# Patient Record
Sex: FEMALE | Race: BLACK OR AFRICAN AMERICAN | NOT HISPANIC OR LATINO | URBAN - METROPOLITAN AREA
[De-identification: names, ages, dates, MRNs, and addresses within clinical notes are randomized per-mention and may not be internally consistent; named-entity substitution may affect disease eponyms.]

---

## 2018-03-01 ENCOUNTER — EMERGENCY (EMERGENCY)
Facility: HOSPITAL | Age: 78
LOS: 1 days | Discharge: HOME | End: 2018-03-01
Attending: EMERGENCY MEDICINE | Admitting: EMERGENCY MEDICINE
Payer: MEDICARE

## 2018-03-01 VITALS
TEMPERATURE: 98 F | RESPIRATION RATE: 28 BRPM | DIASTOLIC BLOOD PRESSURE: 62 MMHG | HEART RATE: 86 BPM | SYSTOLIC BLOOD PRESSURE: 127 MMHG | OXYGEN SATURATION: 100 %

## 2018-03-01 VITALS — SYSTOLIC BLOOD PRESSURE: 149 MMHG | HEART RATE: 74 BPM | DIASTOLIC BLOOD PRESSURE: 68 MMHG | OXYGEN SATURATION: 100 %

## 2018-03-01 DIAGNOSIS — Z95.1 PRESENCE OF AORTOCORONARY BYPASS GRAFT: Chronic | ICD-10-CM

## 2018-03-01 LAB
BASOPHILS # BLD AUTO: 0.02 K/UL — SIGNIFICANT CHANGE UP (ref 0–0.2)
BASOPHILS NFR BLD AUTO: 0.3 % — SIGNIFICANT CHANGE UP (ref 0–2)
BUN SERPL-MCNC: 26 MG/DL — HIGH (ref 7–23)
CALCIUM SERPL-MCNC: 10.1 MG/DL — SIGNIFICANT CHANGE UP (ref 8.4–10.5)
CHLORIDE SERPL-SCNC: 102 MMOL/L — SIGNIFICANT CHANGE UP (ref 98–107)
CO2 SERPL-SCNC: 24 MMOL/L — SIGNIFICANT CHANGE UP (ref 22–31)
CREAT SERPL-MCNC: 1.58 MG/DL — HIGH (ref 0.5–1.3)
EOSINOPHIL # BLD AUTO: 0.03 K/UL — SIGNIFICANT CHANGE UP (ref 0–0.5)
EOSINOPHIL NFR BLD AUTO: 0.4 % — SIGNIFICANT CHANGE UP (ref 0–6)
GLUCOSE SERPL-MCNC: 48 MG/DL — LOW (ref 70–99)
HCT VFR BLD CALC: 41.5 % — SIGNIFICANT CHANGE UP (ref 34.5–45)
HGB BLD-MCNC: 12.7 G/DL — SIGNIFICANT CHANGE UP (ref 11.5–15.5)
IMM GRANULOCYTES # BLD AUTO: 0.03 # — SIGNIFICANT CHANGE UP
IMM GRANULOCYTES NFR BLD AUTO: 0.4 % — SIGNIFICANT CHANGE UP (ref 0–1.5)
LYMPHOCYTES # BLD AUTO: 2.4 K/UL — SIGNIFICANT CHANGE UP (ref 1–3.3)
LYMPHOCYTES # BLD AUTO: 31.8 % — SIGNIFICANT CHANGE UP (ref 13–44)
MCHC RBC-ENTMCNC: 24.6 PG — LOW (ref 27–34)
MCHC RBC-ENTMCNC: 30.6 % — LOW (ref 32–36)
MCV RBC AUTO: 80.3 FL — SIGNIFICANT CHANGE UP (ref 80–100)
MONOCYTES # BLD AUTO: 0.75 K/UL — SIGNIFICANT CHANGE UP (ref 0–0.9)
MONOCYTES NFR BLD AUTO: 9.9 % — SIGNIFICANT CHANGE UP (ref 2–14)
NEUTROPHILS # BLD AUTO: 4.32 K/UL — SIGNIFICANT CHANGE UP (ref 1.8–7.4)
NEUTROPHILS NFR BLD AUTO: 57.2 % — SIGNIFICANT CHANGE UP (ref 43–77)
NRBC # FLD: 0 — SIGNIFICANT CHANGE UP
PLATELET # BLD AUTO: 296 K/UL — SIGNIFICANT CHANGE UP (ref 150–400)
PMV BLD: 10.6 FL — SIGNIFICANT CHANGE UP (ref 7–13)
POTASSIUM SERPL-MCNC: 4.9 MMOL/L — SIGNIFICANT CHANGE UP (ref 3.5–5.3)
POTASSIUM SERPL-SCNC: 4.9 MMOL/L — SIGNIFICANT CHANGE UP (ref 3.5–5.3)
RBC # BLD: 5.17 M/UL — SIGNIFICANT CHANGE UP (ref 3.8–5.2)
RBC # FLD: 15.9 % — HIGH (ref 10.3–14.5)
SODIUM SERPL-SCNC: 143 MMOL/L — SIGNIFICANT CHANGE UP (ref 135–145)
WBC # BLD: 7.55 K/UL — SIGNIFICANT CHANGE UP (ref 3.8–10.5)
WBC # FLD AUTO: 7.55 K/UL — SIGNIFICANT CHANGE UP (ref 3.8–10.5)

## 2018-03-01 PROCEDURE — 99283 EMERGENCY DEPT VISIT LOW MDM: CPT | Mod: GC

## 2018-03-01 RX ORDER — SODIUM CHLORIDE 9 MG/ML
2000 INJECTION INTRAMUSCULAR; INTRAVENOUS; SUBCUTANEOUS ONCE
Qty: 0 | Refills: 0 | Status: COMPLETED | OUTPATIENT
Start: 2018-03-01 | End: 2018-03-01

## 2018-03-01 RX ORDER — ACETAMINOPHEN 500 MG
650 TABLET ORAL EVERY 6 HOURS
Qty: 0 | Refills: 0 | Status: DISCONTINUED | OUTPATIENT
Start: 2018-03-01 | End: 2018-03-05

## 2018-03-01 RX ORDER — TRAMADOL HYDROCHLORIDE 50 MG/1
1 TABLET ORAL
Qty: 12 | Refills: 0 | OUTPATIENT
Start: 2018-03-01 | End: 2018-03-03

## 2018-03-01 RX ADMIN — Medication 650 MILLIGRAM(S): at 13:51

## 2018-03-01 RX ADMIN — Medication 650 MILLIGRAM(S): at 14:21

## 2018-03-01 RX ADMIN — SODIUM CHLORIDE 2000 MILLILITER(S): 9 INJECTION INTRAMUSCULAR; INTRAVENOUS; SUBCUTANEOUS at 13:51

## 2018-03-01 NOTE — ED ADULT NURSE NOTE - CHIEF COMPLAINT QUOTE
was seen an d DC from [facility] for same C/O on Monday. pt reports chronic atraumatic right leg pain with pain shooting down L leg. walks with cane,

## 2018-03-01 NOTE — PROVIDER CONTACT NOTE (OTHER) - ASSESSMENT
As asked by patient, I arranged [employer] taxi. Pt will pay $30 cash.
samira. Pt will travel back home by taxi arranged by .

## 2018-03-01 NOTE — ED PROVIDER NOTE - CARE PLAN
Principal Discharge DX:	Diabetic polyneuropathy associated with type 1 diabetes mellitus  Secondary Diagnosis:	Type 1 diabetes mellitus with nephropathy

## 2018-03-01 NOTE — ED PROVIDER NOTE - MEDICAL DECISION MAKING DETAILS
77 year old female >3 month atraumatic LEFT leg pain that radiates from lower back. Start fluids to bring sugar down, pain control 77 year old female >3 month atraumatic LEFT leg pain that radiates from lower back. Start fluids to bring sugar down, sugar monitoring, pain control 77 year old female >3 month atraumatic LEFT leg pain that radiates from lower back. Start fluids to bring sugar down, sugar monitoring, pain control  Samantha: rt leg pain for months to years, worsening, DM not well controled. Muscle cramps. No trauma. moves leg well. Has pulses. tx dm and pain 77 year old female >3 month atraumatic LEFT leg pain that radiates from lower back. Start fluids to bring sugar down, sugar monitoring, pain control, d/c home.   Samantha: rt leg pain for months to years, worsening, DM not well controled. Muscle cramps. No trauma. moves leg well. Has pulses. tx dm and pain

## 2018-03-01 NOTE — PROVIDER CONTACT NOTE (OTHER) - BACKGROUND
needs help with cleaning,  medications. Pt cooks, has [employer] ride and uses a cane for ambulation. Need for Medic Alert discussed, verbalized understanding. Information provided for private

## 2018-03-01 NOTE — ED PROVIDER NOTE - ATTENDING CONTRIBUTION TO CARE
I performed a history and physical exam of the patient and discussed their management with the resident and /or advanced care provider. I reviewed the resident and /or ACP's note and agree with the documented findings and plan of care. My medical decison making and observations are found above.  Abd soft. rt leg with from. sensation in le decrease lower

## 2018-03-01 NOTE — PROVIDER CONTACT NOTE (OTHER) - SITUATION
Met with pt at bedside for discharge planing. Pt reports living alone, having more and more difficulties with house chores. Pt has 1199 and Medicare. No skills need identified. Pt states that, she

## 2018-03-01 NOTE — ED PROVIDER NOTE - OBJECTIVE STATEMENT
Pt is a 77 year old female w/ RIGHT leg numbness for over 3 months. Pt was recently seen at [facility] for the same c/c where she was given fluids and discharged. Pt states she was not given any medication or follow-up. Pt states that the pain and numbness has been persistent and has not changed for past 3 months. Pt denies any new change in activity. Pt denies any alleviating factors.

## 2018-03-01 NOTE — ED ADULT TRIAGE NOTE - CHIEF COMPLAINT QUOTE
was seen an d DC from [facility] for same C/O on Monday. pt reports chronic atraumatic right leg pain with pain shooting down L leg. walks with cane,

## 2018-04-13 ENCOUNTER — INPATIENT (INPATIENT)
Facility: HOSPITAL | Age: 78
LOS: 2 days | Discharge: HOME | DRG: 300 | End: 2018-04-16
Attending: INTERNAL MEDICINE | Admitting: INTERNAL MEDICINE
Payer: COMMERCIAL

## 2018-04-13 VITALS
TEMPERATURE: 98 F | HEART RATE: 82 BPM | DIASTOLIC BLOOD PRESSURE: 83 MMHG | SYSTOLIC BLOOD PRESSURE: 136 MMHG | WEIGHT: 210.1 LBS | RESPIRATION RATE: 16 BRPM | OXYGEN SATURATION: 98 %

## 2018-04-13 DIAGNOSIS — E11.9 TYPE 2 DIABETES MELLITUS WITHOUT COMPLICATIONS: ICD-10-CM

## 2018-04-13 DIAGNOSIS — Z95.5 PRESENCE OF CORONARY ANGIOPLASTY IMPLANT AND GRAFT: Chronic | ICD-10-CM

## 2018-04-13 DIAGNOSIS — I25.10 ATHEROSCLEROTIC HEART DISEASE OF NATIVE CORONARY ARTERY WITHOUT ANGINA PECTORIS: ICD-10-CM

## 2018-04-13 DIAGNOSIS — E11.52 TYPE 2 DIABETES MELLITUS WITH DIABETIC PERIPHERAL ANGIOPATHY WITH GANGRENE: ICD-10-CM

## 2018-04-13 DIAGNOSIS — Z98.890 OTHER SPECIFIED POSTPROCEDURAL STATES: Chronic | ICD-10-CM

## 2018-04-13 DIAGNOSIS — L03.119 CELLULITIS OF UNSPECIFIED PART OF LIMB: ICD-10-CM

## 2018-04-13 DIAGNOSIS — I10 ESSENTIAL (PRIMARY) HYPERTENSION: ICD-10-CM

## 2018-04-13 DIAGNOSIS — Z29.9 ENCOUNTER FOR PROPHYLACTIC MEASURES, UNSPECIFIED: ICD-10-CM

## 2018-04-13 DIAGNOSIS — Z95.1 PRESENCE OF AORTOCORONARY BYPASS GRAFT: Chronic | ICD-10-CM

## 2018-04-13 LAB
ACETONE SERPL-MCNC: NEGATIVE — SIGNIFICANT CHANGE UP
ALBUMIN SERPL ELPH-MCNC: 2.8 G/DL — LOW (ref 3.5–5)
ALP SERPL-CCNC: 91 U/L — SIGNIFICANT CHANGE UP (ref 40–120)
ALT FLD-CCNC: 41 U/L DA — SIGNIFICANT CHANGE UP (ref 10–60)
ANION GAP SERPL CALC-SCNC: 6 MMOL/L — SIGNIFICANT CHANGE UP (ref 5–17)
AST SERPL-CCNC: 38 U/L — SIGNIFICANT CHANGE UP (ref 10–40)
BASE EXCESS BLDV CALC-SCNC: -2.3 MMOL/L — LOW (ref -2–2)
BASOPHILS # BLD AUTO: 0.1 K/UL — SIGNIFICANT CHANGE UP (ref 0–0.2)
BASOPHILS NFR BLD AUTO: 1.4 % — SIGNIFICANT CHANGE UP (ref 0–2)
BILIRUB SERPL-MCNC: 0.4 MG/DL — SIGNIFICANT CHANGE UP (ref 0.2–1.2)
BLOOD GAS COMMENTS, VENOUS: SIGNIFICANT CHANGE UP
BUN SERPL-MCNC: 28 MG/DL — HIGH (ref 7–18)
CALCIUM SERPL-MCNC: 8.9 MG/DL — SIGNIFICANT CHANGE UP (ref 8.4–10.5)
CHLORIDE SERPL-SCNC: 107 MMOL/L — SIGNIFICANT CHANGE UP (ref 96–108)
CO2 SERPL-SCNC: 25 MMOL/L — SIGNIFICANT CHANGE UP (ref 22–31)
CREAT SERPL-MCNC: 1.27 MG/DL — SIGNIFICANT CHANGE UP (ref 0.5–1.3)
EOSINOPHIL # BLD AUTO: 0.2 K/UL — SIGNIFICANT CHANGE UP (ref 0–0.5)
EOSINOPHIL NFR BLD AUTO: 3.8 % — SIGNIFICANT CHANGE UP (ref 0–6)
GLUCOSE SERPL-MCNC: 88 MG/DL — SIGNIFICANT CHANGE UP (ref 70–99)
HCO3 BLDV-SCNC: 22 MMOL/L — SIGNIFICANT CHANGE UP (ref 21–29)
HCT VFR BLD CALC: 34.1 % — LOW (ref 34.5–45)
HGB BLD-MCNC: 10.4 G/DL — LOW (ref 11.5–15.5)
HOROWITZ INDEX BLDV+IHG-RTO: 21 — SIGNIFICANT CHANGE UP
LACTATE SERPL-SCNC: 0.7 MMOL/L — SIGNIFICANT CHANGE UP (ref 0.7–2)
LYMPHOCYTES # BLD AUTO: 2 K/UL — SIGNIFICANT CHANGE UP (ref 1–3.3)
LYMPHOCYTES # BLD AUTO: 34.1 % — SIGNIFICANT CHANGE UP (ref 13–44)
MCHC RBC-ENTMCNC: 24.7 PG — LOW (ref 27–34)
MCHC RBC-ENTMCNC: 30.4 GM/DL — LOW (ref 32–36)
MCV RBC AUTO: 81.1 FL — SIGNIFICANT CHANGE UP (ref 80–100)
MONOCYTES # BLD AUTO: 0.4 K/UL — SIGNIFICANT CHANGE UP (ref 0–0.9)
MONOCYTES NFR BLD AUTO: 6.7 % — SIGNIFICANT CHANGE UP (ref 2–14)
NEUTROPHILS # BLD AUTO: 3.3 K/UL — SIGNIFICANT CHANGE UP (ref 1.8–7.4)
NEUTROPHILS NFR BLD AUTO: 54.1 % — SIGNIFICANT CHANGE UP (ref 43–77)
PCO2 BLDV: 40 MMHG — SIGNIFICANT CHANGE UP (ref 35–50)
PH BLDV: 7.37 — SIGNIFICANT CHANGE UP (ref 7.35–7.45)
PLATELET # BLD AUTO: 251 K/UL — SIGNIFICANT CHANGE UP (ref 150–400)
PO2 BLDV: 46 MMHG — HIGH (ref 25–45)
POTASSIUM SERPL-MCNC: 4.8 MMOL/L — SIGNIFICANT CHANGE UP (ref 3.5–5.3)
POTASSIUM SERPL-SCNC: 4.8 MMOL/L — SIGNIFICANT CHANGE UP (ref 3.5–5.3)
PROT SERPL-MCNC: 7.5 G/DL — SIGNIFICANT CHANGE UP (ref 6–8.3)
RBC # BLD: 4.2 M/UL — SIGNIFICANT CHANGE UP (ref 3.8–5.2)
RBC # FLD: 15.5 % — HIGH (ref 10.3–14.5)
SAO2 % BLDV: 77 % — SIGNIFICANT CHANGE UP (ref 67–88)
SODIUM SERPL-SCNC: 138 MMOL/L — SIGNIFICANT CHANGE UP (ref 135–145)
WBC # BLD: 6 K/UL — SIGNIFICANT CHANGE UP (ref 3.8–10.5)
WBC # FLD AUTO: 6 K/UL — SIGNIFICANT CHANGE UP (ref 3.8–10.5)

## 2018-04-13 PROCEDURE — 73630 X-RAY EXAM OF FOOT: CPT | Mod: 26,RT

## 2018-04-13 PROCEDURE — 93971 EXTREMITY STUDY: CPT | Mod: 26,RT

## 2018-04-13 PROCEDURE — 93925 LOWER EXTREMITY STUDY: CPT | Mod: 26

## 2018-04-13 PROCEDURE — 99285 EMERGENCY DEPT VISIT HI MDM: CPT

## 2018-04-13 RX ORDER — ENOXAPARIN SODIUM 100 MG/ML
40 INJECTION SUBCUTANEOUS DAILY
Qty: 0 | Refills: 0 | Status: DISCONTINUED | OUTPATIENT
Start: 2018-04-13 | End: 2018-04-16

## 2018-04-13 RX ORDER — VANCOMYCIN HCL 1 G
1000 VIAL (EA) INTRAVENOUS EVERY 12 HOURS
Qty: 0 | Refills: 0 | Status: DISCONTINUED | OUTPATIENT
Start: 2018-04-13 | End: 2018-04-13

## 2018-04-13 RX ORDER — PIPERACILLIN AND TAZOBACTAM 4; .5 G/20ML; G/20ML
3.38 INJECTION, POWDER, LYOPHILIZED, FOR SOLUTION INTRAVENOUS EVERY 8 HOURS
Qty: 0 | Refills: 0 | Status: DISCONTINUED | OUTPATIENT
Start: 2018-04-13 | End: 2018-04-13

## 2018-04-13 RX ORDER — INSULIN GLARGINE 100 [IU]/ML
70 INJECTION, SOLUTION SUBCUTANEOUS EVERY MORNING
Qty: 0 | Refills: 0 | Status: DISCONTINUED | OUTPATIENT
Start: 2018-04-13 | End: 2018-04-16

## 2018-04-13 RX ORDER — INSULIN LISPRO 100/ML
VIAL (ML) SUBCUTANEOUS
Qty: 0 | Refills: 0 | Status: DISCONTINUED | OUTPATIENT
Start: 2018-04-13 | End: 2018-04-13

## 2018-04-13 RX ORDER — MORPHINE SULFATE 50 MG/1
4 CAPSULE, EXTENDED RELEASE ORAL ONCE
Qty: 0 | Refills: 0 | Status: DISCONTINUED | OUTPATIENT
Start: 2018-04-13 | End: 2018-04-13

## 2018-04-13 RX ORDER — ASPIRIN/CALCIUM CARB/MAGNESIUM 324 MG
81 TABLET ORAL DAILY
Qty: 0 | Refills: 0 | Status: DISCONTINUED | OUTPATIENT
Start: 2018-04-13 | End: 2018-04-16

## 2018-04-13 RX ORDER — PIPERACILLIN AND TAZOBACTAM 4; .5 G/20ML; G/20ML
3.38 INJECTION, POWDER, LYOPHILIZED, FOR SOLUTION INTRAVENOUS ONCE
Qty: 0 | Refills: 0 | Status: COMPLETED | OUTPATIENT
Start: 2018-04-13 | End: 2018-04-13

## 2018-04-13 RX ORDER — ATORVASTATIN CALCIUM 80 MG/1
40 TABLET, FILM COATED ORAL AT BEDTIME
Qty: 0 | Refills: 0 | Status: DISCONTINUED | OUTPATIENT
Start: 2018-04-13 | End: 2018-04-16

## 2018-04-13 RX ORDER — SODIUM CHLORIDE 9 MG/ML
1000 INJECTION INTRAMUSCULAR; INTRAVENOUS; SUBCUTANEOUS ONCE
Qty: 0 | Refills: 0 | Status: COMPLETED | OUTPATIENT
Start: 2018-04-13 | End: 2018-04-13

## 2018-04-13 RX ORDER — SODIUM CHLORIDE 9 MG/ML
3 INJECTION INTRAMUSCULAR; INTRAVENOUS; SUBCUTANEOUS ONCE
Qty: 0 | Refills: 0 | Status: COMPLETED | OUTPATIENT
Start: 2018-04-13 | End: 2018-04-13

## 2018-04-13 RX ORDER — INSULIN LISPRO 100/ML
VIAL (ML) SUBCUTANEOUS
Qty: 0 | Refills: 0 | Status: DISCONTINUED | OUTPATIENT
Start: 2018-04-13 | End: 2018-04-16

## 2018-04-13 RX ORDER — VANCOMYCIN HCL 1 G
1000 VIAL (EA) INTRAVENOUS ONCE
Qty: 0 | Refills: 0 | Status: COMPLETED | OUTPATIENT
Start: 2018-04-13 | End: 2018-04-13

## 2018-04-13 RX ORDER — AMPICILLIN SODIUM AND SULBACTAM SODIUM 250; 125 MG/ML; MG/ML
3 INJECTION, POWDER, FOR SUSPENSION INTRAMUSCULAR; INTRAVENOUS EVERY 6 HOURS
Qty: 0 | Refills: 0 | Status: DISCONTINUED | OUTPATIENT
Start: 2018-04-13 | End: 2018-04-16

## 2018-04-13 RX ADMIN — SODIUM CHLORIDE 3 MILLILITER(S): 9 INJECTION INTRAMUSCULAR; INTRAVENOUS; SUBCUTANEOUS at 17:53

## 2018-04-13 RX ADMIN — SODIUM CHLORIDE 1000 MILLILITER(S): 9 INJECTION INTRAMUSCULAR; INTRAVENOUS; SUBCUTANEOUS at 18:39

## 2018-04-13 RX ADMIN — PIPERACILLIN AND TAZOBACTAM 200 GRAM(S): 4; .5 INJECTION, POWDER, LYOPHILIZED, FOR SOLUTION INTRAVENOUS at 20:03

## 2018-04-13 RX ADMIN — Medication 250 MILLIGRAM(S): at 20:04

## 2018-04-13 RX ADMIN — MORPHINE SULFATE 4 MILLIGRAM(S): 50 CAPSULE, EXTENDED RELEASE ORAL at 19:04

## 2018-04-13 RX ADMIN — MORPHINE SULFATE 4 MILLIGRAM(S): 50 CAPSULE, EXTENDED RELEASE ORAL at 18:41

## 2018-04-13 NOTE — ED PROVIDER NOTE - OBJECTIVE STATEMENT
77 y/o F pt with PMHx of DM, HTN, MI, and CAD (s/p stents) and PSHx of Coronary Artery Stent Placement and Hysterectomy referred by PMD to ED for admission for cellulitis and vascular complication of the R foot today. Pt reports experiencing progressively worsening R foot pain x4 weeks. Pt additionally reports subjective fevers, as well as sensation of "hardness" at the bottom of the R foot. Pt states she was seen at [facility] for current sx's twice, with no tests being performed at the time. Pt denies CP, abdominal pain, nausea, vomiting, diarrhea, or any other complaints. Pt also denies recent trauma (pt's R foot is atraumatic). Pt notes FHx of DM. NKDA.

## 2018-04-13 NOTE — H&P ADULT - PROBLEM SELECTOR PLAN 3
-Pt uses Levemir 70U in AM and moderate sliding scale with humalog at home  -Will continue home med, primary team please call pharmacy to complete med rec  -Check Hba1c and monitor accucheck  -Diabetic diet

## 2018-04-13 NOTE — H&P ADULT - PROBLEM SELECTOR PLAN 5
-Pt unsure what medications she takes at home, starting aspirin 81mg and lipitor 40mg.  -Primary team to complete med rec

## 2018-04-13 NOTE — ED PROVIDER NOTE - MEDICAL DECISION MAKING DETAILS
79 yo f referred from her PMD for infection and vascular compromise of right foot and exam concerning for cellulitis versus osteomyelitis versus gangrene. Vascular surgery consulted and pt treated w/ vancomycin and zosyn and admitted to medicine

## 2018-04-13 NOTE — H&P ADULT - PROBLEM SELECTOR PLAN 6
-IMPROVE VTE Individual Risk Assessment          RISK                                                          Points  [  ] Previous VTE                                                3  [  ] Thrombophilia                                             2  [  ] Lower limb paralysis                                   2        (unable to hold up >15 seconds)    [  ] Current Cancer                                            2         (within 6 months)  [ x ] Immobilization > 24 hrs                             1  [  ] ICU/CCU stay > 24 hours                           1  [ x ] Age > 60                                                       1  IMPROVE VTE Score _____2____  -Starting Lovenox subcu daily for DVT PPx

## 2018-04-13 NOTE — H&P ADULT - PROBLEM SELECTOR PLAN 2
-Pt does not have severe pain, no leukocytosis, no fever. Local swelling around escharous area and other toes were noted on physical exam  -Starting unasyn, will need to consult ID for need for antibiotics.   -f/u blood culture -Pt does not have severe pain, no leukocytosis, no fever. Local swelling around escharous area and other toes were noted on physical exam  -Starting unasyn, ID Dr. Quinteros was consulted.  -f/u blood culture

## 2018-04-13 NOTE — H&P ADULT - PROBLEM SELECTOR PLAN 1
-Likely from diminished distal flow, consulted vascular on call; house officer Dr. Gold/attending Dr. Sahu  -Podiatry on call resident was also called  -Starting Unasyn  -f/u blood culture result -Likely from diminished distal flow, consulted vascular on call; house officer Dr. Gold/attending Dr. Sahu  -Podiatry on call resident was also called  -Check ESR, CRP. May need MRI of the foot for further eval.  -Starting Unasyn  -f/u blood culture result

## 2018-04-13 NOTE — ED PROVIDER NOTE - PHYSICAL EXAMINATION
General: pt lying in stretcher, appears stated age and is not in distress  HEENT: AT/NC, pink conjunctiva, anicteric sclerae, EOMI, PERRLA, TMs smooth, grey, intact, with normal landmarks, nasal mucosa pink, no discharge, turbinates not enlarged; moist mucus membranes, tongue well-papillated, good dentition; posterior pharynx shows no erythema or exudates;   Neck: supple, full ROM, trachea midline, no JVD, no cervical LAD, no midline ttp or stepoffs  Lungs: symmetric excursion, b/l clear vesicular breath sounds with no wheezes, crackles, or rhonchi  Heart: rrr, S1, S2 normal; no S3 or S4; no murmurs or rubs  Abd: normal bowel sounds; soft, nontender; negative McBurney's point tenderness, negative Acosta's sign, no rebound, no guarding, spleen non-palpable; no hepatomegaly, no masses  Back: no midline spinal tenderness or stepoffs, no costovertebral angle tenderness  Extremities: no clubbing, cyanosis, or edema; no palpable deformities or fractures  Skin: good turgor; no rashes, petechiae, ecchymoses, or jaundice  Pulses: radial, posterior tibialis (PT), dorsalis pedis (DP) all 2+ & symmetric  Neuro: awake, alert, responsive; oriented to person, place and time; cranial nerves intact, EOMI, intact jaw movement, intact facial sensation, no facial asymmetry, hearing intact; no nystagmus, tongue midline; Motor: Normal tone in upper and lower extremities bilaterally strength 5/5; Sensory: intact to pinprick and light touch; Cerebellar: finger-to-nose intact; normal steady gait; negative Romberg’s sign; DTR: biceps, triceps, patellar, 2+, no pronator drift General: pt lying in stretcher, appears stated age and is not in distress  HEENT: AT/NC, pink conjunctiva, anicteric sclerae, EOMI, PERRLA, nasal mucosa pink, no discharge, turbinates not enlarged; moist mucus membranes, tongue well-papillated, good dentition; posterior pharynx shows no erythema or exudates;   Neck: supple, full ROM, trachea midline, no JVD, no cervical LAD, no midline ttp or stepoffs  Lungs: symmetric excursion, b/l clear vesicular breath sounds with no wheezes, crackles, or rhonchi  Heart: rrr, S1, S2 normal; no S3 or S4; no murmurs or rubs  Abd: normal bowel sounds; soft, nontender; negative McBurney's point tenderness, negative Acosta's sign, no rebound, no guarding, spleen non-palpable; no hepatomegaly, no masses  Back: no midline spinal tenderness or stepoffs, no costovertebral angle tenderness  Extremities: RLE - right foot w/ discolored, black 1st and 2nd digit, w/ exquisite tenderness to the digits and plantar surface of the foot  Pulses: diminished on right foot, 2+ on left DP  Neuro: awake, alert, responsive; oriented to person, place and time; cranial nerves intact, EOMI, intact jaw movement, intact facial sensation, no facial asymmetry, hearing intact; no nystagmus, tongue midline; Motor: Normal tone in upper and lower extremities bilaterally strength 5/5; Sensory: intact

## 2018-04-13 NOTE — H&P ADULT - NSHPLABSRESULTS_GEN_ALL_CORE
LABS:                        10.4   6.0   )-----------( 251      ( 13 Apr 2018 17:49 )             34.1     04-13    138  |  107  |  28<H>  ----------------------------<  88  4.8   |  25  |  1.27    Ca    8.9      13 Apr 2018 17:49    TPro  7.5  /  Alb  2.8<L>  /  TBili  0.4  /  DBili  x   /  AST  38  /  ALT  41  /  AlkPhos  91  04-13        CAPILLARY BLOOD GLUCOSE        LIVER FUNCTIONS - ( 13 Apr 2018 17:49 )  Alb: 2.8 g/dL / Pro: 7.5 g/dL / ALK PHOS: 91 U/L / ALT: 41 U/L DA / AST: 38 U/L / GGT: x           < from: Xray Foot AP + Lateral + Oblique, Right (04.13.18 @ 17:10) >    FINDINGS: There is no evidence for acute fracture, dislocation, joint   space narrowing or retained radiodense foreign body. Soft tissue swelling   of the distal right lower leg and right foot noted. Vascular   calcifications identified. Note is made of a plantar calcaneal spur. No   distinct regions of osteolysis or osteosclerosis identified.    IMPRESSION: No evidence for acute fracture or dislocation. Soft tissue   swelling. No distinct regions of osteolysis or osteosclerosis identified.   If osteomyelitis remains of clinical concern MRI evaluation can be   performed.    < end of copied text >    < from: US Duplex Arterial Lower Ext Compl, Bilateral (04.13.18 @ 17:04) >    Right lower extremity: The ankle brachial index is 1.51. The pulse   waveforms are moderately diminished at the right digits.    Left lower extremity: The ankle brachial index is 1.5. The pulse   waveforms are normal throughout the extremity.    Impression: Normal bilateral RAMIRO. Moderately diminished pulse waveforms   at the level of the right digits.    < end of copied text >

## 2018-04-13 NOTE — H&P ADULT - HISTORY OF PRESENT ILLNESS
78 years old female lives alone at home, walks independently, with PMH of IDDM, HTN, MI, and CAD (s/p stents) and PSH of Coronary Artery Stent Placement and Hysterectomy was referred by podiatrist/ PMD cellulitis and suspected vascular complication of the right foot today. Patient is a poor historian so history was also obtained from ED provider. Pt reports having foot issue for many years, has been regularly followed local podiatrist. However since last month she has had numbness/decreased sensation of the right foot along with mild foot pain, and it has been worsening over last week. She says she also feels intermittent chills, but denies fever, SOB, chest pain, abdominal pain, weakness, N/V, or any other symptoms. Pt he was also seen at [facility] and [facility] multiple times.    In ED, pt was hemodynamically stable, with grossly normal labs except H/H of 10.4/34.1. X-ray of the Rt foot: No evidence for acute fracture or dislocation. Soft tissue swelling. No distinct regions of osteolysis or osteosclerosis identified. If osteomyelitis remains of clinical concern MRI evaluation can be performed.  Arterial and venous doppler were also performed; Normal bilateral RAMIRO. Moderately diminished pulse waveforms at the level of the right digits.    Pt is admitted to the medicine floor for right great toe dry gangrene, concern for cellulitis.    ** Primary team please call [facility] pharmacy and complete med rec. Pt does not remember the names of medications.

## 2018-04-13 NOTE — H&P ADULT - ASSESSMENT
Patient is a 78y old  Female who presents with a chief complaint of right toe gangrene from unknown onset. Pt is admitted to the medicine floor for right great toe dry gangrene, concern for cellulitis.

## 2018-04-13 NOTE — H&P ADULT - NSHPPHYSICALEXAM_GEN_ALL_CORE
PHYSICAL EXAM:  GENERAL: NAD, well-groomed, well-developed  HEAD:  Atraumatic, Normocephalic  EYES: EOMI, PERRLA, conjunctiva and sclera clear  ENMT: No tonsillar erythema, exudates, or enlargement; Moist mucous membranes.  NECK: Supple, No JVD, Normal thyroid  NERVOUS SYSTEM:  Alert & Oriented X3, poor concentration; Motor Strength 5/5 B/L upper and lower extremities; DTRs 2+ intact and symmetric. Decreased sense on the right lower digits  CHEST/LUNG: Clear to percussion bilaterally; No rales, rhonchi, wheezing, or rubs  HEART: Regular rate and rhythm; No murmurs, rubs, or gallops  ABDOMEN: Soft, Nontender, Nondistended; Bowel sounds present  EXTREMITIES:  2+ Peripheral Pulses bilaterally, No clubbing, cyanosis, or edema. Right great toe dry escharous lesion, no ulcer or discharge. Dorsalis pedis palpable.  LYMPH: No lymphadenopathy noted  SKIN: No rashes or lesions    T(C): 36.8 (04-13-18 @ 22:08), Max: 37.1 (04-13-18 @ 16:10)  HR: 75 (04-13-18 @ 22:08) (71 - 82)  BP: 148/67 (04-13-18 @ 22:08) (93/55 - 148/67)  RR: 18 (04-13-18 @ 22:08) (16 - 18)  SpO2: 97% (04-13-18 @ 22:08) (97% - 100%)  Wt(kg): --  I&O's Summary

## 2018-04-14 DIAGNOSIS — I77.1 STRICTURE OF ARTERY: ICD-10-CM

## 2018-04-14 DIAGNOSIS — I73.9 PERIPHERAL VASCULAR DISEASE, UNSPECIFIED: ICD-10-CM

## 2018-04-14 LAB
ANION GAP SERPL CALC-SCNC: 6 MMOL/L — SIGNIFICANT CHANGE UP (ref 5–17)
APTT BLD: 42.7 SEC — HIGH (ref 27.5–37.4)
BASOPHILS # BLD AUTO: 0 K/UL — SIGNIFICANT CHANGE UP (ref 0–0.2)
BASOPHILS NFR BLD AUTO: 0.9 % — SIGNIFICANT CHANGE UP (ref 0–2)
BUN SERPL-MCNC: 21 MG/DL — HIGH (ref 7–18)
CALCIUM SERPL-MCNC: 9.4 MG/DL — SIGNIFICANT CHANGE UP (ref 8.4–10.5)
CHLORIDE SERPL-SCNC: 106 MMOL/L — SIGNIFICANT CHANGE UP (ref 96–108)
CHOLEST SERPL-MCNC: 176 MG/DL — SIGNIFICANT CHANGE UP (ref 10–199)
CO2 SERPL-SCNC: 28 MMOL/L — SIGNIFICANT CHANGE UP (ref 22–31)
CREAT SERPL-MCNC: 0.94 MG/DL — SIGNIFICANT CHANGE UP (ref 0.5–1.3)
CRP SERPL-MCNC: 1 MG/DL — HIGH (ref 0–0.4)
EOSINOPHIL # BLD AUTO: 0.2 K/UL — SIGNIFICANT CHANGE UP (ref 0–0.5)
EOSINOPHIL NFR BLD AUTO: 4.2 % — SIGNIFICANT CHANGE UP (ref 0–6)
ERYTHROCYTE [SEDIMENTATION RATE] IN BLOOD: 92 MM/HR — HIGH (ref 0–20)
FOLATE SERPL-MCNC: 13.8 NG/ML — SIGNIFICANT CHANGE UP (ref 4.8–24.2)
GLUCOSE BLDC GLUCOMTR-MCNC: 171 MG/DL — HIGH (ref 70–99)
GLUCOSE BLDC GLUCOMTR-MCNC: 262 MG/DL — HIGH (ref 70–99)
GLUCOSE BLDC GLUCOMTR-MCNC: 91 MG/DL — SIGNIFICANT CHANGE UP (ref 70–99)
GLUCOSE BLDC GLUCOMTR-MCNC: 94 MG/DL — SIGNIFICANT CHANGE UP (ref 70–99)
GLUCOSE SERPL-MCNC: 91 MG/DL — SIGNIFICANT CHANGE UP (ref 70–99)
HBA1C BLD-MCNC: 7.6 % — HIGH (ref 4–5.6)
HCT VFR BLD CALC: 34.2 % — LOW (ref 34.5–45)
HDLC SERPL-MCNC: 45 MG/DL — SIGNIFICANT CHANGE UP (ref 40–125)
HGB BLD-MCNC: 10.7 G/DL — LOW (ref 11.5–15.5)
INR BLD: 1.21 RATIO — HIGH (ref 0.88–1.16)
LIPID PNL WITH DIRECT LDL SERPL: 111 MG/DL — SIGNIFICANT CHANGE UP
LYMPHOCYTES # BLD AUTO: 1.6 K/UL — SIGNIFICANT CHANGE UP (ref 1–3.3)
LYMPHOCYTES # BLD AUTO: 32.6 % — SIGNIFICANT CHANGE UP (ref 13–44)
MAGNESIUM SERPL-MCNC: 2.1 MG/DL — SIGNIFICANT CHANGE UP (ref 1.6–2.6)
MCHC RBC-ENTMCNC: 25.5 PG — LOW (ref 27–34)
MCHC RBC-ENTMCNC: 31.4 GM/DL — LOW (ref 32–36)
MCV RBC AUTO: 81.2 FL — SIGNIFICANT CHANGE UP (ref 80–100)
MONOCYTES # BLD AUTO: 0.4 K/UL — SIGNIFICANT CHANGE UP (ref 0–0.9)
MONOCYTES NFR BLD AUTO: 8.9 % — SIGNIFICANT CHANGE UP (ref 2–14)
NEUTROPHILS # BLD AUTO: 2.6 K/UL — SIGNIFICANT CHANGE UP (ref 1.8–7.4)
NEUTROPHILS NFR BLD AUTO: 53.4 % — SIGNIFICANT CHANGE UP (ref 43–77)
PHOSPHATE SERPL-MCNC: 3.1 MG/DL — SIGNIFICANT CHANGE UP (ref 2.5–4.5)
PLATELET # BLD AUTO: 236 K/UL — SIGNIFICANT CHANGE UP (ref 150–400)
POTASSIUM SERPL-MCNC: 4.9 MMOL/L — SIGNIFICANT CHANGE UP (ref 3.5–5.3)
POTASSIUM SERPL-SCNC: 4.9 MMOL/L — SIGNIFICANT CHANGE UP (ref 3.5–5.3)
PROTHROM AB SERPL-ACNC: 13.2 SEC — HIGH (ref 9.8–12.7)
RBC # BLD: 4.21 M/UL — SIGNIFICANT CHANGE UP (ref 3.8–5.2)
RBC # FLD: 15.3 % — HIGH (ref 10.3–14.5)
SODIUM SERPL-SCNC: 140 MMOL/L — SIGNIFICANT CHANGE UP (ref 135–145)
TOTAL CHOLESTEROL/HDL RATIO MEASUREMENT: 3.9 RATIO — SIGNIFICANT CHANGE UP (ref 3.3–7.1)
TRIGL SERPL-MCNC: 101 MG/DL — SIGNIFICANT CHANGE UP (ref 10–149)
TSH SERPL-MCNC: 1.22 UU/ML — SIGNIFICANT CHANGE UP (ref 0.34–4.82)
VIT B12 SERPL-MCNC: 780 PG/ML — SIGNIFICANT CHANGE UP (ref 232–1245)
WBC # BLD: 4.8 K/UL — SIGNIFICANT CHANGE UP (ref 3.8–10.5)
WBC # FLD AUTO: 4.8 K/UL — SIGNIFICANT CHANGE UP (ref 3.8–10.5)

## 2018-04-14 PROCEDURE — 99223 1ST HOSP IP/OBS HIGH 75: CPT | Mod: GC

## 2018-04-14 RX ADMIN — ENOXAPARIN SODIUM 40 MILLIGRAM(S): 100 INJECTION SUBCUTANEOUS at 11:41

## 2018-04-14 RX ADMIN — ATORVASTATIN CALCIUM 40 MILLIGRAM(S): 80 TABLET, FILM COATED ORAL at 21:33

## 2018-04-14 RX ADMIN — Medication 6: at 11:40

## 2018-04-14 RX ADMIN — AMPICILLIN SODIUM AND SULBACTAM SODIUM 200 GRAM(S): 250; 125 INJECTION, POWDER, FOR SUSPENSION INTRAMUSCULAR; INTRAVENOUS at 17:04

## 2018-04-14 RX ADMIN — Medication 2: at 21:50

## 2018-04-14 RX ADMIN — AMPICILLIN SODIUM AND SULBACTAM SODIUM 200 GRAM(S): 250; 125 INJECTION, POWDER, FOR SUSPENSION INTRAMUSCULAR; INTRAVENOUS at 01:10

## 2018-04-14 RX ADMIN — INSULIN GLARGINE 70 UNIT(S): 100 INJECTION, SOLUTION SUBCUTANEOUS at 08:27

## 2018-04-14 RX ADMIN — Medication 81 MILLIGRAM(S): at 11:41

## 2018-04-14 RX ADMIN — AMPICILLIN SODIUM AND SULBACTAM SODIUM 200 GRAM(S): 250; 125 INJECTION, POWDER, FOR SUSPENSION INTRAMUSCULAR; INTRAVENOUS at 06:49

## 2018-04-14 RX ADMIN — AMPICILLIN SODIUM AND SULBACTAM SODIUM 200 GRAM(S): 250; 125 INJECTION, POWDER, FOR SUSPENSION INTRAMUSCULAR; INTRAVENOUS at 11:41

## 2018-04-14 NOTE — CONSULT NOTE ADULT - SUBJECTIVE AND OBJECTIVE BOX
HPI:  78 years old female lives alone at home, walks independently, with PMH of IDDM, HTN, MI, and CAD (s/p stents) and PSH of Coronary Artery Stent Placement and Hysterectomy was referred by podiatrist/ PMD cellulitis and suspected vascular complication of the right foot today. Patient is a poor historian so history was also obtained from ED provider. Pt reports having foot issue for many years, has been regularly followed local podiatrist. However since last month she has had numbness/decreased sensation of the right foot along with mild foot pain, and it has been worsening over last week. She says she also feels intermittent chills, but denies fever, SOB, chest pain, abdominal pain, weakness, N/V, or any other symptoms. Pt he was also seen at [facility] and [facility] multiple times.Pt is admitted to the medicine floor for right great toe dry gangrene, concern for cellulitis.      PAST MEDICAL & SURGICAL HISTORY:  MI (myocardial infarction)  CAD (coronary artery disease)  Hypertension  Diabetes  Hyperlipemia  Diabetes type 2, uncontrolled  CAD (coronary artery disease)  H/O: hysterectomy  S/P coronary artery stent placement  S/P CABG x 3      No Known Allergies      Meds:  ampicillin/sulbactam  IVPB 3 Gram(s) IV Intermittent every 6 hours  aspirin  chewable 81 milliGRAM(s) Oral daily  atorvastatin 40 milliGRAM(s) Oral at bedtime  enoxaparin Injectable 40 milliGRAM(s) SubCutaneous daily  insulin glargine Injectable (LANTUS) 70 Unit(s) SubCutaneous every morning  insulin lispro (HumaLOG) corrective regimen sliding scale   SubCutaneous Before meals and at bedtime      SOCIAL HISTORY:  Smoker:   ETOH use:      FAMILY HISTORY:  No pertinent family history in first degree relatives  No pertinent family history in first degree relatives      VITALS:  Vital Signs Last 24 Hrs  T(C): 36.3 (14 Apr 2018 04:56), Max: 37.1 (13 Apr 2018 16:10)  T(F): 97.4 (14 Apr 2018 04:56), Max: 98.8 (13 Apr 2018 16:10)  HR: 89 (14 Apr 2018 04:56) (71 - 89)  BP: 147/63 (14 Apr 2018 04:56) (93/55 - 148/67)  BP(mean): --  RR: 16 (14 Apr 2018 04:56) (16 - 18)  SpO2: 100% (14 Apr 2018 04:56) (97% - 100%)    LABS/DIAGNOSTIC TESTS:                          10.7   4.8   )-----------( 236      ( 14 Apr 2018 07:54 )             34.2     WBC Count: 4.8 K/uL (04-14 @ 07:54)  WBC Count: 6.0 K/uL (04-13 @ 17:49)      04-14    140  |  106  |  21<H>  ----------------------------<  91  4.9   |  28  |  0.94    Ca    9.4      14 Apr 2018 07:54  Phos  3.1     04-14  Mg     2.1     04-14    TPro  7.5  /  Alb  2.8<L>  /  TBili  0.4  /  DBili  x   /  AST  38  /  ALT  41  /  AlkPhos  91  04-13          LIVER FUNCTIONS - ( 13 Apr 2018 17:49 )  Alb: 2.8 g/dL / Pro: 7.5 g/dL / ALK PHOS: 91 U/L / ALT: 41 U/L DA / AST: 38 U/L / GGT: x             PT/INR - ( 14 Apr 2018 07:54 )   PT: 13.2 sec;   INR: 1.21 ratio         PTT - ( 14 Apr 2018 07:54 )  PTT:42.7 sec    LACTATE:Lactate, Blood: 0.7 mmol/L (04-13 @ 17:49)      ABG -     CULTURES:       RADIOLOGY:< from: Xray Foot AP + Lateral + Oblique, Right (04.13.18 @ 17:10) >  EXAM:  FOOT RIGHT (MINIMUM 3 VIEWS)                            PROCEDURE DATE:  04/13/2018          INTERPRETATION:  AP, lateral, and oblique views of the right foot    CLINICAL INDICATION: Assess for osteomyelitis.    COMPARISON: None     FINDINGS: There is no evidence for acute fracture, dislocation, joint   space narrowing or retained radiodense foreign body. Soft tissue swelling   of the distal right lower leg and right foot noted. Vascular   calcifications identified. Note is made of a plantar calcaneal spur. No   distinct regions of osteolysis or osteosclerosis identified.    IMPRESSION: No evidence for acute fracture or dislocation. Soft tissue   swelling. No distinct regions of osteolysis or osteosclerosis identified.   If osteomyelitis remains of clinical concern MRI evaluation can be   performed.      < end of copied text >        ROS  [  ] UNABLE TO ELICIT HPI:  78 years old female lives alone at home, walks independently, with PMH of IDDM, HTN, MI, and CAD (s/p stents) and PSH of Coronary Artery Stent Placement and Hysterectomy was referred by podiatrist/ PMD cellulitis and suspected vascular complication of the right foot today.  Pt reports having foot issue for many years, has been regularly followed local podiatrist. However since last month she has had numbness/decreased sensation of the right foot along with mild foot pain, and it has been worsening over last week. She says she also feels intermittent chills, but denies fever, SOB, chest pain, abdominal pain, weakness, N/V, or any other symptoms. Pt he was also seen at [facility] and [facility] multiple times.Pt is admitted to the medicine floor for right great toe dry gangrene, concern for cellulitis. she has no pain in foot , her right foot swelling is much better today as is the redness of her foot compared to yesterday as per pt.      PAST MEDICAL & SURGICAL HISTORY:  MI (myocardial infarction)  CAD (coronary artery disease)  Hypertension  Diabetes  Hyperlipemia  Diabetes type 2, uncontrolled  CAD (coronary artery disease)  H/O: hysterectomy  S/P coronary artery stent placement  S/P CABG x 3      No Known Allergies      Meds:  ampicillin/sulbactam  IVPB 3 Gram(s) IV Intermittent every 6 hours  aspirin  chewable 81 milliGRAM(s) Oral daily  atorvastatin 40 milliGRAM(s) Oral at bedtime  enoxaparin Injectable 40 milliGRAM(s) SubCutaneous daily  insulin glargine Injectable (LANTUS) 70 Unit(s) SubCutaneous every morning  insulin lispro (HumaLOG) corrective regimen sliding scale   SubCutaneous Before meals and at bedtime      SOCIAL HISTORY:  Smoker: never   ETOH use: no     FAMILY HISTORY:  No pertinent family history in first degree relatives  No pertinent family history in first degree relatives      VITALS:  Vital Signs Last 24 Hrs  T(C): 36.3 (14 Apr 2018 04:56), Max: 37.1 (13 Apr 2018 16:10)  T(F): 97.4 (14 Apr 2018 04:56), Max: 98.8 (13 Apr 2018 16:10)  HR: 89 (14 Apr 2018 04:56) (71 - 89)  BP: 147/63 (14 Apr 2018 04:56) (93/55 - 148/67)  BP(mean): --  RR: 16 (14 Apr 2018 04:56) (16 - 18)  SpO2: 100% (14 Apr 2018 04:56) (97% - 100%)    LABS/DIAGNOSTIC TESTS:                          10.7   4.8   )-----------( 236      ( 14 Apr 2018 07:54 )             34.2     WBC Count: 4.8 K/uL (04-14 @ 07:54)  WBC Count: 6.0 K/uL (04-13 @ 17:49)      04-14    140  |  106  |  21<H>  ----------------------------<  91  4.9   |  28  |  0.94    Ca    9.4      14 Apr 2018 07:54  Phos  3.1     04-14  Mg     2.1     04-14    TPro  7.5  /  Alb  2.8<L>  /  TBili  0.4  /  DBili  x   /  AST  38  /  ALT  41  /  AlkPhos  91  04-13          LIVER FUNCTIONS - ( 13 Apr 2018 17:49 )  Alb: 2.8 g/dL / Pro: 7.5 g/dL / ALK PHOS: 91 U/L / ALT: 41 U/L DA / AST: 38 U/L / GGT: x             PT/INR - ( 14 Apr 2018 07:54 )   PT: 13.2 sec;   INR: 1.21 ratio         PTT - ( 14 Apr 2018 07:54 )  PTT:42.7 sec    LACTATE:Lactate, Blood: 0.7 mmol/L (04-13 @ 17:49)      ABG -     CULTURES:       RADIOLOGY:< from: Xray Foot AP + Lateral + Oblique, Right (04.13.18 @ 17:10) >  EXAM:  FOOT RIGHT (MINIMUM 3 VIEWS)                            PROCEDURE DATE:  04/13/2018          INTERPRETATION:  AP, lateral, and oblique views of the right foot    CLINICAL INDICATION: Assess for osteomyelitis.    COMPARISON: None     FINDINGS: There is no evidence for acute fracture, dislocation, joint   space narrowing or retained radiodense foreign body. Soft tissue swelling   of the distal right lower leg and right foot noted. Vascular   calcifications identified. Note is made of a plantar calcaneal spur. No   distinct regions of osteolysis or osteosclerosis identified.    IMPRESSION: No evidence for acute fracture or dislocation. Soft tissue   swelling. No distinct regions of osteolysis or osteosclerosis identified.   If osteomyelitis remains of clinical concern MRI evaluation can be   performed.      < end of copied text >        ROS  [  ] UNABLE TO ELICIT

## 2018-04-14 NOTE — CONSULT NOTE ADULT - SUBJECTIVE AND OBJECTIVE BOX
HPI: 78 years old female lives alone at home, walks independently, with PMH of IDDM, HTN, MI, and CAD (s/p stents) and PSH of Coronary Artery Stent Placement and Hysterectomy was referred by podiatrist/ PMD cellulitis and suspected vascular complication of the right foot today. Patient is a poor historian so history was also obtained from ED provider. Pt reports having foot issue for many years, has been regularly followed local podiatrist. However since last month she has had numbness/decreased sensation of the right foot along with mild foot pain, and it has been worsening over last week. She says she also feels intermittent chills, but denies fever, SOB, chest pain, abdominal pain, weakness, N/V, or any other symptoms. Pt he was also seen at [facility] and [facility] multiple times.      Patient admits to  (-) Fevers, (-) Chills, (-) Nausea, (-) Vomiting, (-) Shortness of Breath      PMH: MI (myocardial infarction)  CAD (coronary artery disease)  Hypertension  Diabetes  Hyperlipemia  Diabetes type 2, uncontrolled  CAD (coronary artery disease)    PSH:H/O: hysterectomy  S/P coronary artery stent placement  S/P CABG x 3    Allergies:No Known Allergies      Labs:                          10.7   4.8   )-----------( 236      ( 14 Apr 2018 07:54 )             34.2     WBC Trend  4.8 Date (04-14 @ 07:54)  6.0 Date (04-13 @ 17:49)      Chem  04-14    140  |  106  |  21<H>  ----------------------------<  91  4.9   |  28  |  0.94    Ca    9.4      14 Apr 2018 07:54  Phos  3.1     04-14  Mg     2.1     04-14    TPro  7.5  /  Alb  2.8<L>  /  TBili  0.4  /  DBili  x   /  AST  38  /  ALT  41  /  AlkPhos  91  04-13      T(F): 97.9 (04-14-18 @ 13:34), Max: 98.2 (04-13-18 @ 22:08)  HR: 72 (04-14-18 @ 13:34) (72 - 89)  BP: 122/65 (04-14-18 @ 13:34) (122/65 - 148/67)  RR: 15 (04-14-18 @ 13:34) (15 - 18)  SpO2: 100% (04-14-18 @ 13:34) (97% - 100%)    < from: US Duplex Arterial Lower Ext Compl, Bilateral (04.13.18 @ 17:04) >    EXAM:  US DPLX LWR EXT ARTS COMPL BI                            PROCEDURE DATE:  04/13/2018          INTERPRETATION:  Clinical Information: Right toe swelling.    Technique: Bilateral lower extremity ABIs/PVR    Comparison: None.    Findings:  Right lower extremity: The ankle brachial index is 1.51. The pulse   waveforms are moderately diminished at the right digits.    Left lower extremity: The ankle brachial index is 1.5. The pulse   waveforms are normal throughout the extremity.    Impression: Normal bilateral RAMIRO. Moderately diminished pulse waveforms   at the level of the right digits.    < end of copied text >    < from: Xray Foot AP + Lateral + Oblique, Right (04.13.18 @ 17:10) >  PROCEDURE DATE:  04/13/2018          INTERPRETATION:  AP, lateral, and oblique views of the right foot    CLINICAL INDICATION: Assess for osteomyelitis.    COMPARISON: None     FINDINGS: There is no evidence for acute fracture, dislocation, joint   space narrowing or retained radiodense foreign body. Soft tissue swelling   of the distal right lower leg and right foot noted. Vascular   calcifications identified. Note is made of a plantar calcaneal spur. No   distinct regions of osteolysis or osteosclerosis identified.    IMPRESSION: No evidence for acute fracture or dislocation. Soft tissue   swelling. No distinct regions of osteolysis or osteosclerosis identified.   If osteomyelitis remains of clinical concern MRI evaluation can be   performed.    < end of copied text >        O:   General: Pleasant  female NAD & AOX3.    Integument:  Skin warm, dry and supple bilateral.    Right foot digits 1 and 2 with dry gangrene present distally. Does not appears clinically infected. No malodor.   Vascular: Dorsalis Pedis and Posterior Tibial pulses nonpalpable right foot. Left foot palpable DP and PT pulses.  Capillary re-fill time less then 3 seconds digits 1-5 bilateral.    Neuro: Protective sensation diminished to the level of the digits bilateral.  MSK: Muscle strength 5/5 all major muscle groups bilateral.  Deformity:  A: Right foot dry gangrene       P:   Chart reviewed and Patient evaluated  Discussed diagnosis and treatment with patient  X-rays reviewed : Shows no OM.   Continue with IV antibiotics  RAMIRO reviewed. Awaiting vascular recommendations if patient is a candidate for revasc.  Weight bearing as tolerated.  Offloading to bilateral Heels in bed.   Discussed with Attending Dr. Holcomb

## 2018-04-14 NOTE — CONSULT NOTE ADULT - ASSESSMENT
right foot cellulitis - improving  right 1st and 2nd toe dry gangrene - no ulcers on feet    plan - cont unasyn 3 gms iv q6 hrs  vascular attending has to see pt
78 y old female w art insuff and dry gang of rt toe 1    d/w pt to proceed w rle angio  indications risks and benefits d/w pt pt wants to d/w her prim care doc this  will follow

## 2018-04-14 NOTE — CONSULT NOTE ADULT - EXTREMITIES COMMENTS
mild to mod arterial insuff w mod trophic skin changes  rt toe 1 tip medial aspect  w 2mm diam dry gangrene

## 2018-04-14 NOTE — CONSULT NOTE ADULT - SKIN COMMENTS
erythema, warmth and swelling of right foot almost to ankle which now shows wrinkling of skin, she has some dry gangrene of right 1st toe around nailbed which looks superficial and on the plantar aspect of right 2nd toe also looks superficial

## 2018-04-15 LAB
ANION GAP SERPL CALC-SCNC: 5 MMOL/L — SIGNIFICANT CHANGE UP (ref 5–17)
BASOPHILS # BLD AUTO: 0 K/UL — SIGNIFICANT CHANGE UP (ref 0–0.2)
BASOPHILS NFR BLD AUTO: 0.8 % — SIGNIFICANT CHANGE UP (ref 0–2)
BUN SERPL-MCNC: 20 MG/DL — HIGH (ref 7–18)
CALCIUM SERPL-MCNC: 9.4 MG/DL — SIGNIFICANT CHANGE UP (ref 8.4–10.5)
CHLORIDE SERPL-SCNC: 105 MMOL/L — SIGNIFICANT CHANGE UP (ref 96–108)
CO2 SERPL-SCNC: 28 MMOL/L — SIGNIFICANT CHANGE UP (ref 22–31)
CREAT SERPL-MCNC: 0.98 MG/DL — SIGNIFICANT CHANGE UP (ref 0.5–1.3)
EOSINOPHIL # BLD AUTO: 0.2 K/UL — SIGNIFICANT CHANGE UP (ref 0–0.5)
EOSINOPHIL NFR BLD AUTO: 3.2 % — SIGNIFICANT CHANGE UP (ref 0–6)
GLUCOSE BLDC GLUCOMTR-MCNC: 122 MG/DL — HIGH (ref 70–99)
GLUCOSE BLDC GLUCOMTR-MCNC: 123 MG/DL — HIGH (ref 70–99)
GLUCOSE BLDC GLUCOMTR-MCNC: 220 MG/DL — HIGH (ref 70–99)
GLUCOSE BLDC GLUCOMTR-MCNC: 75 MG/DL — SIGNIFICANT CHANGE UP (ref 70–99)
GLUCOSE BLDC GLUCOMTR-MCNC: 94 MG/DL — SIGNIFICANT CHANGE UP (ref 70–99)
GLUCOSE SERPL-MCNC: 131 MG/DL — HIGH (ref 70–99)
HCT VFR BLD CALC: 39.5 % — SIGNIFICANT CHANGE UP (ref 34.5–45)
HGB BLD-MCNC: 11.9 G/DL — SIGNIFICANT CHANGE UP (ref 11.5–15.5)
LYMPHOCYTES # BLD AUTO: 1.7 K/UL — SIGNIFICANT CHANGE UP (ref 1–3.3)
LYMPHOCYTES # BLD AUTO: 34.5 % — SIGNIFICANT CHANGE UP (ref 13–44)
MAGNESIUM SERPL-MCNC: 2 MG/DL — SIGNIFICANT CHANGE UP (ref 1.6–2.6)
MCHC RBC-ENTMCNC: 24.2 PG — LOW (ref 27–34)
MCHC RBC-ENTMCNC: 30.2 GM/DL — LOW (ref 32–36)
MCV RBC AUTO: 80.1 FL — SIGNIFICANT CHANGE UP (ref 80–100)
MONOCYTES # BLD AUTO: 0.5 K/UL — SIGNIFICANT CHANGE UP (ref 0–0.9)
MONOCYTES NFR BLD AUTO: 9.5 % — SIGNIFICANT CHANGE UP (ref 2–14)
NEUTROPHILS # BLD AUTO: 2.5 K/UL — SIGNIFICANT CHANGE UP (ref 1.8–7.4)
NEUTROPHILS NFR BLD AUTO: 52.1 % — SIGNIFICANT CHANGE UP (ref 43–77)
PHOSPHATE SERPL-MCNC: 2.8 MG/DL — SIGNIFICANT CHANGE UP (ref 2.5–4.5)
PLATELET # BLD AUTO: 258 K/UL — SIGNIFICANT CHANGE UP (ref 150–400)
POTASSIUM SERPL-MCNC: 4.7 MMOL/L — SIGNIFICANT CHANGE UP (ref 3.5–5.3)
POTASSIUM SERPL-SCNC: 4.7 MMOL/L — SIGNIFICANT CHANGE UP (ref 3.5–5.3)
RBC # BLD: 4.94 M/UL — SIGNIFICANT CHANGE UP (ref 3.8–5.2)
RBC # FLD: 15.2 % — HIGH (ref 10.3–14.5)
SODIUM SERPL-SCNC: 138 MMOL/L — SIGNIFICANT CHANGE UP (ref 135–145)
WBC # BLD: 4.9 K/UL — SIGNIFICANT CHANGE UP (ref 3.8–10.5)
WBC # FLD AUTO: 4.9 K/UL — SIGNIFICANT CHANGE UP (ref 3.8–10.5)

## 2018-04-15 PROCEDURE — 99232 SBSQ HOSP IP/OBS MODERATE 35: CPT

## 2018-04-15 RX ADMIN — ENOXAPARIN SODIUM 40 MILLIGRAM(S): 100 INJECTION SUBCUTANEOUS at 12:04

## 2018-04-15 RX ADMIN — Medication 81 MILLIGRAM(S): at 12:05

## 2018-04-15 RX ADMIN — ATORVASTATIN CALCIUM 40 MILLIGRAM(S): 80 TABLET, FILM COATED ORAL at 22:38

## 2018-04-15 RX ADMIN — INSULIN GLARGINE 70 UNIT(S): 100 INJECTION, SOLUTION SUBCUTANEOUS at 08:19

## 2018-04-15 RX ADMIN — AMPICILLIN SODIUM AND SULBACTAM SODIUM 200 GRAM(S): 250; 125 INJECTION, POWDER, FOR SUSPENSION INTRAMUSCULAR; INTRAVENOUS at 17:49

## 2018-04-15 RX ADMIN — AMPICILLIN SODIUM AND SULBACTAM SODIUM 200 GRAM(S): 250; 125 INJECTION, POWDER, FOR SUSPENSION INTRAMUSCULAR; INTRAVENOUS at 06:15

## 2018-04-15 RX ADMIN — Medication 4: at 12:05

## 2018-04-15 RX ADMIN — AMPICILLIN SODIUM AND SULBACTAM SODIUM 200 GRAM(S): 250; 125 INJECTION, POWDER, FOR SUSPENSION INTRAMUSCULAR; INTRAVENOUS at 12:09

## 2018-04-15 RX ADMIN — AMPICILLIN SODIUM AND SULBACTAM SODIUM 200 GRAM(S): 250; 125 INJECTION, POWDER, FOR SUSPENSION INTRAMUSCULAR; INTRAVENOUS at 00:10

## 2018-04-15 NOTE — PROGRESS NOTE ADULT - SUBJECTIVE AND OBJECTIVE BOX
Patient is a 78y old  Female who presents with a chief complaint of Right toe gangrene from unknown onset. (13 Apr 2018 22:05)      Vascular Surgery Attending Progress Note    Interval HPI: pt w/o c/o Wants to be d/c home so she can d/w her podiatrist the vascular findings     Medications:  ampicillin/sulbactam  IVPB 3 Gram(s) IV Intermittent every 6 hours  aspirin  chewable 81 milliGRAM(s) Oral daily  atorvastatin 40 milliGRAM(s) Oral at bedtime  enoxaparin Injectable 40 milliGRAM(s) SubCutaneous daily  insulin glargine Injectable (LANTUS) 70 Unit(s) SubCutaneous every morning  insulin lispro (HumaLOG) corrective regimen sliding scale   SubCutaneous Before meals and at bedtime      Vital Signs Last 24 Hrs  T(C): 36.4 (15 Apr 2018 20:45), Max: 37.1 (15 Apr 2018 05:46)  T(F): 97.5 (15 Apr 2018 20:45), Max: 98.7 (15 Apr 2018 05:46)  HR: 74 (15 Apr 2018 20:45) (74 - 100)  BP: 152/67 (15 Apr 2018 20:45) (138/72 - 157/80)  BP(mean): --  RR: 16 (15 Apr 2018 20:45) (14 - 16)  SpO2: 100% (15 Apr 2018 20:45) (100% - 100%)  I&O's Summary      Physical Exam:  Neuro  A&Ox3 VSS  Vascular:   rt toe 1 tip dry gangrene stable   Musculoskeletal: wnl    LABS:                        11.9   4.9   )-----------( 258      ( 15 Apr 2018 08:07 )             39.5     04-15    138  |  105  |  20<H>  ----------------------------<  131<H>  4.7   |  28  |  0.98    Ca    9.4      15 Apr 2018 08:07  Phos  2.8     04-15  Mg     2.0     04-15      PT/INR - ( 14 Apr 2018 07:54 )   PT: 13.2 sec;   INR: 1.21 ratio         PTT - ( 14 Apr 2018 07:54 )  PTT:42.7 sec    ELIDA MILNER MD  [phone]

## 2018-04-15 NOTE — PROGRESS NOTE ADULT - PROBLEM SELECTOR PLAN 1
appreciate iD / VASCULAR input   cont present abx   pt want to be dced , want to discuss with PMD about angio of lower ext

## 2018-04-15 NOTE — PROGRESS NOTE ADULT - ASSESSMENT
78 y old female w art insuff and dry gang of rt toe 1    woundcare (betadine) daily, protective measures  recommend pletal 50 bid  f/u as outpt if pt decides to proceed w rle angio  reconsult prn

## 2018-04-15 NOTE — PROGRESS NOTE ADULT - SUBJECTIVE AND OBJECTIVE BOX
78y Female    Meds:  ampicillin/sulbactam  IVPB 3 Gram(s) IV Intermittent every 6 hours    Allergies    No Known Allergies    Intolerances        VITALS:  Vital Signs Last 24 Hrs  T(C): 37.1 (15 Apr 2018 05:46), Max: 37.1 (15 Apr 2018 05:46)  T(F): 98.7 (15 Apr 2018 05:46), Max: 98.7 (15 Apr 2018 05:46)  HR: 79 (15 Apr 2018 05:46) (72 - 128)  BP: 157/80 (15 Apr 2018 05:46) (122/65 - 157/80)  BP(mean): 77 (14 Apr 2018 13:34) (77 - 77)  RR: 14 (15 Apr 2018 05:46) (14 - 16)  SpO2: 100% (15 Apr 2018 05:46) (100% - 100%)    LABS/DIAGNOSTIC TESTS:                          11.9   4.9   )-----------( 258      ( 15 Apr 2018 08:07 )             39.5         04-15    138  |  105  |  20<H>  ----------------------------<  131<H>  4.7   |  28  |  0.98    Ca    9.4      15 Apr 2018 08:07  Phos  2.8     04-15  Mg     2.0     04-15    TPro  7.5  /  Alb  2.8<L>  /  TBili  0.4  /  DBili  x   /  AST  38  /  ALT  41  /  AlkPhos  91  04-13      LIVER FUNCTIONS - ( 13 Apr 2018 17:49 )  Alb: 2.8 g/dL / Pro: 7.5 g/dL / ALK PHOS: 91 U/L / ALT: 41 U/L DA / AST: 38 U/L / GGT: x             CULTURES: .Blood Blood-Peripheral  04-14 @ 00:57   No growth to date.  --  --            RADIOLOGY:      ROS:  [  ] UNABLE TO ELICIT 78y Female who is doing a little better, she has less right foot swelling, she was seen by the vascular attending and advised to get a right leg angiogram but is refusing until she physically sees her PMD and he says it is ok and then will go back to see vascular surgeon , she is aware of the risks of this getting worse and that it could mean limb loss if she does not do this ASAP.    Meds:  ampicillin/sulbactam  IVPB 3 Gram(s) IV Intermittent every 6 hours    Allergies    No Known Allergies    Intolerances        VITALS:  Vital Signs Last 24 Hrs  T(C): 37.1 (15 Apr 2018 05:46), Max: 37.1 (15 Apr 2018 05:46)  T(F): 98.7 (15 Apr 2018 05:46), Max: 98.7 (15 Apr 2018 05:46)  HR: 79 (15 Apr 2018 05:46) (72 - 128)  BP: 157/80 (15 Apr 2018 05:46) (122/65 - 157/80)  BP(mean): 77 (14 Apr 2018 13:34) (77 - 77)  RR: 14 (15 Apr 2018 05:46) (14 - 16)  SpO2: 100% (15 Apr 2018 05:46) (100% - 100%)    LABS/DIAGNOSTIC TESTS:                          11.9   4.9   )-----------( 258      ( 15 Apr 2018 08:07 )             39.5         04-15    138  |  105  |  20<H>  ----------------------------<  131<H>  4.7   |  28  |  0.98    Ca    9.4      15 Apr 2018 08:07  Phos  2.8     04-15  Mg     2.0     04-15    TPro  7.5  /  Alb  2.8<L>  /  TBili  0.4  /  DBili  x   /  AST  38  /  ALT  41  /  AlkPhos  91  04-13      LIVER FUNCTIONS - ( 13 Apr 2018 17:49 )  Alb: 2.8 g/dL / Pro: 7.5 g/dL / ALK PHOS: 91 U/L / ALT: 41 U/L DA / AST: 38 U/L / GGT: x             CULTURES: .Blood Blood-Peripheral  04-14 @ 00:57   No growth to date.  --  --            RADIOLOGY:      ROS:  [  ] UNABLE TO ELICIT

## 2018-04-15 NOTE — PROGRESS NOTE ADULT - ASSESSMENT
cellulitis of right foot  dry gangrene of right 1st and 2nd toes     plan - cont unasyn 3gms iv q6 hrs  will plan to dc home tomorrow on augmentin 875mgs po bid x 7 days  she states she will take a cab from here to her PMD office and discuss about angiogram before making a decision.

## 2018-04-15 NOTE — PROGRESS NOTE ADULT - SKIN COMMENTS
erythema and warmth of right foot is dramatically better, she has dry gangrene of tip of right 1st toe and 2nd toe

## 2018-04-15 NOTE — PROGRESS NOTE ADULT - SUBJECTIVE AND OBJECTIVE BOX
chief complaint : rt foot swelling         SUBJECTIVE / OVERNIGHT EVENTS: pt says her rt foot feels much better       MEDICATIONS  (STANDING):  ampicillin/sulbactam  IVPB 3 Gram(s) IV Intermittent every 6 hours  aspirin  chewable 81 milliGRAM(s) Oral daily  atorvastatin 40 milliGRAM(s) Oral at bedtime  enoxaparin Injectable 40 milliGRAM(s) SubCutaneous daily  insulin glargine Injectable (LANTUS) 70 Unit(s) SubCutaneous every morning  insulin lispro (HumaLOG) corrective regimen sliding scale   SubCutaneous Before meals and at bedtime    MEDICATIONS  (PRN):        CAPILLARY BLOOD GLUCOSE      POCT Blood Glucose.: 94 mg/dL (15 Apr 2018 16:45)  POCT Blood Glucose.: 220 mg/dL (15 Apr 2018 12:04)  POCT Blood Glucose.: 123 mg/dL (15 Apr 2018 07:32)  POCT Blood Glucose.: 171 mg/dL (14 Apr 2018 21:22)    I&O's Summary    Vital Signs Last 24 Hrs  T(C): 36.4 (15 Apr 2018 20:45), Max: 37.1 (15 Apr 2018 05:46)  T(F): 97.5 (15 Apr 2018 20:45), Max: 98.7 (15 Apr 2018 05:46)  HR: 74 (15 Apr 2018 20:45) (74 - 100)  BP: 152/67 (15 Apr 2018 20:45) (138/72 - 157/80)  BP(mean): --  RR: 16 (15 Apr 2018 20:45) (14 - 16)  SpO2: 100% (15 Apr 2018 20:45) (100% - 100%)    Constitutional: No fever, fatigue  Skin: No rash.  Eyes: No recent vision problems or eye pain.  ENT: No congestion, ear pain, or sore throat.  Cardiovascular: No chest pain or palpation.  Respiratory: No cough, shortness of breath, congestion, or wheezing.  Gastrointestinal: No abdominal pain, nausea, vomiting, or diarrhea.  Genitourinary: No dysuria.  Musculoskeletal:  rt foot swelling +  Neurologic: No headache.    PHYSICAL EXAM:  GENERAL: NAD  EYES: EOMI, PERRLA  NECK: Supple, No JVD  CHEST/LUNG: cta lisa   HEART:  S1 , S2 +  ABDOMEN: soft , bs+   EXTREMITIES:  dec erythema of rt foot , dry gangrene tip of rt 1 and  2 nd toes  	        LABS:                        11.9   4.9   )-----------( 258      ( 15 Apr 2018 08:07 )             39.5     04-15    138  |  105  |  20<H>  ----------------------------<  131<H>  4.7   |  28  |  0.98    Ca    9.4      15 Apr 2018 08:07  Phos  2.8     04-15  Mg     2.0     04-15      PT/INR - ( 14 Apr 2018 07:54 )   PT: 13.2 sec;   INR: 1.21 ratio         PTT - ( 14 Apr 2018 07:54 )  PTT:42.7 sec          RADIOLOGY & ADDITIONAL TESTS:    Imaging Personally Reviewed:    Consultant(s) Notes Reviewed:      Care Discussed with Consultants/Other Providers:

## 2018-04-16 ENCOUNTER — TRANSCRIPTION ENCOUNTER (OUTPATIENT)
Age: 78
End: 2018-04-16

## 2018-04-16 VITALS
HEART RATE: 77 BPM | RESPIRATION RATE: 16 BRPM | TEMPERATURE: 98 F | DIASTOLIC BLOOD PRESSURE: 86 MMHG | OXYGEN SATURATION: 100 % | SYSTOLIC BLOOD PRESSURE: 131 MMHG

## 2018-04-16 LAB
ANION GAP SERPL CALC-SCNC: 6 MMOL/L — SIGNIFICANT CHANGE UP (ref 5–17)
BASOPHILS # BLD AUTO: 0.1 K/UL — SIGNIFICANT CHANGE UP (ref 0–0.2)
BASOPHILS NFR BLD AUTO: 1.6 % — SIGNIFICANT CHANGE UP (ref 0–2)
BUN SERPL-MCNC: 23 MG/DL — HIGH (ref 7–18)
CALCIUM SERPL-MCNC: 9.1 MG/DL — SIGNIFICANT CHANGE UP (ref 8.4–10.5)
CHLORIDE SERPL-SCNC: 108 MMOL/L — SIGNIFICANT CHANGE UP (ref 96–108)
CO2 SERPL-SCNC: 25 MMOL/L — SIGNIFICANT CHANGE UP (ref 22–31)
CREAT SERPL-MCNC: 1.13 MG/DL — SIGNIFICANT CHANGE UP (ref 0.5–1.3)
EOSINOPHIL # BLD AUTO: 0.1 K/UL — SIGNIFICANT CHANGE UP (ref 0–0.5)
EOSINOPHIL NFR BLD AUTO: 3.1 % — SIGNIFICANT CHANGE UP (ref 0–6)
GLUCOSE BLDC GLUCOMTR-MCNC: 178 MG/DL — HIGH (ref 70–99)
GLUCOSE BLDC GLUCOMTR-MCNC: 183 MG/DL — HIGH (ref 70–99)
GLUCOSE BLDC GLUCOMTR-MCNC: 61 MG/DL — LOW (ref 70–99)
GLUCOSE SERPL-MCNC: 63 MG/DL — LOW (ref 70–99)
HCT VFR BLD CALC: 34.3 % — LOW (ref 34.5–45)
HGB BLD-MCNC: 11 G/DL — LOW (ref 11.5–15.5)
LYMPHOCYTES # BLD AUTO: 1.6 K/UL — SIGNIFICANT CHANGE UP (ref 1–3.3)
LYMPHOCYTES # BLD AUTO: 33.3 % — SIGNIFICANT CHANGE UP (ref 13–44)
MAGNESIUM SERPL-MCNC: 2 MG/DL — SIGNIFICANT CHANGE UP (ref 1.6–2.6)
MCHC RBC-ENTMCNC: 26 PG — LOW (ref 27–34)
MCHC RBC-ENTMCNC: 32.2 GM/DL — SIGNIFICANT CHANGE UP (ref 32–36)
MCV RBC AUTO: 80.6 FL — SIGNIFICANT CHANGE UP (ref 80–100)
MONOCYTES # BLD AUTO: 0.5 K/UL — SIGNIFICANT CHANGE UP (ref 0–0.9)
MONOCYTES NFR BLD AUTO: 11.1 % — SIGNIFICANT CHANGE UP (ref 2–14)
NEUTROPHILS # BLD AUTO: 2.4 K/UL — SIGNIFICANT CHANGE UP (ref 1.8–7.4)
NEUTROPHILS NFR BLD AUTO: 50.9 % — SIGNIFICANT CHANGE UP (ref 43–77)
PHOSPHATE SERPL-MCNC: 3 MG/DL — SIGNIFICANT CHANGE UP (ref 2.5–4.5)
PLATELET # BLD AUTO: 228 K/UL — SIGNIFICANT CHANGE UP (ref 150–400)
POTASSIUM SERPL-MCNC: 4.4 MMOL/L — SIGNIFICANT CHANGE UP (ref 3.5–5.3)
POTASSIUM SERPL-SCNC: 4.4 MMOL/L — SIGNIFICANT CHANGE UP (ref 3.5–5.3)
RBC # BLD: 4.25 M/UL — SIGNIFICANT CHANGE UP (ref 3.8–5.2)
RBC # FLD: 15.1 % — HIGH (ref 10.3–14.5)
SODIUM SERPL-SCNC: 139 MMOL/L — SIGNIFICANT CHANGE UP (ref 135–145)
WBC # BLD: 4.8 K/UL — SIGNIFICANT CHANGE UP (ref 3.8–10.5)
WBC # FLD AUTO: 4.8 K/UL — SIGNIFICANT CHANGE UP (ref 3.8–10.5)

## 2018-04-16 PROCEDURE — 80053 COMPREHEN METABOLIC PANEL: CPT

## 2018-04-16 PROCEDURE — 84100 ASSAY OF PHOSPHORUS: CPT

## 2018-04-16 PROCEDURE — 85730 THROMBOPLASTIN TIME PARTIAL: CPT

## 2018-04-16 PROCEDURE — 82962 GLUCOSE BLOOD TEST: CPT

## 2018-04-16 PROCEDURE — 86140 C-REACTIVE PROTEIN: CPT

## 2018-04-16 PROCEDURE — 83036 HEMOGLOBIN GLYCOSYLATED A1C: CPT

## 2018-04-16 PROCEDURE — 82803 BLOOD GASES ANY COMBINATION: CPT

## 2018-04-16 PROCEDURE — 83605 ASSAY OF LACTIC ACID: CPT

## 2018-04-16 PROCEDURE — 84443 ASSAY THYROID STIM HORMONE: CPT

## 2018-04-16 PROCEDURE — 73630 X-RAY EXAM OF FOOT: CPT

## 2018-04-16 PROCEDURE — 80061 LIPID PANEL: CPT

## 2018-04-16 PROCEDURE — 93971 EXTREMITY STUDY: CPT

## 2018-04-16 PROCEDURE — 82009 KETONE BODYS QUAL: CPT

## 2018-04-16 PROCEDURE — 82746 ASSAY OF FOLIC ACID SERUM: CPT

## 2018-04-16 PROCEDURE — 85610 PROTHROMBIN TIME: CPT

## 2018-04-16 PROCEDURE — 80048 BASIC METABOLIC PNL TOTAL CA: CPT

## 2018-04-16 PROCEDURE — 82607 VITAMIN B-12: CPT

## 2018-04-16 PROCEDURE — 85027 COMPLETE CBC AUTOMATED: CPT

## 2018-04-16 PROCEDURE — 99285 EMERGENCY DEPT VISIT HI MDM: CPT | Mod: 25

## 2018-04-16 PROCEDURE — 87040 BLOOD CULTURE FOR BACTERIA: CPT

## 2018-04-16 PROCEDURE — 85652 RBC SED RATE AUTOMATED: CPT

## 2018-04-16 PROCEDURE — 83735 ASSAY OF MAGNESIUM: CPT

## 2018-04-16 PROCEDURE — 93925 LOWER EXTREMITY STUDY: CPT

## 2018-04-16 RX ORDER — ATORVASTATIN CALCIUM 80 MG/1
1 TABLET, FILM COATED ORAL
Qty: 0 | Refills: 0 | DISCHARGE
Start: 2018-04-16

## 2018-04-16 RX ORDER — CILOSTAZOL 100 MG/1
1 TABLET ORAL
Qty: 60 | Refills: 0
Start: 2018-04-16 | End: 2018-05-15

## 2018-04-16 RX ADMIN — ENOXAPARIN SODIUM 40 MILLIGRAM(S): 100 INJECTION SUBCUTANEOUS at 12:01

## 2018-04-16 RX ADMIN — AMPICILLIN SODIUM AND SULBACTAM SODIUM 200 GRAM(S): 250; 125 INJECTION, POWDER, FOR SUSPENSION INTRAMUSCULAR; INTRAVENOUS at 00:30

## 2018-04-16 RX ADMIN — AMPICILLIN SODIUM AND SULBACTAM SODIUM 200 GRAM(S): 250; 125 INJECTION, POWDER, FOR SUSPENSION INTRAMUSCULAR; INTRAVENOUS at 06:32

## 2018-04-16 RX ADMIN — Medication 81 MILLIGRAM(S): at 12:01

## 2018-04-16 RX ADMIN — Medication 2: at 12:00

## 2018-04-16 NOTE — PROGRESS NOTE ADULT - SUBJECTIVE AND OBJECTIVE BOX
78y Female seen bedside today and states that she is feeling much better today, and states that she will be going back to her outside doctor to discuss possible vascular surgery in the future. Patient states that she is aware of the risks of not getting the surgery at this time.    Meds:  ampicillin/sulbactam  IVPB 3 Gram(s) IV Intermittent every 6 hours    Allergies: No Known Allergies    VITALS:  Vital Signs Last 24 Hrs  T(C): 36.8 (16 Apr 2018 05:34), Max: 37 (15 Apr 2018 14:13)  T(F): 98.3 (16 Apr 2018 05:34), Max: 98.6 (15 Apr 2018 14:13)  HR: 60 (16 Apr 2018 05:34) (60 - 100)  BP: 146/88 (16 Apr 2018 05:34) (138/72 - 152/67)  RR: 16 (16 Apr 2018 05:34) (16 - 16)  SpO2: 100% (16 Apr 2018 05:34) (100% - 100%)    LABS/DIAGNOSTIC TESTS:                          11.0   4.8   )-----------( 228      ( 16 Apr 2018 07:51 )             34.3     04-16    139  |  108  |  23<H>  ----------------------------<  63<L>  4.4   |  25  |  1.13    Ca    9.1      16 Apr 2018 07:51  Phos  3.0     04-16  Mg     2.0     04-16    CULTURES: .Blood Blood-Peripheral  04-14 @ 17:03   No growth to date.    Blood Blood-Peripheral  04-14 @ 00:57   No growth to date.    RADIOLOGY:  < from: US Duplex Arterial Lower Ext Compl, Bilateral (04.13.18 @ 17:04) >  EXAM:  US DPLX LWR EXT ARTS COMPL BI                            PROCEDURE DATE:  04/13/2018          INTERPRETATION:  Clinical Information: Right toe swelling.    Technique: Bilateral lower extremity ABIs/PVR    Comparison: None.    Findings:  Right lower extremity: The ankle brachial index is 1.51. The pulse   waveforms are moderately diminished at the right digits.    Left lower extremity: The ankle brachial index is 1.5. The pulse   waveforms are normal throughout the extremity.    Impression: Normal bilateral RAMIRO. Moderately diminished pulse waveforms   at the level of the right digits.    < end of copied text >

## 2018-04-16 NOTE — PROGRESS NOTE ADULT - ASSESSMENT
A: Right foot cellulitis, resolving    P: d/w Dr. Quinteros  patient to be discharged today on Augmentin 875 mg BID for 1 week  patient to follow up with Vascular as out patient A: Right foot cellulitis, resolving  and gangrene (dry) of right 1st and 2nd toes    P: d/w Dr. Quinteros  patient to be discharged today on Augmentin 875 mg BID for 1 week  patient to follow up with Vascular as out patient

## 2018-04-16 NOTE — PROGRESS NOTE ADULT - GASTROINTESTINAL DETAILS
soft/normal/nontender/no rigidity/no organomegaly
no rigidity/no guarding/nontender/no distention/bowel sounds normal/soft

## 2018-04-16 NOTE — PROGRESS NOTE ADULT - RS GEN PE MLT RESP DETAILS PC
normal/no wheezes/good air movement/no rhonchi/no rales/clear to auscultation bilaterally
no rhonchi/no wheezes/good air movement/no rales

## 2018-04-16 NOTE — DISCHARGE NOTE ADULT - CARE PROVIDER_API CALL
Pool Holland), Vascular Surgery  [address] [facility]  [address]  [place] [address]  Phone: [phone]  Fax: [phone]    Rupinder Lunsford), Sleep Medicine  [address] [place] [address]  Phone: [phone]  Fax: [phone]

## 2018-04-16 NOTE — DISCHARGE NOTE ADULT - HOSPITAL COURSE
78 years old female lives alone at home, walks independently, with PMH of IDDM, HTN, MI, and CAD (s/p stents) and PSH of Coronary Artery Stent Placement and Hysterectomy was referred by podiatrist/ PMD for cellulitis and suspected vascular complication of the right foot today.  Pt reported having foot issue for many years, has been regularly followed local podiatrist. However since last month she has had numbness/decreased sensation of the right foot along with mild foot pain, and it has been worsening over last week. Pt he was also seen at [facility] and [facility] multiple times.    In ED, pt was hemodynamically stable, with grossly normal labs except H/H of 10.4/34.1. X-ray of the Rt foot showed no evidence for acute fracture or dislocation. Soft tissue swelling. No distinct regions of osteolysis or osteosclerosis identified.   Arterial and venous doppler were also performed; Normal bilateral RAMIRO. Moderately diminished pulse waveforms at the level of the right digits.    Pt is admitted to the medicine floor for right great toe dry gangrene, concern for cellulitis.    Gangrene associated with diabetes mellitus, ,ikely from diminished distal flow, consulted Dr. Gold, vascular surgeon consulted and recommended a right lower extremity angio.  Dr. Harley, podiatry, consulted  Dr. Holland, vascular surgeon consulted and diagnosed patient with arterial insufficiency of the lower extremity.  He recommended daily wound care with betadine and pletal 50 mg. bid.  Pt is to followup as outpatient to proceed with right lower extremity angio.    .   Cellulitis and abscess of foot.   Dr. Quinteros, ID, consulted and started patient on  Unasyn IV.  BC X 2 negative to date.    Pt with no leukocytosis and afebrile.  Dr. Quinteros noted that the cellulitis was resolving and  discharged patient on Augmentin X 7 days, with followup  with Dr. Holland, vascular surgeon.      Diabetes.    Home meds were held, and  patient was placed on a HSS along with Lantus.  A1c was 7.6  Accuchecks were monitored  Pt maintained on ADA/DASH  Problem/Plan - 4:    Hypertension  BP was monitored/stable  Pt continued with home antihypertensive medications   DASH/ADA diet    CAD (coronary artery disease).   Lipid profile was performed and  pt started on   aspirin and lipitor      Need for prophylactic measure.   Pt was given Lovenox, subq for DVT ppx.      Attending cleared patient for discharge home with   Pletal 50 mg., and bid, Augmentin 875 mg bid X 7 days with followup with Dr. Holland, vascular surgeon, in two to three days, as well as her PCP in two to three days.

## 2018-04-16 NOTE — DISCHARGE NOTE ADULT - CARE PLAN
Principal Discharge DX:	Arterial insufficiency of lower extremity  Goal:	resolution  Assessment and plan of treatment:	Continue with Pletal and followup with Dr. Holland, vascular surgeon, in two to three days.  Continue to apply Betadine, daily, to right toe.  Secondary Diagnosis:	Cellulitis and abscess of foot  Assessment and plan of treatment:	Complete seven day course of Augmentin, as prescribed.  Followup with your PCP in two to three days.  Secondary Diagnosis:	CAD (coronary artery disease)  Assessment and plan of treatment:	Continue with statin.  Followup with PCP in two to three days.  Secondary Diagnosis:	Diabetes  Assessment and plan of treatment:	Continue with home meds.  Followup with PCP in two to three days.  Secondary Diagnosis:	Hypertension  Assessment and plan of treatment:	Continue with your home antihypertensive meds and followup with PCP in two to three days.  Secondary Diagnosis:	Hyperlipemia  Assessment and plan of treatment:	Continue with statin and followup with PCP in two to three days.

## 2018-04-16 NOTE — DISCHARGE NOTE ADULT - PLAN OF CARE
resolution Continue with Pletal and followup with Dr. Holland, vascular surgeon, in two to three days.  Continue to apply Betadine, daily, to right toe. Complete seven day course of Augmentin, as prescribed.  Followup with your PCP in two to three days. Continue with statin.  Followup with PCP in two to three days. Continue with home meds.  Followup with PCP in two to three days. Continue with your home antihypertensive meds and followup with PCP in two to three days. Continue with statin and followup with PCP in two to three days.

## 2018-04-16 NOTE — DISCHARGE NOTE ADULT - MEDICATION SUMMARY - MEDICATIONS TO TAKE
I will START or STAY ON the medications listed below when I get home from the hospital:    losartan 25 mg oral tablet  -- 1 tab(s) by mouth once a day  -- Indication: For HTN    Levemir 100 units/mL subcutaneous solution  -- 70 unit(s) subcutaneous once a day  -- Indication: For DM    atorvastatin 40 mg oral tablet  -- 1 tab(s) by mouth once a day (at bedtime)  -- Indication: For HLD    Metoprolol Succinate ER 25 mg oral tablet, extended release  -- 1 tab(s) by mouth once a day  -- Indication: For HTN    amLODIPine 5 mg oral tablet  -- 1 tab(s) by mouth once a day  -- Indication: For HTN    cilostazol 50 mg oral tablet  -- 1 tab(s) by mouth 2 times a day   -- Medication should be taken with plenty of water.  Take medication on an empty stomach 1 hour before or 2 to 3 hours after a meal unless otherwise directed by your doctor.    -- Indication: For Arterial insufficiency of lower extremity    Augmentin 875 mg-125 mg oral tablet  -- 875 milligram(s) by mouth every 12 hours   -- Finish all this medication unless otherwise directed by prescriber.  Take with food or milk.    -- Indication: For Cellulitis and abscess of foot

## 2018-04-16 NOTE — DISCHARGE NOTE ADULT - MEDICATION SUMMARY - MEDICATIONS TO STOP TAKING
I will STOP taking the medications listed below when I get home from the hospital:    traMADol 50 mg oral tablet  -- 1 tab(s) by mouth every 6 hours, As Needed -for moderate pain MDD:4  -- Caution federal law prohibits the transfer of this drug to any person other  than the person for whom it was prescribed.  May cause drowsiness.  Alcohol may intensify this effect.  Use care when operating dangerous machinery.  Obtain medical advice before taking any non-prescription drugs as some may affect the action of this medication.

## 2018-04-16 NOTE — DISCHARGE NOTE ADULT - PATIENT PORTAL LINK FT
You can access the [facility] Patient Portal, offered by [facility], by registering with the following website: [email or web address]

## 2018-04-18 PROBLEM — Z00.00 ENCOUNTER FOR PREVENTIVE HEALTH EXAMINATION: Status: ACTIVE | Noted: 2018-04-18

## 2018-04-19 ENCOUNTER — APPOINTMENT (OUTPATIENT)
Dept: VASCULAR SURGERY | Facility: CLINIC | Age: 78
End: 2018-04-19
Payer: MEDICARE

## 2018-04-19 ENCOUNTER — INPATIENT (INPATIENT)
Facility: HOSPITAL | Age: 78
LOS: 5 days | Discharge: HOME | End: 2018-04-25
Attending: SURGERY | Admitting: SURGERY
Payer: MEDICARE

## 2018-04-19 VITALS
OXYGEN SATURATION: 100 % | SYSTOLIC BLOOD PRESSURE: 153 MMHG | RESPIRATION RATE: 18 BRPM | HEART RATE: 85 BPM | TEMPERATURE: 98 F | DIASTOLIC BLOOD PRESSURE: 90 MMHG

## 2018-04-19 VITALS
DIASTOLIC BLOOD PRESSURE: 63 MMHG | HEART RATE: 109 BPM | SYSTOLIC BLOOD PRESSURE: 94 MMHG | TEMPERATURE: 98.4 F | HEIGHT: 65 IN

## 2018-04-19 VITALS — DIASTOLIC BLOOD PRESSURE: 72 MMHG | HEART RATE: 125 BPM | SYSTOLIC BLOOD PRESSURE: 138 MMHG

## 2018-04-19 DIAGNOSIS — I96 GANGRENE, NOT ELSEWHERE CLASSIFIED: ICD-10-CM

## 2018-04-19 DIAGNOSIS — Z95.5 PRESENCE OF CORONARY ANGIOPLASTY IMPLANT AND GRAFT: Chronic | ICD-10-CM

## 2018-04-19 DIAGNOSIS — Z95.1 PRESENCE OF AORTOCORONARY BYPASS GRAFT: Chronic | ICD-10-CM

## 2018-04-19 DIAGNOSIS — Z98.890 OTHER SPECIFIED POSTPROCEDURAL STATES: Chronic | ICD-10-CM

## 2018-04-19 DIAGNOSIS — I73.9 PERIPHERAL VASCULAR DISEASE, UNSPECIFIED: ICD-10-CM

## 2018-04-19 PROBLEM — I10 ESSENTIAL (PRIMARY) HYPERTENSION: Chronic | Status: ACTIVE | Noted: 2018-04-13

## 2018-04-19 PROBLEM — I21.9 ACUTE MYOCARDIAL INFARCTION, UNSPECIFIED: Chronic | Status: ACTIVE | Noted: 2018-04-13

## 2018-04-19 LAB
ALBUMIN SERPL ELPH-MCNC: 4 G/DL — SIGNIFICANT CHANGE UP (ref 3.3–5)
ALP SERPL-CCNC: 101 U/L — SIGNIFICANT CHANGE UP (ref 40–120)
ALT FLD-CCNC: 29 U/L — SIGNIFICANT CHANGE UP (ref 4–33)
APTT BLD: 33.3 SEC — SIGNIFICANT CHANGE UP (ref 27.5–37.4)
AST SERPL-CCNC: 44 U/L — HIGH (ref 4–32)
BASOPHILS # BLD AUTO: 0.02 K/UL — SIGNIFICANT CHANGE UP (ref 0–0.2)
BASOPHILS NFR BLD AUTO: 0.3 % — SIGNIFICANT CHANGE UP (ref 0–2)
BILIRUB SERPL-MCNC: 0.7 MG/DL — SIGNIFICANT CHANGE UP (ref 0.2–1.2)
BUN SERPL-MCNC: 26 MG/DL — HIGH (ref 7–23)
BUN SERPL-MCNC: 33 MG/DL — HIGH (ref 7–23)
CALCIUM SERPL-MCNC: 9.3 MG/DL — SIGNIFICANT CHANGE UP (ref 8.4–10.5)
CALCIUM SERPL-MCNC: 9.6 MG/DL — SIGNIFICANT CHANGE UP (ref 8.4–10.5)
CHLORIDE SERPL-SCNC: 98 MMOL/L — SIGNIFICANT CHANGE UP (ref 98–107)
CHLORIDE SERPL-SCNC: 99 MMOL/L — SIGNIFICANT CHANGE UP (ref 98–107)
CO2 SERPL-SCNC: 23 MMOL/L — SIGNIFICANT CHANGE UP (ref 22–31)
CO2 SERPL-SCNC: 26 MMOL/L — SIGNIFICANT CHANGE UP (ref 22–31)
CREAT SERPL-MCNC: 1.18 MG/DL — SIGNIFICANT CHANGE UP (ref 0.5–1.3)
CREAT SERPL-MCNC: 1.27 MG/DL — SIGNIFICANT CHANGE UP (ref 0.5–1.3)
CRP SERPL-MCNC: 15.2 MG/L — HIGH
CULTURE RESULTS: SIGNIFICANT CHANGE UP
CULTURE RESULTS: SIGNIFICANT CHANGE UP
EOSINOPHIL # BLD AUTO: 0.12 K/UL — SIGNIFICANT CHANGE UP (ref 0–0.5)
EOSINOPHIL NFR BLD AUTO: 1.7 % — SIGNIFICANT CHANGE UP (ref 0–6)
ERYTHROCYTE [SEDIMENTATION RATE] IN BLOOD: 81 MM/HR — HIGH (ref 4–25)
GLUCOSE SERPL-MCNC: 184 MG/DL — HIGH (ref 70–99)
GLUCOSE SERPL-MCNC: 259 MG/DL — HIGH (ref 70–99)
HCT VFR BLD CALC: 38.2 % — SIGNIFICANT CHANGE UP (ref 34.5–45)
HGB BLD-MCNC: 11.6 G/DL — SIGNIFICANT CHANGE UP (ref 11.5–15.5)
IMM GRANULOCYTES # BLD AUTO: 0.01 # — SIGNIFICANT CHANGE UP
IMM GRANULOCYTES NFR BLD AUTO: 0.1 % — SIGNIFICANT CHANGE UP (ref 0–1.5)
INR BLD: 1.05 — SIGNIFICANT CHANGE UP (ref 0.88–1.17)
LYMPHOCYTES # BLD AUTO: 2.9 K/UL — SIGNIFICANT CHANGE UP (ref 1–3.3)
LYMPHOCYTES # BLD AUTO: 40.7 % — SIGNIFICANT CHANGE UP (ref 13–44)
MAGNESIUM SERPL-MCNC: 2.3 MG/DL — SIGNIFICANT CHANGE UP (ref 1.6–2.6)
MCHC RBC-ENTMCNC: 24.6 PG — LOW (ref 27–34)
MCHC RBC-ENTMCNC: 30.4 % — LOW (ref 32–36)
MCV RBC AUTO: 80.9 FL — SIGNIFICANT CHANGE UP (ref 80–100)
MONOCYTES # BLD AUTO: 0.68 K/UL — SIGNIFICANT CHANGE UP (ref 0–0.9)
MONOCYTES NFR BLD AUTO: 9.5 % — SIGNIFICANT CHANGE UP (ref 2–14)
NEUTROPHILS # BLD AUTO: 3.4 K/UL — SIGNIFICANT CHANGE UP (ref 1.8–7.4)
NEUTROPHILS NFR BLD AUTO: 47.7 % — SIGNIFICANT CHANGE UP (ref 43–77)
NRBC # FLD: 0 — SIGNIFICANT CHANGE UP
PHOSPHATE SERPL-MCNC: 2.6 MG/DL — SIGNIFICANT CHANGE UP (ref 2.5–4.5)
PLATELET # BLD AUTO: 270 K/UL — SIGNIFICANT CHANGE UP (ref 150–400)
PMV BLD: 10.8 FL — SIGNIFICANT CHANGE UP (ref 7–13)
POTASSIUM SERPL-MCNC: 4.6 MMOL/L — SIGNIFICANT CHANGE UP (ref 3.5–5.3)
POTASSIUM SERPL-MCNC: 6 MMOL/L — HIGH (ref 3.5–5.3)
POTASSIUM SERPL-MCNC: 6.6 MMOL/L — CRITICAL HIGH (ref 3.5–5.3)
POTASSIUM SERPL-SCNC: 4.6 MMOL/L — SIGNIFICANT CHANGE UP (ref 3.5–5.3)
POTASSIUM SERPL-SCNC: 6 MMOL/L — HIGH (ref 3.5–5.3)
POTASSIUM SERPL-SCNC: 6.6 MMOL/L — CRITICAL HIGH (ref 3.5–5.3)
PROT SERPL-MCNC: 8.7 G/DL — HIGH (ref 6–8.3)
PROTHROM AB SERPL-ACNC: 11.7 SEC — SIGNIFICANT CHANGE UP (ref 9.8–13.1)
RBC # BLD: 4.72 M/UL — SIGNIFICANT CHANGE UP (ref 3.8–5.2)
RBC # FLD: 16.2 % — HIGH (ref 10.3–14.5)
SODIUM SERPL-SCNC: 135 MMOL/L — SIGNIFICANT CHANGE UP (ref 135–145)
SODIUM SERPL-SCNC: 136 MMOL/L — SIGNIFICANT CHANGE UP (ref 135–145)
SPECIMEN SOURCE: SIGNIFICANT CHANGE UP
SPECIMEN SOURCE: SIGNIFICANT CHANGE UP
WBC # BLD: 7.13 K/UL — SIGNIFICANT CHANGE UP (ref 3.8–10.5)
WBC # FLD AUTO: 7.13 K/UL — SIGNIFICANT CHANGE UP (ref 3.8–10.5)

## 2018-04-19 PROCEDURE — ZZZZZ: CPT

## 2018-04-19 PROCEDURE — 99223 1ST HOSP IP/OBS HIGH 75: CPT

## 2018-04-19 PROCEDURE — 75635 CT ANGIO ABDOMINAL ARTERIES: CPT | Mod: 26

## 2018-04-19 PROCEDURE — 73630 X-RAY EXAM OF FOOT: CPT | Mod: 26,RT

## 2018-04-19 RX ORDER — PIPERACILLIN AND TAZOBACTAM 4; .5 G/20ML; G/20ML
3.38 INJECTION, POWDER, LYOPHILIZED, FOR SOLUTION INTRAVENOUS EVERY 8 HOURS
Qty: 0 | Refills: 0 | Status: DISCONTINUED | OUTPATIENT
Start: 2018-04-19 | End: 2018-04-24

## 2018-04-19 RX ORDER — DEXTROSE 50 % IN WATER 50 %
12.5 SYRINGE (ML) INTRAVENOUS ONCE
Qty: 0 | Refills: 0 | Status: DISCONTINUED | OUTPATIENT
Start: 2018-04-19 | End: 2018-04-25

## 2018-04-19 RX ORDER — ATORVASTATIN CALCIUM 80 MG/1
40 TABLET, FILM COATED ORAL AT BEDTIME
Qty: 0 | Refills: 0 | Status: DISCONTINUED | OUTPATIENT
Start: 2018-04-19 | End: 2018-04-25

## 2018-04-19 RX ORDER — HEPARIN SODIUM 5000 [USP'U]/ML
800 INJECTION INTRAVENOUS; SUBCUTANEOUS
Qty: 25000 | Refills: 0 | Status: DISCONTINUED | OUTPATIENT
Start: 2018-04-19 | End: 2018-04-20

## 2018-04-19 RX ORDER — DEXTROSE 50 % IN WATER 50 %
1 SYRINGE (ML) INTRAVENOUS ONCE
Qty: 0 | Refills: 0 | Status: DISCONTINUED | OUTPATIENT
Start: 2018-04-19 | End: 2018-04-25

## 2018-04-19 RX ORDER — SODIUM CHLORIDE 9 MG/ML
1000 INJECTION, SOLUTION INTRAVENOUS
Qty: 0 | Refills: 0 | Status: DISCONTINUED | OUTPATIENT
Start: 2018-04-19 | End: 2018-04-22

## 2018-04-19 RX ORDER — VANCOMYCIN HCL 1 G
1000 VIAL (EA) INTRAVENOUS EVERY 24 HOURS
Qty: 0 | Refills: 0 | Status: DISCONTINUED | OUTPATIENT
Start: 2018-04-19 | End: 2018-04-22

## 2018-04-19 RX ORDER — GLUCAGON INJECTION, SOLUTION 0.5 MG/.1ML
1 INJECTION, SOLUTION SUBCUTANEOUS ONCE
Qty: 0 | Refills: 0 | Status: DISCONTINUED | OUTPATIENT
Start: 2018-04-19 | End: 2018-04-25

## 2018-04-19 RX ORDER — HEPARIN SODIUM 5000 [USP'U]/ML
5000 INJECTION INTRAVENOUS; SUBCUTANEOUS EVERY 8 HOURS
Qty: 0 | Refills: 0 | Status: DISCONTINUED | OUTPATIENT
Start: 2018-04-19 | End: 2018-04-19

## 2018-04-19 RX ORDER — DEXTROSE 50 % IN WATER 50 %
25 SYRINGE (ML) INTRAVENOUS ONCE
Qty: 0 | Refills: 0 | Status: DISCONTINUED | OUTPATIENT
Start: 2018-04-19 | End: 2018-04-25

## 2018-04-19 RX ORDER — AMLODIPINE BESYLATE 2.5 MG/1
5 TABLET ORAL DAILY
Qty: 0 | Refills: 0 | Status: DISCONTINUED | OUTPATIENT
Start: 2018-04-19 | End: 2018-04-25

## 2018-04-19 RX ORDER — VANCOMYCIN HCL 1 G
1000 VIAL (EA) INTRAVENOUS ONCE
Qty: 0 | Refills: 0 | Status: DISCONTINUED | OUTPATIENT
Start: 2018-04-19 | End: 2018-04-19

## 2018-04-19 RX ORDER — RIVAROXABAN 15 MG-20MG
15 KIT ORAL EVERY 24 HOURS
Qty: 0 | Refills: 0 | Status: DISCONTINUED | OUTPATIENT
Start: 2018-04-19 | End: 2018-04-19

## 2018-04-19 RX ORDER — CILOSTAZOL 100 MG/1
50 TABLET ORAL
Qty: 0 | Refills: 0 | Status: DISCONTINUED | OUTPATIENT
Start: 2018-04-19 | End: 2018-04-21

## 2018-04-19 RX ORDER — PIPERACILLIN AND TAZOBACTAM 4; .5 G/20ML; G/20ML
3.38 INJECTION, POWDER, LYOPHILIZED, FOR SOLUTION INTRAVENOUS ONCE
Qty: 0 | Refills: 0 | Status: DISCONTINUED | OUTPATIENT
Start: 2018-04-19 | End: 2018-04-19

## 2018-04-19 RX ORDER — HEPARIN SODIUM 5000 [USP'U]/ML
80000 INJECTION INTRAVENOUS; SUBCUTANEOUS
Qty: 25000 | Refills: 0 | Status: DISCONTINUED | OUTPATIENT
Start: 2018-04-19 | End: 2018-04-19

## 2018-04-19 RX ORDER — DEXTROSE 50 % IN WATER 50 %
50 SYRINGE (ML) INTRAVENOUS ONCE
Qty: 0 | Refills: 0 | Status: COMPLETED | OUTPATIENT
Start: 2018-04-19 | End: 2018-04-19

## 2018-04-19 RX ORDER — LOSARTAN POTASSIUM 100 MG/1
25 TABLET, FILM COATED ORAL DAILY
Qty: 0 | Refills: 0 | Status: DISCONTINUED | OUTPATIENT
Start: 2018-04-19 | End: 2018-04-21

## 2018-04-19 RX ORDER — METOPROLOL TARTRATE 50 MG
25 TABLET ORAL EVERY 12 HOURS
Qty: 0 | Refills: 0 | Status: DISCONTINUED | OUTPATIENT
Start: 2018-04-19 | End: 2018-04-25

## 2018-04-19 RX ORDER — INSULIN HUMAN 100 [IU]/ML
10 INJECTION, SOLUTION SUBCUTANEOUS ONCE
Qty: 0 | Refills: 0 | Status: COMPLETED | OUTPATIENT
Start: 2018-04-19 | End: 2018-04-19

## 2018-04-19 RX ORDER — INSULIN LISPRO 100/ML
VIAL (ML) SUBCUTANEOUS
Qty: 0 | Refills: 0 | Status: DISCONTINUED | OUTPATIENT
Start: 2018-04-19 | End: 2018-04-22

## 2018-04-19 RX ORDER — INSULIN LISPRO 100/ML
VIAL (ML) SUBCUTANEOUS AT BEDTIME
Qty: 0 | Refills: 0 | Status: DISCONTINUED | OUTPATIENT
Start: 2018-04-19 | End: 2018-04-23

## 2018-04-19 RX ADMIN — ATORVASTATIN CALCIUM 40 MILLIGRAM(S): 80 TABLET, FILM COATED ORAL at 21:34

## 2018-04-19 RX ADMIN — Medication 50 MILLILITER(S): at 20:05

## 2018-04-19 RX ADMIN — HEPARIN SODIUM 5000 UNIT(S): 5000 INJECTION INTRAVENOUS; SUBCUTANEOUS at 21:34

## 2018-04-19 RX ADMIN — Medication 250 MILLIGRAM(S): at 23:32

## 2018-04-19 RX ADMIN — INSULIN HUMAN 10 UNIT(S): 100 INJECTION, SOLUTION SUBCUTANEOUS at 20:04

## 2018-04-19 NOTE — ED PROVIDER NOTE - MUSCULOSKELETAL, MLM
Right foot w/ weakly palpable DP pulse, warm w/ brisk cap refill. 1st and 2nd digitsd w/ areas of necrosis but no open wounds or discharge. No erythema, warmth, induration or tenderness

## 2018-04-19 NOTE — ED ADULT TRIAGE NOTE - CHIEF COMPLAINT QUOTE
Pt brought in by EMS from vascular surgery for right foot gangrene. Sent in for further evaluation for IV antibiotics and possible toe amputation.

## 2018-04-19 NOTE — CONSULT NOTE ADULT - SUBJECTIVE AND OBJECTIVE BOX
Patient is a 78y old  Female who presents with a chief complaint of     HPI: The patient is a 78y Female complaining of wound check.    POD surg consult: 78 year old female recently discharged on 4/14 from [place] for same c/c of RF great toe and 2nd digit discoloration. Pt was discharged from [place] on Augmentin for 10 days. No pod surg intervention at that point. Pt was being followed by [facility] surg as well, but pt refused in house angio and requested to be discharged to discuss with outpt vascular surgeon and possible angio. Pt denies any n/v/f/d/sob.       PAST MEDICAL & SURGICAL HISTORY:  MI (myocardial infarction)  CAD (coronary artery disease)  Hypertension  Diabetes  Hyperlipemia  Diabetes type 2, uncontrolled  CAD (coronary artery disease)  H/O: hysterectomy  S/P coronary artery stent placement  S/P CABG x 3    MEDICATIONS  (STANDING):    MEDICATIONS  (PRN):    Allergies    No Known Allergies    Intolerances    VITALS:    Vital Signs Last 24 Hrs  T(C): 36.9 (19 Apr 2018 15:10), Max: 36.9 (19 Apr 2018 15:10)  T(F): 98.5 (19 Apr 2018 15:10), Max: 98.5 (19 Apr 2018 15:10)  HR: 80 (19 Apr 2018 16:00) (80 - 85)  BP: 153/85 (19 Apr 2018 16:00) (153/85 - 153/90)  BP(mean): --  RR: 18 (19 Apr 2018 16:00) (18 - 18)  SpO2: 100% (19 Apr 2018 16:00) (100% - 100%)    LABS:                          11.6   7.13  )-----------( 270      ( 19 Apr 2018 17:09 )             38.2     CAPILLARY BLOOD GLUCOSE    LOWER EXTREMITY PHYSICAL EXAM:    Vasular: DP/PT 1/4, B/L, CFT <5 seconds B/L, Temperature gradient warm to warm, B/L.   Neuro: Epicritic sensation diminished to the level of midfoot, B/L.  Musculoskeletal/Ortho: No pain with manipulation of the 1st and 2nd digit. No pain with ROM at the 1st MPJ or 2nd MPJ.     Skin:  Wound #1: RIGHT foot Hallux   Eschar located at the distal end of the digit with associated subungal hemotoma. Eschar is well adhered with no bogginess, fluctuance, erythema, edema, drainage, or obvious necrosis of skin, soft tissue or bone. Distinct discoloration of the foot as compared to contralateral limb. no petechiae noted.     Wound #2: RIGHT foot 2nd digit  Eschar located at the distal end of the digit. Eschar is well adhered with no bogginess, fluctuance, erythema, edema, drainage, or obvious necrosis of skin, soft tissue or bone. Distinct discoloration of the foot as compared to contralateral limb. no petechiae noted.     RADIOLOGY & ADDITIONAL STUDIES:  (PENDING)

## 2018-04-19 NOTE — H&P ADULT - NSHPLABSRESULTS_GEN_ALL_CORE
LABS:                        11.6   7.13  )-----------( 270      ( 19 Apr 2018 17:09 )             38.2     19 Apr 2018 18:42    x      |  x      |  x      ----------------------------<  x      6.6     |  x      |  x        Ca    9.6        19 Apr 2018 17:09  Phos  2.6       19 Apr 2018 17:09  Mg     2.3       19 Apr 2018 17:09    TPro  8.7    /  Alb  4.0    /  TBili  0.7    /  DBili  x      /  AST  44     /  ALT  29     /  AlkPhos  101    19 Apr 2018 17:09    PT/INR - ( 19 Apr 2018 18:42 )   PT: 11.7 SEC;   INR: 1.05          PTT - ( 19 Apr 2018 18:42 )  PTT:33.3 SEC

## 2018-04-19 NOTE — ED PROVIDER NOTE - ATTENDING CONTRIBUTION TO CARE
I performed a face-to-face evaluation of the patient and performed a history and physical examination. I agree with the history and physical examination.    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 No F or discharge. R great to w/ subungual hematoma. Not red/hot/tender/pus/maceration. Is warm. Felt DP pulse. Will consult Vasc Surg. Labs.

## 2018-04-19 NOTE — H&P ADULT - NSHPPHYSICALEXAM_GEN_ALL_CORE
General: alert and oriented to person and place but meanders when asked direct quesitons  Resp: airway patent, respirations unlabored  CVS: regular rate and rhythm  Abdomen: soft, nontender, nondistended  Extremities: no edema  Skin: warm, dry, appropriate color, dry gangrene with betadine over right 1st and 2nd toes, AT and popliteal signals bilaterally, palpable femoral pulses bilaterally

## 2018-04-19 NOTE — ED PROVIDER NOTE - PMH
CAD (coronary artery disease)    CAD (coronary artery disease)    Diabetes    Diabetes type 2, uncontrolled    Hyperlipemia    Hypertension    MI (myocardial infarction)

## 2018-04-19 NOTE — H&P ADULT - HISTORY OF PRESENT ILLNESS
78 year old woman with PMH DM, HTN, MI, CAD s/p stent presents after being referred from her Vascular Surgeon for RLE 1st and 2nd toe gangrene. She complains of pain in her foot that has been ongoing for several years. She describes being seen by her podiatrist Dr. Ames for several years. She states she was recently admitted to [facility] following a fall. She does not report any further complaints regarding her foot. On further questioning, she perseverates on being discharged before she was ready from [facility].  She was recently admitted to [place] where she was seen by Dr. Holland and recommended for an angiogram. 78 year old woman with PMH DM, HTN, MI, CAD s/p stent presents after being referred from her Vascular Surgeon for RLE 1st and 2nd toe gangrene. She complains of pain in her foot that has been ongoing for several years. She started walking with a cane about one year ago. She describes being seen by her podiatrist Dr. Ames for several years. She states she was recently admitted to [facility] following a fall. She does not report any further complaints regarding her foot. On further questioning, she perseverates on being discharged before she was ready from [facility].  She was recently admitted to [place] where she was seen by Dr. Holland and recommended for an angiogram.

## 2018-04-19 NOTE — CONSULT NOTE ADULT - ASSESSMENT
78 year old female RIGHT foot hallux and 2nd digit discoloration, non-infected  - Pt was examined and evaluated w/ ED attending   - VSS.  No obvious necrosis of the digits. Foot warm, sensation intact, pt able to ROM.   - No emergent pod surg intervention at this time  - Wound dressed with betadine and LIGHT DSD  - Rec daily dressing changes with Betadine and DSD  - Rec proper follow-up with pod surg outpt  - Rec Vasc c/s while in ED for proper follow-up   - Rec pt to follow-up with Dr. Pedro in 2-4 days @ [phone]

## 2018-04-19 NOTE — ED PROVIDER NOTE - MEDICAL DECISION MAKING DETAILS
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 No F or discharge. R great to w/ subungual hematoma. Not red/hot/tender/pus/maceration. Is warm. Felt DP pulse. Will consult Vasc Surg. Labs.

## 2018-04-19 NOTE — ED PROVIDER NOTE - OBJECTIVE STATEMENT
78yof w/ HTN, HLD, DM2, CAD sent in for right toe gangrene. Was admitted to [facility] for the same last week, treated w/ IV abx and vascular surg was considering surgical intervention but pt wanted to discuss w/ her primary doctors. Now considering agreeing to intervention. Denies any pain to the foot but has minimal sensation at baseline. No fevers, chills, or other systemic symptoms.

## 2018-04-19 NOTE — H&P ADULT - ASSESSMENT
78 year old woman with R 1st and 2nd toe gangrene, likely months to years duration with PAD    Plan:  Admit to Surgery, Dr. Nation  treat hyperkalemia (although specimen hemolyzed)  repeat labs  vanc and zosyn for osteomyelitis  f/u podiatry  RAMIRO/PVRs    - d/w Dr. Nation    - KARI Pina, PGY2  # [phone]

## 2018-04-20 LAB
APTT BLD: 143 SEC — CRITICAL HIGH (ref 27.5–37.4)
APTT BLD: 169.4 SEC — CRITICAL HIGH (ref 27.5–37.4)
APTT BLD: 38.8 SEC — HIGH (ref 27.5–37.4)
BUN SERPL-MCNC: 23 MG/DL — SIGNIFICANT CHANGE UP (ref 7–23)
CALCIUM SERPL-MCNC: 9.1 MG/DL — SIGNIFICANT CHANGE UP (ref 8.4–10.5)
CHLORIDE SERPL-SCNC: 95 MMOL/L — LOW (ref 98–107)
CO2 SERPL-SCNC: 23 MMOL/L — SIGNIFICANT CHANGE UP (ref 22–31)
CREAT SERPL-MCNC: 1.19 MG/DL — SIGNIFICANT CHANGE UP (ref 0.5–1.3)
GLUCOSE SERPL-MCNC: 271 MG/DL — HIGH (ref 70–99)
HCT VFR BLD CALC: 32.9 % — LOW (ref 34.5–45)
HCT VFR BLD CALC: 35.5 % — SIGNIFICANT CHANGE UP (ref 34.5–45)
HGB BLD-MCNC: 10.3 G/DL — LOW (ref 11.5–15.5)
HGB BLD-MCNC: 11 G/DL — LOW (ref 11.5–15.5)
MAGNESIUM SERPL-MCNC: 2 MG/DL — SIGNIFICANT CHANGE UP (ref 1.6–2.6)
MCHC RBC-ENTMCNC: 24.6 PG — LOW (ref 27–34)
MCHC RBC-ENTMCNC: 24.7 PG — LOW (ref 27–34)
MCHC RBC-ENTMCNC: 31 % — LOW (ref 32–36)
MCHC RBC-ENTMCNC: 31.3 % — LOW (ref 32–36)
MCV RBC AUTO: 78.9 FL — LOW (ref 80–100)
MCV RBC AUTO: 79.2 FL — LOW (ref 80–100)
NRBC # FLD: 0 — SIGNIFICANT CHANGE UP
NRBC # FLD: 0 — SIGNIFICANT CHANGE UP
PHOSPHATE SERPL-MCNC: 2.6 MG/DL — SIGNIFICANT CHANGE UP (ref 2.5–4.5)
PLATELET # BLD AUTO: 250 K/UL — SIGNIFICANT CHANGE UP (ref 150–400)
PLATELET # BLD AUTO: 251 K/UL — SIGNIFICANT CHANGE UP (ref 150–400)
PMV BLD: 10.4 FL — SIGNIFICANT CHANGE UP (ref 7–13)
PMV BLD: 11.5 FL — SIGNIFICANT CHANGE UP (ref 7–13)
POTASSIUM SERPL-MCNC: 4.2 MMOL/L — SIGNIFICANT CHANGE UP (ref 3.5–5.3)
POTASSIUM SERPL-SCNC: 4.2 MMOL/L — SIGNIFICANT CHANGE UP (ref 3.5–5.3)
RBC # BLD: 4.17 M/UL — SIGNIFICANT CHANGE UP (ref 3.8–5.2)
RBC # BLD: 4.48 M/UL — SIGNIFICANT CHANGE UP (ref 3.8–5.2)
RBC # FLD: 16.2 % — HIGH (ref 10.3–14.5)
RBC # FLD: 16.5 % — HIGH (ref 10.3–14.5)
SODIUM SERPL-SCNC: 132 MMOL/L — LOW (ref 135–145)
WBC # BLD: 5.43 K/UL — SIGNIFICANT CHANGE UP (ref 3.8–10.5)
WBC # BLD: 6.12 K/UL — SIGNIFICANT CHANGE UP (ref 3.8–10.5)
WBC # FLD AUTO: 5.43 K/UL — SIGNIFICANT CHANGE UP (ref 3.8–10.5)
WBC # FLD AUTO: 6.12 K/UL — SIGNIFICANT CHANGE UP (ref 3.8–10.5)

## 2018-04-20 PROCEDURE — 93923 UPR/LXTR ART STDY 3+ LVLS: CPT | Mod: 26

## 2018-04-20 PROCEDURE — 99231 SBSQ HOSP IP/OBS SF/LOW 25: CPT

## 2018-04-20 PROCEDURE — 99223 1ST HOSP IP/OBS HIGH 75: CPT

## 2018-04-20 PROCEDURE — 93010 ELECTROCARDIOGRAM REPORT: CPT

## 2018-04-20 RX ORDER — HEPARIN SODIUM 5000 [USP'U]/ML
1200 INJECTION INTRAVENOUS; SUBCUTANEOUS
Qty: 25000 | Refills: 0 | Status: DISCONTINUED | OUTPATIENT
Start: 2018-04-20 | End: 2018-04-24

## 2018-04-20 RX ORDER — INSULIN GLARGINE 100 [IU]/ML
20 INJECTION, SOLUTION SUBCUTANEOUS AT BEDTIME
Qty: 0 | Refills: 0 | Status: DISCONTINUED | OUTPATIENT
Start: 2018-04-20 | End: 2018-04-21

## 2018-04-20 RX ORDER — ASPIRIN/CALCIUM CARB/MAGNESIUM 324 MG
81 TABLET ORAL DAILY
Qty: 0 | Refills: 0 | Status: DISCONTINUED | OUTPATIENT
Start: 2018-04-20 | End: 2018-04-25

## 2018-04-20 RX ADMIN — HEPARIN SODIUM 1200 UNIT(S)/HR: 5000 INJECTION INTRAVENOUS; SUBCUTANEOUS at 07:59

## 2018-04-20 RX ADMIN — Medication 2: at 22:17

## 2018-04-20 RX ADMIN — Medication 25 MILLIGRAM(S): at 05:25

## 2018-04-20 RX ADMIN — Medication 2: at 18:28

## 2018-04-20 RX ADMIN — CILOSTAZOL 50 MILLIGRAM(S): 100 TABLET ORAL at 17:10

## 2018-04-20 RX ADMIN — ATORVASTATIN CALCIUM 40 MILLIGRAM(S): 80 TABLET, FILM COATED ORAL at 22:17

## 2018-04-20 RX ADMIN — INSULIN GLARGINE 20 UNIT(S): 100 INJECTION, SOLUTION SUBCUTANEOUS at 22:17

## 2018-04-20 RX ADMIN — PIPERACILLIN AND TAZOBACTAM 25 GRAM(S): 4; .5 INJECTION, POWDER, LYOPHILIZED, FOR SOLUTION INTRAVENOUS at 17:10

## 2018-04-20 RX ADMIN — Medication 25 MILLIGRAM(S): at 17:19

## 2018-04-20 RX ADMIN — LOSARTAN POTASSIUM 25 MILLIGRAM(S): 100 TABLET, FILM COATED ORAL at 05:25

## 2018-04-20 RX ADMIN — Medication 2: at 09:20

## 2018-04-20 RX ADMIN — HEPARIN SODIUM 900 UNIT(S)/HR: 5000 INJECTION INTRAVENOUS; SUBCUTANEOUS at 16:17

## 2018-04-20 RX ADMIN — HEPARIN SODIUM 0 UNIT(S)/HR: 5000 INJECTION INTRAVENOUS; SUBCUTANEOUS at 15:12

## 2018-04-20 RX ADMIN — Medication 81 MILLIGRAM(S): at 13:12

## 2018-04-20 RX ADMIN — Medication 3: at 13:11

## 2018-04-20 RX ADMIN — AMLODIPINE BESYLATE 5 MILLIGRAM(S): 2.5 TABLET ORAL at 05:25

## 2018-04-20 RX ADMIN — Medication 250 MILLIGRAM(S): at 23:13

## 2018-04-20 RX ADMIN — PIPERACILLIN AND TAZOBACTAM 25 GRAM(S): 4; .5 INJECTION, POWDER, LYOPHILIZED, FOR SOLUTION INTRAVENOUS at 02:17

## 2018-04-20 RX ADMIN — CILOSTAZOL 50 MILLIGRAM(S): 100 TABLET ORAL at 05:26

## 2018-04-20 RX ADMIN — HEPARIN SODIUM 8 UNIT(S)/HR: 5000 INJECTION INTRAVENOUS; SUBCUTANEOUS at 00:55

## 2018-04-20 RX ADMIN — HEPARIN SODIUM 0 UNIT(S)/HR: 5000 INJECTION INTRAVENOUS; SUBCUTANEOUS at 23:20

## 2018-04-20 RX ADMIN — PIPERACILLIN AND TAZOBACTAM 25 GRAM(S): 4; .5 INJECTION, POWDER, LYOPHILIZED, FOR SOLUTION INTRAVENOUS at 09:20

## 2018-04-20 NOTE — PROVIDER CONTACT NOTE (CRITICAL VALUE NOTIFICATION) - ACTION/TREATMENT ORDERED:
follow as per heparin nomogram
MD Smith notiified, will follow nomogram instructions. WIll continue monitoring.

## 2018-04-20 NOTE — PROGRESS NOTE ADULT - ASSESSMENT
78 year old female RIGHT foot hallux and 2nd digit discoloration, non-infected  - Pt was examined and evaluated  - Continues to be stable at this time, vascular w/u in progress   - XR read as possible OM, does not correlate clinically & spoke with radiology has not changed significantly from prior XR image from 1 week ago  - Will follow up on [facility] recs  - Plan for local wound care only at this time, no podiatric intervention anticipated will order MRI to r/o OM due to inconclusive XR read  - d/w attending

## 2018-04-20 NOTE — CONSULT NOTE ADULT - SUBJECTIVE AND OBJECTIVE BOX
CHIEF COMPLAINT:Patient is a 78y old  Female who presents with a chief complaint of referred by her Vascular Surgeon for gangrene of right toes (19 Apr 2018 19:20)      HISTORY OF PRESENT ILLNESS:  This is a pleasant woman with history as below presented with   right foot ulcer potentially needs surgery  No chest pain, dyspnea, palpitation, or dizziness.   ET is less than 4 METs     PAST MEDICAL & SURGICAL HISTORY:  MI (myocardial infarction)  CAD (coronary artery disease)  Hypertension  Diabetes  Hyperlipemia  Diabetes type 2, uncontrolled  CAD (coronary artery disease)  H/O: hysterectomy  S/P coronary artery stent placement  S/P CABG x 3          MEDICATIONS:  amLODIPine   Tablet 5 milliGRAM(s) Oral daily  aspirin  chewable 81 milliGRAM(s) Oral daily  cilostazol 50 milliGRAM(s) Oral two times a day  heparin  Infusion. 1200 Unit(s)/Hr IV Continuous <Continuous>  losartan 25 milliGRAM(s) Oral daily  metoprolol tartrate 25 milliGRAM(s) Oral every 12 hours    piperacillin/tazobactam IVPB. 3.375 Gram(s) IV Intermittent every 8 hours  vancomycin  IVPB 1000 milliGRAM(s) IV Intermittent every 24 hours          atorvastatin 40 milliGRAM(s) Oral at bedtime  dextrose 50% Injectable 12.5 Gram(s) IV Push once  dextrose 50% Injectable 25 Gram(s) IV Push once  dextrose 50% Injectable 25 Gram(s) IV Push once  dextrose Gel 1 Dose(s) Oral once PRN  glucagon  Injectable 1 milliGRAM(s) IntraMuscular once PRN  insulin lispro (HumaLOG) corrective regimen sliding scale   SubCutaneous three times a day before meals  insulin lispro (HumaLOG) corrective regimen sliding scale   SubCutaneous at bedtime    dextrose 5%. 1000 milliLiter(s) IV Continuous <Continuous>      FAMILY HISTORY:  No pertinent family history in first degree relatives  No pertinent family history in first degree relatives      Non-contributory    SOCIAL HISTORY:    No tobacco, drugs or etoh    Allergies    No Known Allergies    Intolerances    	    REVIEW OF SYSTEMS:  as above  The rest of the 14 points ROS reviewed and except above they are unremarkable.        PHYSICAL EXAM:  T(C): 36.4 (04-20-18 @ 10:12), Max: 36.9 (04-19-18 @ 15:10)  HR: 88 (04-20-18 @ 10:12) (67 - 88)  BP: 121/62 (04-20-18 @ 10:12) (121/62 - 154/81)  RR: 18 (04-20-18 @ 10:12) (17 - 18)  SpO2: 99% (04-20-18 @ 10:12) (96% - 100%)  Wt(kg): --  I&O's Summary    19 Apr 2018 07:01  -  20 Apr 2018 07:00  --------------------------------------------------------  IN: 406 mL / OUT: 1350 mL / NET: -944 mL        JVP: Normal  Neck: supple  Lung: clear   CV: S1 S2 , Murmur:  Abd: soft  Ext: No edema  neuro: Awake / alert  Psych: flat affect  Skin: normal     LABS/DATA:    TELEMETRY: 	    ECG:  	   	  CARDIAC MARKERS:                                  10.3   6.12  )-----------( 250      ( 20 Apr 2018 13:57 )             32.9     04-20    132<L>  |  95<L>  |  23  ----------------------------<  271<H>  4.2   |  23  |  1.19    Ca    9.1      20 Apr 2018 05:30  Phos  2.6     04-20  Mg     2.0     04-20    TPro  8.7<H>  /  Alb  4.0  /  TBili  0.7  /  DBili  x   /  AST  44<H>  /  ALT  29  /  AlkPhos  101  04-19    proBNP:   Lipid Profile:   HgA1c:   TSH:       EKG sinus, non specific st wave abn

## 2018-04-20 NOTE — PROGRESS NOTE ADULT - ASSESSMENT
78 year old lady with R 1st and 2nd toe gangrene, likely 2/2 PAD:  - Tolerating diet  - Pain control  - F/u labs  - Cont IV abx  - F/u RAMIRO/PVR  - Pods reccs appreciated   - OOB

## 2018-04-20 NOTE — CONSULT NOTE ADULT - SUBJECTIVE AND OBJECTIVE BOX
CHIEF COMPLAINT: Patient is a 78y old  Female who presents with a chief complaint of referred by her Vascular Surgeon for gangrene of right toes (19 Apr 2018 19:20)      HPI:           Allergies    No Known Allergies      HOME MEDICATIONS: [x] Reviewed    MEDICATIONS  (STANDING):  amLODIPine   Tablet 5 milliGRAM(s) Oral daily  aspirin  chewable 81 milliGRAM(s) Oral daily  atorvastatin 40 milliGRAM(s) Oral at bedtime  cilostazol 50 milliGRAM(s) Oral two times a day  dextrose 5%. 1000 milliLiter(s) (50 mL/Hr) IV Continuous <Continuous>  dextrose 50% Injectable 12.5 Gram(s) IV Push once  dextrose 50% Injectable 25 Gram(s) IV Push once  dextrose 50% Injectable 25 Gram(s) IV Push once  heparin  Infusion. 1200 Unit(s)/Hr (12 mL/Hr) IV Continuous <Continuous>  insulin lispro (HumaLOG) corrective regimen sliding scale   SubCutaneous three times a day before meals  insulin lispro (HumaLOG) corrective regimen sliding scale   SubCutaneous at bedtime  losartan 25 milliGRAM(s) Oral daily  metoprolol tartrate 25 milliGRAM(s) Oral every 12 hours  piperacillin/tazobactam IVPB. 3.375 Gram(s) IV Intermittent every 8 hours  vancomycin  IVPB 1000 milliGRAM(s) IV Intermittent every 24 hours    MEDICATIONS  (PRN):  dextrose Gel 1 Dose(s) Oral once PRN Blood Glucose LESS THAN 70 milliGRAM(s)/deciliter  glucagon  Injectable 1 milliGRAM(s) IntraMuscular once PRN Glucose LESS THAN 70 milligrams/deciliter      PAST MEDICAL & SURGICAL HISTORY:  MI (myocardial infarction)  CAD (coronary artery disease)  Hypertension  Diabetes  Hyperlipemia  Diabetes type 2, uncontrolled  CAD (coronary artery disease)  H/O: hysterectomy  S/P coronary artery stent placement  S/P CABG x 3  [x ] Reviewed     SOCIAL HISTORY:  no smoking, alcohol, illicit drug use     FAMILY HISTORY:  No pertinent family history in first degree relatives  No pertinent family history in first degree relatives  [x] No pertinent family history in first degree relatives     REVIEW OF SYSTEMS:  [x] All other ROS negative  [  ] Unable to obtain due to poor mental status    Vital Signs Last 24 Hrs  T(C): 36.4 (20 Apr 2018 10:12), Max: 36.9 (19 Apr 2018 21:19)  T(F): 97.5 (20 Apr 2018 10:12), Max: 98.4 (19 Apr 2018 21:19)  HR: 88 (20 Apr 2018 10:12) (67 - 88)  BP: 121/62 (20 Apr 2018 10:12) (121/62 - 154/81)  BP(mean): --  RR: 18 (20 Apr 2018 10:12) (17 - 18)  SpO2: 99% (20 Apr 2018 10:12) (96% - 100%)    PHYSICAL EXAM:  GENERAL: NAD, well-groomed, well-developed  HEAD:  Atraumatic, Normocephalic  EYES: EOMI, PERRLA, conjunctiva and sclera clear  ENMT: Moist mucous membranes  NECK: Supple, No JVD  RESPIRATORY: Clear to auscultation bilaterally; No rales, rhonchi, wheezing, or rubs  CARDIOVASCULAR: irregular; No murmurs, rubs, or gallops  GASTROINTESTINAL: Soft, Nontender, Nondistended; Bowel sounds present  GENITOURINARY: Not examined  EXTREMITIES:  2+ Peripheral Pulses, No clubbing, cyanosis, or edema  NERVOUS SYSTEM:  Alert & Oriented X3; Moving all 4 extremities; No gross sensory deficits  HEME/LYMPH: No lymphadenopathy noted  SKIN: No rashes or lesions; Incisions C/D/I    LABS:                        10.3   6.12  )-----------( 250      ( 20 Apr 2018 13:57 )             32.9     Hemoglobin: 10.3 g/dL (04-20 @ 13:57)  Hemoglobin: 11.0 g/dL (04-20 @ 05:30)  Hemoglobin: 11.6 g/dL (04-19 @ 17:09)  Hemoglobin: 11.0 g/dL (04-16 @ 07:51)    04-20    132<L>  |  95<L>  |  23  ----------------------------<  271<H>  4.2   |  23  |  1.19    Ca    9.1      20 Apr 2018 05:30  Phos  2.6     04-20  Mg     2.0     04-20    TPro  8.7<H>  /  Alb  4.0  /  TBili  0.7  /  DBili  x   /  AST  44<H>  /  ALT  29  /  AlkPhos  101  04-19    PT/INR - ( 19 Apr 2018 18:42 )   PT: 11.7 SEC;   INR: 1.05          PTT - ( 20 Apr 2018 13:57 )  PTT:169.4 SEC    Microbiology     RADIOLOGY & ADDITIONAL STUDIES:    EKG:     none in chart, ordered     Imaging:     < from: CT Angio Abd Aorta w/run-off w/ IV Cont (04.19.18 @ 21:09) >    ******PRELIMINARY REPORT******    ******PRELIMINARY REPORT******            EXAM:  CT ANGIO ABD AOR W RUN(W)AW IC        PROCEDURE DATE:  Apr 19 2018     ******PRELIMINARY REPORT******    ******PRELIMINARY REPORT******            INTERPRETATION:  Three vessel runoff to the distal right lower extremity.    Patent left AT and peroneal arteries, with attenuated PT but distal   reconstitution of the plantar arteries.    Follow up official report.            ******PRELIMINARY REPORT******    ******PRELIMINARY REPORT******          AMY ALVAREZ M.D., RADIOLOGY RESIDENT    < end of copied text > CHIEF COMPLAINT: Patient is a 78y old  Female who presents with a chief complaint of referred by her Vascular Surgeon for gangrene of right toes (19 Apr 2018 19:20)      HPI:     77 yo F w/ IDDM, HTN, CAD s/p stents and ?CABG presents w/ R 1st and 2nd toe gangrene, surgical plan TBD.   Pt is a poor historian, and states that her cardiologist/PMD is Dr. Nolvia Lunsford.   She states that she is also taking Xarelto "for the heart." Unclear if she was ever told she has an irregular heart rate.   Pt denies any cp, palpitations, sob, dizziness.        Allergies    No Known Allergies      HOME MEDICATIONS: [x] Reviewed    MEDICATIONS  (STANDING):  amLODIPine   Tablet 5 milliGRAM(s) Oral daily  aspirin  chewable 81 milliGRAM(s) Oral daily  atorvastatin 40 milliGRAM(s) Oral at bedtime  cilostazol 50 milliGRAM(s) Oral two times a day  dextrose 5%. 1000 milliLiter(s) (50 mL/Hr) IV Continuous <Continuous>  dextrose 50% Injectable 12.5 Gram(s) IV Push once  dextrose 50% Injectable 25 Gram(s) IV Push once  dextrose 50% Injectable 25 Gram(s) IV Push once  heparin  Infusion. 1200 Unit(s)/Hr (12 mL/Hr) IV Continuous <Continuous>  insulin lispro (HumaLOG) corrective regimen sliding scale   SubCutaneous three times a day before meals  insulin lispro (HumaLOG) corrective regimen sliding scale   SubCutaneous at bedtime  losartan 25 milliGRAM(s) Oral daily  metoprolol tartrate 25 milliGRAM(s) Oral every 12 hours  piperacillin/tazobactam IVPB. 3.375 Gram(s) IV Intermittent every 8 hours  vancomycin  IVPB 1000 milliGRAM(s) IV Intermittent every 24 hours    MEDICATIONS  (PRN):  dextrose Gel 1 Dose(s) Oral once PRN Blood Glucose LESS THAN 70 milliGRAM(s)/deciliter  glucagon  Injectable 1 milliGRAM(s) IntraMuscular once PRN Glucose LESS THAN 70 milligrams/deciliter      PAST MEDICAL & SURGICAL HISTORY:  MI (myocardial infarction)  CAD (coronary artery disease)  Hypertension  Diabetes  Hyperlipemia  Diabetes type 2, uncontrolled  CAD (coronary artery disease)  H/O: hysterectomy  S/P coronary artery stent placement  S/P CABG x 3  [x ] Reviewed     SOCIAL HISTORY:  no smoking, alcohol, illicit drug use     FAMILY HISTORY:  No pertinent family history in first degree relatives  No pertinent family history in first degree relatives  [x] No pertinent family history in first degree relatives     REVIEW OF SYSTEMS:  [x] All other ROS negative  [  ] Unable to obtain due to poor mental status    Vital Signs Last 24 Hrs  T(C): 36.4 (20 Apr 2018 10:12), Max: 36.9 (19 Apr 2018 21:19)  T(F): 97.5 (20 Apr 2018 10:12), Max: 98.4 (19 Apr 2018 21:19)  HR: 88 (20 Apr 2018 10:12) (67 - 88)  BP: 121/62 (20 Apr 2018 10:12) (121/62 - 154/81)  BP(mean): --  RR: 18 (20 Apr 2018 10:12) (17 - 18)  SpO2: 99% (20 Apr 2018 10:12) (96% - 100%)    PHYSICAL EXAM:  GENERAL: NAD, well-groomed, well-developed  HEAD:  Atraumatic, Normocephalic  EYES: EOMI, PERRLA, conjunctiva and sclera clear  ENMT: Moist mucous membranes  NECK: Supple, No JVD  RESPIRATORY: Clear to auscultation bilaterally; No rales, rhonchi, wheezing, or rubs  CARDIOVASCULAR: irregular; No murmurs, rubs, or gallops  GASTROINTESTINAL: Soft, Nontender, Nondistended; Bowel sounds present  GENITOURINARY: Not examined  EXTREMITIES:  2+ Peripheral Pulses, No clubbing, cyanosis, or edema  NERVOUS SYSTEM:  Alert & Oriented X3; Moving all 4 extremities; No gross sensory deficits  HEME/LYMPH: No lymphadenopathy noted  SKIN: No rashes or lesions; Incisions C/D/I    LABS:                        10.3   6.12  )-----------( 250      ( 20 Apr 2018 13:57 )             32.9     Hemoglobin: 10.3 g/dL (04-20 @ 13:57)  Hemoglobin: 11.0 g/dL (04-20 @ 05:30)  Hemoglobin: 11.6 g/dL (04-19 @ 17:09)  Hemoglobin: 11.0 g/dL (04-16 @ 07:51)    04-20    132<L>  |  95<L>  |  23  ----------------------------<  271<H>  4.2   |  23  |  1.19    Ca    9.1      20 Apr 2018 05:30  Phos  2.6     04-20  Mg     2.0     04-20    TPro  8.7<H>  /  Alb  4.0  /  TBili  0.7  /  DBili  x   /  AST  44<H>  /  ALT  29  /  AlkPhos  101  04-19    PT/INR - ( 19 Apr 2018 18:42 )   PT: 11.7 SEC;   INR: 1.05          PTT - ( 20 Apr 2018 13:57 )  PTT:169.4 SEC    Microbiology     RADIOLOGY & ADDITIONAL STUDIES:    EKG:     none in chart, ordered     Imaging:     < from: CT Angio Abd Aorta w/run-off w/ IV Cont (04.19.18 @ 21:09) >    ******PRELIMINARY REPORT******    ******PRELIMINARY REPORT******            EXAM:  CT ANGIO ABD AOR W RUN(W)AW IC        PROCEDURE DATE:  Apr 19 2018     ******PRELIMINARY REPORT******    ******PRELIMINARY REPORT******            INTERPRETATION:  Three vessel runoff to the distal right lower extremity.    Patent left AT and peroneal arteries, with attenuated PT but distal   reconstitution of the plantar arteries.    Follow up official report.            ******PRELIMINARY REPORT******    ******PRELIMINARY REPORT******          AMY ALVAREZ M.D., RADIOLOGY RESIDENT    < end of copied text >

## 2018-04-20 NOTE — CONSULT NOTE ADULT - ASSESSMENT
Cad history of cabg and stent  cont asa  cont statin  will get stress test    HTN  stable   cont current meds    DM  Monitor finger stick. Insulin coverage. Diabetic education and Diabetic diet. Consider nutrition consultation.     Preop  Based on current ACC/AHA guidelines, patient history and physical exam, the patient is considered to have high risk  may proceed for podiatry surgery under local  will get echo and stress test for possible higher risk surgery

## 2018-04-20 NOTE — CONSULT NOTE ADULT - ASSESSMENT
79 yo F w/ IDDM, HTN, CAD s/p stents and CABG presents w/ R 1st and 2nd toe gangrene    #R 1st &2nd toe gangrene   - f/u CTA official read, RAMIRO/PVRs, MRI foot, surgical plan TBD  - on ASA/Pletal  - on heparin gttt    #CAD s/p stents/CABG   - Cardiology following, 2D echo and stress test ordered  - c/w ASA, statin    #HTN   - c/w norvasc, losartan, metoprolol  - bp controlled    #IDDM  - pt reports she takes Levemir 70 u Qam and 56 u qPM at home   - FS in 200's on just ISS currently  - will start low dose Lantus QHS and titrate up accordingly  - check Hga1c    #DVT ppx  - already on systemic a/c w/ hep gtt 79 yo F w/ IDDM, HTN, CAD s/p stents and CABG presents w/ R 1st and 2nd toe gangrene    #R 1st &2nd toe gangrene   - f/u CTA official read, RAMIRO/PVRs, MRI foot, surgical plan TBD  - on ASA/Pletal  - on heparin gttt    #CAD s/p stents/CABG   - Cardiology following, 2D echo and stress test ordered  - c/w ASA, statin    #HTN   - c/w norvasc, losartan, metoprolol  - bp controlled    #IDDM  - pt reports she takes Levemir 70 u Qam and 56 u qPM at home   - FS in 200's on just ISS currently  - will start low dose Lantus QHS and titrate up accordingly  - Hga1c 7.6 earlier this month, well controlled     #DVT ppx  - already on systemic a/c w/ hep gtt

## 2018-04-21 LAB
APTT BLD: 65 SEC — HIGH (ref 27.5–37.4)
APTT BLD: 69 SEC — HIGH (ref 27.5–37.4)
BUN SERPL-MCNC: 30 MG/DL — HIGH (ref 7–23)
CALCIUM SERPL-MCNC: 8.6 MG/DL — SIGNIFICANT CHANGE UP (ref 8.4–10.5)
CHLORIDE SERPL-SCNC: 98 MMOL/L — SIGNIFICANT CHANGE UP (ref 98–107)
CO2 SERPL-SCNC: 23 MMOL/L — SIGNIFICANT CHANGE UP (ref 22–31)
CREAT SERPL-MCNC: 1.83 MG/DL — HIGH (ref 0.5–1.3)
GLUCOSE SERPL-MCNC: 234 MG/DL — HIGH (ref 70–99)
HCT VFR BLD CALC: 31.7 % — LOW (ref 34.5–45)
HGB BLD-MCNC: 10 G/DL — LOW (ref 11.5–15.5)
MAGNESIUM SERPL-MCNC: 2.1 MG/DL — SIGNIFICANT CHANGE UP (ref 1.6–2.6)
MCHC RBC-ENTMCNC: 24.7 PG — LOW (ref 27–34)
MCHC RBC-ENTMCNC: 31.5 % — LOW (ref 32–36)
MCV RBC AUTO: 78.3 FL — LOW (ref 80–100)
NRBC # FLD: 0 — SIGNIFICANT CHANGE UP
PHOSPHATE SERPL-MCNC: 2.9 MG/DL — SIGNIFICANT CHANGE UP (ref 2.5–4.5)
PLATELET # BLD AUTO: 243 K/UL — SIGNIFICANT CHANGE UP (ref 150–400)
PMV BLD: 10.7 FL — SIGNIFICANT CHANGE UP (ref 7–13)
POTASSIUM SERPL-MCNC: 4.3 MMOL/L — SIGNIFICANT CHANGE UP (ref 3.5–5.3)
POTASSIUM SERPL-SCNC: 4.3 MMOL/L — SIGNIFICANT CHANGE UP (ref 3.5–5.3)
RBC # BLD: 4.05 M/UL — SIGNIFICANT CHANGE UP (ref 3.8–5.2)
RBC # FLD: 16.1 % — HIGH (ref 10.3–14.5)
SODIUM SERPL-SCNC: 134 MMOL/L — LOW (ref 135–145)
VANCOMYCIN TROUGH SERPL-MCNC: 9.7 UG/ML — LOW (ref 10–20)
WBC # BLD: 5.01 K/UL — SIGNIFICANT CHANGE UP (ref 3.8–10.5)
WBC # FLD AUTO: 5.01 K/UL — SIGNIFICANT CHANGE UP (ref 3.8–10.5)

## 2018-04-21 PROCEDURE — 99233 SBSQ HOSP IP/OBS HIGH 50: CPT

## 2018-04-21 RX ORDER — INSULIN GLARGINE 100 [IU]/ML
30 INJECTION, SOLUTION SUBCUTANEOUS AT BEDTIME
Qty: 0 | Refills: 0 | Status: DISCONTINUED | OUTPATIENT
Start: 2018-04-21 | End: 2018-04-22

## 2018-04-21 RX ORDER — SODIUM CHLORIDE 9 MG/ML
1000 INJECTION INTRAMUSCULAR; INTRAVENOUS; SUBCUTANEOUS
Qty: 0 | Refills: 0 | Status: DISCONTINUED | OUTPATIENT
Start: 2018-04-21 | End: 2018-04-24

## 2018-04-21 RX ADMIN — Medication 2: at 22:10

## 2018-04-21 RX ADMIN — ATORVASTATIN CALCIUM 40 MILLIGRAM(S): 80 TABLET, FILM COATED ORAL at 22:10

## 2018-04-21 RX ADMIN — PIPERACILLIN AND TAZOBACTAM 25 GRAM(S): 4; .5 INJECTION, POWDER, LYOPHILIZED, FOR SOLUTION INTRAVENOUS at 17:55

## 2018-04-21 RX ADMIN — PIPERACILLIN AND TAZOBACTAM 25 GRAM(S): 4; .5 INJECTION, POWDER, LYOPHILIZED, FOR SOLUTION INTRAVENOUS at 00:25

## 2018-04-21 RX ADMIN — PIPERACILLIN AND TAZOBACTAM 25 GRAM(S): 4; .5 INJECTION, POWDER, LYOPHILIZED, FOR SOLUTION INTRAVENOUS at 09:30

## 2018-04-21 RX ADMIN — LOSARTAN POTASSIUM 25 MILLIGRAM(S): 100 TABLET, FILM COATED ORAL at 05:20

## 2018-04-21 RX ADMIN — INSULIN GLARGINE 30 UNIT(S): 100 INJECTION, SOLUTION SUBCUTANEOUS at 22:10

## 2018-04-21 RX ADMIN — Medication 3: at 18:48

## 2018-04-21 RX ADMIN — SODIUM CHLORIDE 75 MILLILITER(S): 9 INJECTION INTRAMUSCULAR; INTRAVENOUS; SUBCUTANEOUS at 22:10

## 2018-04-21 RX ADMIN — HEPARIN SODIUM 600 UNIT(S)/HR: 5000 INJECTION INTRAVENOUS; SUBCUTANEOUS at 00:23

## 2018-04-21 RX ADMIN — Medication 81 MILLIGRAM(S): at 11:59

## 2018-04-21 RX ADMIN — AMLODIPINE BESYLATE 5 MILLIGRAM(S): 2.5 TABLET ORAL at 05:20

## 2018-04-21 RX ADMIN — HEPARIN SODIUM 600 UNIT(S)/HR: 5000 INJECTION INTRAVENOUS; SUBCUTANEOUS at 06:44

## 2018-04-21 RX ADMIN — HEPARIN SODIUM 600 UNIT(S)/HR: 5000 INJECTION INTRAVENOUS; SUBCUTANEOUS at 13:23

## 2018-04-21 RX ADMIN — Medication 3: at 13:23

## 2018-04-21 RX ADMIN — Medication 2: at 09:23

## 2018-04-21 RX ADMIN — Medication 25 MILLIGRAM(S): at 17:55

## 2018-04-21 RX ADMIN — SODIUM CHLORIDE 75 MILLILITER(S): 9 INJECTION INTRAMUSCULAR; INTRAVENOUS; SUBCUTANEOUS at 14:31

## 2018-04-21 RX ADMIN — CILOSTAZOL 50 MILLIGRAM(S): 100 TABLET ORAL at 05:20

## 2018-04-21 NOTE — PROGRESS NOTE ADULT - ASSESSMENT
78 year old lady with R 1st and 2nd toe gangrene, likely 2/2 PAD:  - Tolerating diet  - Pain control  - RAMIRO/PVR results noted; flows OK w/ no need for vasc intervention at this time  - Cards reccs appreciated; HIGH risk for Pod sx kush, require ECHO/Stress test   - F/u MRI to eval for OM  - Pods reccs appreciated  - Cont IV abx  - F/u labs  - Cont hep gtt, pt with hx of A Fib (Takes Eliquis at home)

## 2018-04-21 NOTE — PROGRESS NOTE ADULT - ASSESSMENT
77 yo F w/ IDDM, HTN, CAD s/p stents and CABG presents w/ R 1st and 2nd toe gangrene    #R 1st &2nd toe gangrene   - on ASA/Pletal  - on heparin gtt  - on Vanc/Zosyn  - per Vascular, no need for vasc intervention, may need podiatric surgery     #NUBIA  -prerenal picture, pt received CTA   -will start IVF @ 75 cc/hr  -avoid nephrotoxins    #CAD s/p stents/CABG   - Cardiology following, 2D echo and stress test ordered  - c/w ASA, statin    #HTN   - c/w norvasc, losartan, metoprolol  - bp controlled    #IDDM  - pt reports she takes Levemir 70 u Qam and 56 u qPM at home   - Hga1c 7.6 earlier this month, well controlled   - FS elevated here on lowered regimen, will uptitrate regimen     #DVT ppx  - already on systemic a/c w/ hep gtt 77 yo F w/ IDDM, HTN, CAD s/p stents and CABG presents w/ R 1st and 2nd toe gangrene    #R 1st &2nd toe gangrene   - on ASA/Pletal  - on heparin gtt  - on Vanc/Zosyn  - per Vascular, no need for vasc intervention, may need podiatric surgery     #NUBIA  -prerenal picture, pt received CTA   -will start IVF @ 75 cc/hr  -avoid nephrotoxins    #CAD s/p stents/CABG   - Cardiology following, 2D echo and stress test ordered  - c/w ASA, statin    #HTN   - c/w norvasc, metoprolol  - hold losartan in setting of NUBIA     #IDDM  - pt reports she takes Levemir 70 u Qam and 56 u qPM at home   - Hga1c 7.6 earlier this month, well controlled   - FS elevated here on lowered regimen, will uptitrate regimen     #DVT ppx  - already on systemic a/c w/ hep gtt

## 2018-04-21 NOTE — PROGRESS NOTE ADULT - ASSESSMENT
78 year old female RIGHT foot hallux and 2nd digit discoloration, non-infected  - Pt was examined and evaluated  - XR read as possible OM, does not correlate clinically & spoke with radiology has not changed significantly from prior XR image from 1 week ago  - no vascular plan for intervention  - Plan for local wound care only at this time  - awaiting MRI to r/o OM due to inconclusive XR read  - seen w/ attending

## 2018-04-22 LAB
APTT BLD: 59.5 SEC — HIGH (ref 27.5–37.4)
BUN SERPL-MCNC: 26 MG/DL — HIGH (ref 7–23)
CALCIUM SERPL-MCNC: 8.7 MG/DL — SIGNIFICANT CHANGE UP (ref 8.4–10.5)
CHLORIDE SERPL-SCNC: 99 MMOL/L — SIGNIFICANT CHANGE UP (ref 98–107)
CO2 SERPL-SCNC: 23 MMOL/L — SIGNIFICANT CHANGE UP (ref 22–31)
CREAT SERPL-MCNC: 1.48 MG/DL — HIGH (ref 0.5–1.3)
GLUCOSE SERPL-MCNC: 253 MG/DL — HIGH (ref 70–99)
HCT VFR BLD CALC: 32.1 % — LOW (ref 34.5–45)
HGB BLD-MCNC: 10.1 G/DL — LOW (ref 11.5–15.5)
MCHC RBC-ENTMCNC: 24.9 PG — LOW (ref 27–34)
MCHC RBC-ENTMCNC: 31.5 % — LOW (ref 32–36)
MCV RBC AUTO: 79.3 FL — LOW (ref 80–100)
NRBC # FLD: 0 — SIGNIFICANT CHANGE UP
PLATELET # BLD AUTO: 234 K/UL — SIGNIFICANT CHANGE UP (ref 150–400)
PMV BLD: 10.3 FL — SIGNIFICANT CHANGE UP (ref 7–13)
POTASSIUM SERPL-MCNC: 4.5 MMOL/L — SIGNIFICANT CHANGE UP (ref 3.5–5.3)
POTASSIUM SERPL-SCNC: 4.5 MMOL/L — SIGNIFICANT CHANGE UP (ref 3.5–5.3)
RBC # BLD: 4.05 M/UL — SIGNIFICANT CHANGE UP (ref 3.8–5.2)
RBC # FLD: 15.9 % — HIGH (ref 10.3–14.5)
SODIUM SERPL-SCNC: 134 MMOL/L — LOW (ref 135–145)
WBC # BLD: 4.61 K/UL — SIGNIFICANT CHANGE UP (ref 3.8–10.5)
WBC # FLD AUTO: 4.61 K/UL — SIGNIFICANT CHANGE UP (ref 3.8–10.5)

## 2018-04-22 PROCEDURE — 93306 TTE W/DOPPLER COMPLETE: CPT | Mod: 26

## 2018-04-22 PROCEDURE — 99233 SBSQ HOSP IP/OBS HIGH 50: CPT

## 2018-04-22 PROCEDURE — 93016 CV STRESS TEST SUPVJ ONLY: CPT | Mod: GC

## 2018-04-22 PROCEDURE — 73720 MRI LWR EXTREMITY W/O&W/DYE: CPT | Mod: 26,RT

## 2018-04-22 PROCEDURE — 78452 HT MUSCLE IMAGE SPECT MULT: CPT | Mod: 26

## 2018-04-22 PROCEDURE — 93018 CV STRESS TEST I&R ONLY: CPT | Mod: GC

## 2018-04-22 RX ORDER — SODIUM CHLORIDE 9 MG/ML
1 INJECTION INTRAMUSCULAR; INTRAVENOUS; SUBCUTANEOUS ONCE
Qty: 0 | Refills: 0 | Status: COMPLETED | OUTPATIENT
Start: 2018-04-22 | End: 2018-04-22

## 2018-04-22 RX ORDER — OXYCODONE HYDROCHLORIDE 5 MG/1
5 TABLET ORAL EVERY 4 HOURS
Qty: 0 | Refills: 0 | Status: DISCONTINUED | OUTPATIENT
Start: 2018-04-22 | End: 2018-04-25

## 2018-04-22 RX ORDER — VANCOMYCIN HCL 1 G
1250 VIAL (EA) INTRAVENOUS EVERY 24 HOURS
Qty: 0 | Refills: 0 | Status: DISCONTINUED | OUTPATIENT
Start: 2018-04-22 | End: 2018-04-24

## 2018-04-22 RX ORDER — INSULIN GLARGINE 100 [IU]/ML
35 INJECTION, SOLUTION SUBCUTANEOUS AT BEDTIME
Qty: 0 | Refills: 0 | Status: DISCONTINUED | OUTPATIENT
Start: 2018-04-22 | End: 2018-04-25

## 2018-04-22 RX ORDER — INSULIN LISPRO 100/ML
7 VIAL (ML) SUBCUTANEOUS
Qty: 0 | Refills: 0 | Status: DISCONTINUED | OUTPATIENT
Start: 2018-04-22 | End: 2018-04-25

## 2018-04-22 RX ORDER — INSULIN LISPRO 100/ML
VIAL (ML) SUBCUTANEOUS
Qty: 0 | Refills: 0 | Status: DISCONTINUED | OUTPATIENT
Start: 2018-04-22 | End: 2018-04-23

## 2018-04-22 RX ORDER — OXYCODONE HYDROCHLORIDE 5 MG/1
10 TABLET ORAL EVERY 4 HOURS
Qty: 0 | Refills: 0 | Status: DISCONTINUED | OUTPATIENT
Start: 2018-04-22 | End: 2018-04-25

## 2018-04-22 RX ADMIN — Medication 25 MILLIGRAM(S): at 18:11

## 2018-04-22 RX ADMIN — Medication 2: at 13:51

## 2018-04-22 RX ADMIN — INSULIN GLARGINE 35 UNIT(S): 100 INJECTION, SOLUTION SUBCUTANEOUS at 22:53

## 2018-04-22 RX ADMIN — AMLODIPINE BESYLATE 5 MILLIGRAM(S): 2.5 TABLET ORAL at 05:29

## 2018-04-22 RX ADMIN — Medication 250 MILLIGRAM(S): at 00:06

## 2018-04-22 RX ADMIN — Medication 6: at 11:24

## 2018-04-22 RX ADMIN — PIPERACILLIN AND TAZOBACTAM 25 GRAM(S): 4; .5 INJECTION, POWDER, LYOPHILIZED, FOR SOLUTION INTRAVENOUS at 18:21

## 2018-04-22 RX ADMIN — SODIUM CHLORIDE 75 MILLILITER(S): 9 INJECTION INTRAMUSCULAR; INTRAVENOUS; SUBCUTANEOUS at 22:53

## 2018-04-22 RX ADMIN — SODIUM CHLORIDE 1 GRAM(S): 9 INJECTION INTRAMUSCULAR; INTRAVENOUS; SUBCUTANEOUS at 11:16

## 2018-04-22 RX ADMIN — Medication 7 UNIT(S): at 18:11

## 2018-04-22 RX ADMIN — ATORVASTATIN CALCIUM 40 MILLIGRAM(S): 80 TABLET, FILM COATED ORAL at 22:51

## 2018-04-22 RX ADMIN — HEPARIN SODIUM 600 UNIT(S)/HR: 5000 INJECTION INTRAVENOUS; SUBCUTANEOUS at 07:45

## 2018-04-22 RX ADMIN — PIPERACILLIN AND TAZOBACTAM 25 GRAM(S): 4; .5 INJECTION, POWDER, LYOPHILIZED, FOR SOLUTION INTRAVENOUS at 11:16

## 2018-04-22 RX ADMIN — Medication 81 MILLIGRAM(S): at 13:02

## 2018-04-22 RX ADMIN — OXYCODONE HYDROCHLORIDE 5 MILLIGRAM(S): 5 TABLET ORAL at 19:15

## 2018-04-22 RX ADMIN — SODIUM CHLORIDE 75 MILLILITER(S): 9 INJECTION INTRAMUSCULAR; INTRAVENOUS; SUBCUTANEOUS at 23:31

## 2018-04-22 RX ADMIN — PIPERACILLIN AND TAZOBACTAM 25 GRAM(S): 4; .5 INJECTION, POWDER, LYOPHILIZED, FOR SOLUTION INTRAVENOUS at 01:08

## 2018-04-22 RX ADMIN — Medication 166.67 MILLIGRAM(S): at 23:31

## 2018-04-22 RX ADMIN — Medication 25 MILLIGRAM(S): at 05:29

## 2018-04-22 RX ADMIN — OXYCODONE HYDROCHLORIDE 5 MILLIGRAM(S): 5 TABLET ORAL at 18:21

## 2018-04-22 NOTE — PROGRESS NOTE ADULT - ASSESSMENT
Cad history of cabg and stent  cont asa  cont statin  awaiting stress test    HTN  stable   cont current meds    DM  Monitor finger stick. Insulin coverage. Diabetic education and Diabetic diet. Consider nutrition consultation.     Preop  Based on current ACC/AHA guidelines, patient history and physical exam, the patient is considered to have high risk  may proceed for podiatry surgery under local  will get echo and stress test for possible higher risk surgery

## 2018-04-22 NOTE — PROGRESS NOTE ADULT - ASSESSMENT
79 yo F w/ IDDM, HTN, CAD s/p stents and CABG presents w/ R 1st and 2nd toe gangrene    #R 1st &2nd toe gangrene   - on ASA/Pletal  - on heparin gtt  - on Vanc/Zosyn  - per Vascular, no need for vasc intervention, podiatric intervention TBD   - awaiting MRI foot     #NUBIA  -prerenal picture, pt received CTA   -will start IVF @ 75 cc/hr  -avoid nephrotoxins  -improving, c/w fluids for now     #CAD s/p stents/CABG   - Cardiology following, 2D echo and stress test ordered  - c/w ASA, statin    #HTN   - c/w norvasc, metoprolol  - hold losartan in setting of NUBIA     #IDDM  - pt reports she takes Levemir 70 u Qam and 56 u qPM at home   - Hga1c 7.6 earlier this month, well controlled   - FS elevated here on lowered regimen, will uptitrate regimen     #DVT ppx  - already on systemic a/c w/ hep gtt

## 2018-04-22 NOTE — PROGRESS NOTE ADULT - ASSESSMENT
78 year old lady with R 1st and 2nd toe gangrene, likely 2/2 PAD: Awaiting stress test, MRI.  - Tolerating diet  - Pain control  - RAMIRO/PVR results noted; flows OK w/ no need for vasc intervention at this time  - Cards reccs appreciated; HIGH risk for Pod sx kush, require ECHO/Stress test  - f/u stress test   - F/u MRI to eval for OM  - Pods reccs appreciated  - Cont IV abx  - F/u labs  - Cont hep gtt, pt with hx of A Fib (Takes Eliquis at home)

## 2018-04-23 LAB
APTT BLD: 61 SEC — HIGH (ref 27.5–37.4)
BUN SERPL-MCNC: 23 MG/DL — SIGNIFICANT CHANGE UP (ref 7–23)
CALCIUM SERPL-MCNC: 8.8 MG/DL — SIGNIFICANT CHANGE UP (ref 8.4–10.5)
CHLORIDE SERPL-SCNC: 103 MMOL/L — SIGNIFICANT CHANGE UP (ref 98–107)
CO2 SERPL-SCNC: 22 MMOL/L — SIGNIFICANT CHANGE UP (ref 22–31)
CREAT SERPL-MCNC: 1.38 MG/DL — HIGH (ref 0.5–1.3)
GLUCOSE SERPL-MCNC: 216 MG/DL — HIGH (ref 70–99)
HCT VFR BLD CALC: 33 % — LOW (ref 34.5–45)
HGB BLD-MCNC: 10.2 G/DL — LOW (ref 11.5–15.5)
MAGNESIUM SERPL-MCNC: 1.8 MG/DL — SIGNIFICANT CHANGE UP (ref 1.6–2.6)
MCHC RBC-ENTMCNC: 24.9 PG — LOW (ref 27–34)
MCHC RBC-ENTMCNC: 30.9 % — LOW (ref 32–36)
MCV RBC AUTO: 80.5 FL — SIGNIFICANT CHANGE UP (ref 80–100)
NRBC # FLD: 0 — SIGNIFICANT CHANGE UP
PHOSPHATE SERPL-MCNC: 3.4 MG/DL — SIGNIFICANT CHANGE UP (ref 2.5–4.5)
PLATELET # BLD AUTO: 218 K/UL — SIGNIFICANT CHANGE UP (ref 150–400)
PMV BLD: 10.8 FL — SIGNIFICANT CHANGE UP (ref 7–13)
POTASSIUM SERPL-MCNC: 4.5 MMOL/L — SIGNIFICANT CHANGE UP (ref 3.5–5.3)
POTASSIUM SERPL-SCNC: 4.5 MMOL/L — SIGNIFICANT CHANGE UP (ref 3.5–5.3)
RBC # BLD: 4.1 M/UL — SIGNIFICANT CHANGE UP (ref 3.8–5.2)
RBC # FLD: 16.4 % — HIGH (ref 10.3–14.5)
SODIUM SERPL-SCNC: 136 MMOL/L — SIGNIFICANT CHANGE UP (ref 135–145)
WBC # BLD: 4.64 K/UL — SIGNIFICANT CHANGE UP (ref 3.8–10.5)
WBC # FLD AUTO: 4.64 K/UL — SIGNIFICANT CHANGE UP (ref 3.8–10.5)

## 2018-04-23 PROCEDURE — 99223 1ST HOSP IP/OBS HIGH 75: CPT | Mod: GC

## 2018-04-23 PROCEDURE — 99231 SBSQ HOSP IP/OBS SF/LOW 25: CPT

## 2018-04-23 PROCEDURE — 99233 SBSQ HOSP IP/OBS HIGH 50: CPT

## 2018-04-23 RX ORDER — MAGNESIUM SULFATE 500 MG/ML
2 VIAL (ML) INJECTION ONCE
Qty: 0 | Refills: 0 | Status: COMPLETED | OUTPATIENT
Start: 2018-04-23 | End: 2018-04-23

## 2018-04-23 RX ORDER — INSULIN LISPRO 100/ML
VIAL (ML) SUBCUTANEOUS EVERY 6 HOURS
Qty: 0 | Refills: 0 | Status: DISCONTINUED | OUTPATIENT
Start: 2018-04-23 | End: 2018-04-24

## 2018-04-23 RX ADMIN — PIPERACILLIN AND TAZOBACTAM 25 GRAM(S): 4; .5 INJECTION, POWDER, LYOPHILIZED, FOR SOLUTION INTRAVENOUS at 02:05

## 2018-04-23 RX ADMIN — Medication 7 UNIT(S): at 09:21

## 2018-04-23 RX ADMIN — ATORVASTATIN CALCIUM 40 MILLIGRAM(S): 80 TABLET, FILM COATED ORAL at 21:20

## 2018-04-23 RX ADMIN — INSULIN GLARGINE 35 UNIT(S): 100 INJECTION, SOLUTION SUBCUTANEOUS at 22:53

## 2018-04-23 RX ADMIN — HEPARIN SODIUM 600 UNIT(S)/HR: 5000 INJECTION INTRAVENOUS; SUBCUTANEOUS at 07:26

## 2018-04-23 RX ADMIN — Medication 25 MILLIGRAM(S): at 06:25

## 2018-04-23 RX ADMIN — Medication 166.67 MILLIGRAM(S): at 22:52

## 2018-04-23 RX ADMIN — PIPERACILLIN AND TAZOBACTAM 25 GRAM(S): 4; .5 INJECTION, POWDER, LYOPHILIZED, FOR SOLUTION INTRAVENOUS at 10:52

## 2018-04-23 RX ADMIN — Medication 81 MILLIGRAM(S): at 13:15

## 2018-04-23 RX ADMIN — SODIUM CHLORIDE 75 MILLILITER(S): 9 INJECTION INTRAMUSCULAR; INTRAVENOUS; SUBCUTANEOUS at 18:29

## 2018-04-23 RX ADMIN — Medication 50 GRAM(S): at 09:16

## 2018-04-23 RX ADMIN — Medication 7 UNIT(S): at 13:52

## 2018-04-23 RX ADMIN — Medication 2: at 09:22

## 2018-04-23 RX ADMIN — PIPERACILLIN AND TAZOBACTAM 25 GRAM(S): 4; .5 INJECTION, POWDER, LYOPHILIZED, FOR SOLUTION INTRAVENOUS at 18:31

## 2018-04-23 RX ADMIN — Medication 7 UNIT(S): at 18:30

## 2018-04-23 RX ADMIN — AMLODIPINE BESYLATE 5 MILLIGRAM(S): 2.5 TABLET ORAL at 06:25

## 2018-04-23 NOTE — CONSULT NOTE ADULT - SUBJECTIVE AND OBJECTIVE BOX
Infectious Diseases Consult note  Patient is a 78y old  Female who presents with a chief complaint of referred by her Vascular Surgeon for gangrene of right toes (2018 19:20)    KUMAR NAGEL  MRN-[id number]  HPI:  78 female with DM, CAD s/p CABG, stent and MI, PAD, HTN, HLD on anticoagulation sent here by her Vascular Surgeon for RLE 1st and 2nd toe gangrene. She complains of pain in her right foot for several years. She was recently admitted to [facility] following a fall. She was recently admitted to [facility] where she was recommended for an angiogram. No sick contacts. No recent travel. Admitted on 18 for Right 1st &2nd toe gangrene and osteomyelitis and started on vancomycin and zosyn. as per vascular notes: RAMIRO/PVR results OK w/ no need for vasc intervention at this time. as per podiatry notes: - MRI showing OM of distal hallux and poss OM distal 2nd toe- would elect to attempt long term abx prior to sx intervention. COurse complicated by NUBIA which is improving from Cr 1.83 to 1.38.     REVIEW OF SYSTEMS  All as below unless noted otherwise  General:  Denies fever and chills. 	  Ophthalmologic: Denies any visual complaints. 	  ENMT: No throat pain.  Respiratory: No cough, sputum. No shortness of breath.   Cardiovascular: No chest pain.   Gastrointestinal: No nausea or vomiting. No abdominal pain or diarrhea.   Genitourinary: No burning urine, no frequency. No flank pain  Musculoskeletal: No joint swelling or pain.   Neurological: No confusion. 	  Skin: No rash    PAST MEDICAL & SURGICAL HISTORY:  MI (myocardial infarction)  CAD (coronary artery disease)  Hypertension  Diabetes  Hyperlipemia  Diabetes type 2, uncontrolled  CAD (coronary artery disease)  H/O: hysterectomy  S/P coronary artery stent placement  S/P CABG x 3      FAMILY HISTORY:  No pertinent family history in first degree relatives  No pertinent family history in first degree relatives      SOCIAL HISTORY:    non smoker    no alcohol  no illicit drugs    ANTIMICROBIALS:    piperacillin/tazobactam IVPB. 3.375 every 8 hours  vancomycin  IVPB 1250 every 24 hours    OTHER MEDS:    amLODIPine   Tablet 5 milliGRAM(s) Oral daily  aspirin  chewable 81 milliGRAM(s) Oral daily  atorvastatin 40 milliGRAM(s) Oral at bedtime  glucagon  Injectable 1 milliGRAM(s) IntraMuscular once PRN  heparin  Infusion. 1200 Unit(s)/Hr IV Continuous <Continuous>  insulin glargine Injectable (LANTUS) 35 Unit(s) SubCutaneous at bedtime  insulin lispro (HumaLOG) corrective regimen sliding scale   SubCutaneous three times a day before meals  insulin lispro (HumaLOG) corrective regimen sliding scale   SubCutaneous at bedtime  insulin lispro Injectable (HumaLOG) 7 Unit(s) SubCutaneous three times a day before meals  metoprolol tartrate 25 milliGRAM(s) Oral every 12 hours  oxyCODONE    IR 5 milliGRAM(s) Oral every 4 hours PRN  oxyCODONE    IR 10 milliGRAM(s) Oral every 4 hours PRN  sodium chloride 0.9%. 1000 milliLiter(s) IV Continuous <Continuous>    Allergies  No Known Allergies    Vital Signs Last 24 Hrs  T(C): 36.7 (2018 09:33), Max: 36.7 (2018 09:33)  T(F): 98.1 (2018 09:33), Max: 98.1 (2018 09:33)  HR: 70 (2018 09:33) (70 - 78)  BP: 130/58 (2018 09:33) (116/67 - 157/54)  RR: 18 (2018 09:33) (17 - 19)  SpO2: 100% (2018 09:33) (100% - 100%)  POCT Blood Glucose.: 177 mg/dL (2018 08:52)    Daily Weight in k.7 (2018 01:38)    PHYSICAL EXAM:  patient in no acute respiratory distress.  Constitutional: Comfortable. Awake and alert  Eyes: PERRL EOMI. No discharge.  ENMT: No sinus tenderness.  No pharyngeal erythema.   Neck: Supple. No thyromegaly   Respiratory:  air entry bilaterally, no wheezes, rhonchi, or crackles  Cardiovascular:S1 S2 wnl, No murmurs  Gastrointestinal: Soft, no tenderness , bowel sounds present,   Genitourinary: No suprapubic tenderness. no CVA tenderness   Musculoskeletal: No joint swelling. No edema.  Vascular: peripheral pulses felt  Neurological: No grossly focal deficits  Skin: No rash   Lymph Nodes: No palpable Lymphadenopathy   Psychiatric: Affect normal  Lines:                         10.2   4.64  )-----------( 218      ( 2018 06:15 )             33.0     WBC Count: 4.64 ( @ 06:15)  WBC Count: 4.61 ( @ 05:16)  WBC Count: 5.01 ( @ 05:30)  WBC Count: 6.12 ( @ 13:57)  WBC Count: 5.43 ( @ 05:30)  WBC Count: 7.13 ( @ 17:09)        136  |  103  |  23  ----------------------------<  216<H>  4.5   |  22  |  1.38<H>    Ca    8.8      2018 06:15  Phos  3.4       Mg     1.8           PTT - ( 2018 06:15 )  PTT:61.0 SEC    Lactate, Blood: 0.7 mmol/L (18 @ 17:49)              MICROBIOLOGY:    Culture - Blood in AM (18 @ 17:03)    Specimen Source: .Blood Blood-Peripheral    Culture Results:   No growth at 5 days.    Culture - Blood (18 @ 00:57)    Specimen Source: .Blood Blood-Peripheral    Culture Results:   No growth at 5 days.          v  RADIOLOGY:    < from: MR Foot w/wo IV Cont, Right (18 @ 22:34) >  There is diffuse bone marrow edema and enhancement throughout the distal   phalanx of the great toe with subtle patchy low T1 signal abnormality and   postcontrast enhancement. This appears compatible with osteomyelitis.   There is mild marrow edema and enhancement in the distal aspect of the   distal phalanx of the second toe without definitive T1 signal   abnormality. This may reflect early osteomyelitis. There is prominent   soft tissue swelling and enhancement within the soft tissues surrounding   both the first and second toes.    There is no osteomyelitis noted elsewhere.    There is diffuse mild dorsal subcutaneous soft tissue edema with minimal   enhancement consistent with cellulitis.    There is no soft tissue abscess identified.    Mild nonspecific intramuscular edema is seen within the intrinsic muscles   of the midfoot and forefoot.    IMPRESSION:Osteomyelitis of the distal phalanx of the great toe.   Suspected early osteomyelitis within the distal phalanx of the second toe.  < from: CT Angio Abd Aorta w/run-off w/ IV Cont (18 @ 21:09) >    ABDOMEN AND PELVIS ARTERIAL SYSTEM:     ABDOMINAL AORTA: Widely patent.  CELIAC ARTERY: Widely patent. A replaced right hepatic artery arising   from the left gastric artery.  SUPERIOR MESENTERIC ARTERY (SMA): Widely patent.  INFERIOR MESENTERIC ARTERY: Patent.  RIGHT RENAL ARTERY: Mild calcifications of the origin but otherwise   patent.  LEFT RENAL ARTERY: Widely patent.    RIGHT LOWER EXTREMITY:     COMMON ILIAC ARTERY: Mild atherosclerotic disease. Patent.  EXTERNAL ILIAC ARTERY: Mild atherosclerotic disease. Patent.  INTERNAL ILIAC ARTERY: At least moderate atherosclerotic disease. Patent.  COMMON FEMORAL ARTERY: Widely patent.   FEMORAL ARTERY: Mild multilevel calcifications but otherwise patent.   PROFUNDA FEMORAL ARTERY: Patent.  POPLITEAL ARTERY: Widely patent.  ANTERIOR TIBIAL ARTERY: Widely patent.  TIBIOPERONEAL TRUNK: Widely patent.  POSTERIOR TIBIAL ARTERY: Mild to moderate atherosclerotic disease   diffusely and is not well evaluated.  PERONEAL ARTERY: Mild atherosclerotic disease but patent.    LEFT LOWER EXTREMITY:    COMMON ILIAC ARTERY: Widely patent.  EXTERNAL ILIAC ARTERY: Widely patent.  INTERNAL ILIAC ARTERY: Scattered moderate atherosclerotic disease. Patent.  COMMON FEMORAL ARTERY: Widely patent.  FEMORAL ARTERY: Mild to moderate atherosclerotic disease diffusely but   otherwise patent.  PROFUNDA FEMORAL ARTERY: Widely patent.  POPLITEAL ARTERY: Widely patent.  ANTERIOR TIBIAL ARTERY: Widely patent.  TIBIOPERONEAL TRUNK: Widely patent.  POSTERIOR TIBIAL ARTERY: Mild to moderate atherosclerotic disease   diffusely and is not well evaluated.  PERONEAL ARTERY: Widely patent.    ADDITIONAL FINDINGS: Very mild groundglass opacities in bilateral lower   lobes. There is a 4.6 cm left renal cyst. Subcentimeter right renal   hypodensities are too small characterize. Status post hysterectomy. No   adnexal mass.    IMPRESSION: Multilevel atherosclerotic disease diffusely involving the   posterior tibial arteries bilaterally,which are not well evaluated and   may be occluded. There is distal reconstitution on the left at the level   of the plantar arch. Otherwise two-vessel runoff in bilateral lower   extremities.    < from: Xray Foot AP + Lateral + Oblique, Right (18 @ 17:18) >  There is a possible region of focal lucency with cortical erosion at the   lateral aspect of the tuft of the right first distal phalanx, suggestive   of osteomyelitis.    The visualized joint spaces are unremarkable.        < from: Transthoracic Echocardiogram (18 @ 08:27) >  1. Moderate segmental left ventricular systolic  dysfunction. The inferior and inferolateral walls are  severely hypokinetic.  2. Normal right ventricular size and function.    < from: VA Duplex Physiol Ext Low 3+ Level, BI. (0420.18 @ 11:00) >  1.  Right RAMIRO is normal (1.02).  Right TBI is moderately  decreased (0.50).  The right toe pressure is 62 mmHg.  Waveform analysis suggests the presence of small vessel  arterial disease in the right foot.  2. Left RAMIRO is normal (1.03).  Left TBI is moderately  decreased (0.48). The left toe pressure is 59 mmHg.  Although the TBI is decreased, waveforms in the left toes  appear normal. Infectious Diseases Consult note  Patient is a 78y old  Female who presents with a chief complaint of referred by her Vascular Surgeon for gangrene of right toes (2018 19:20)    KUMAR NAGEL  MRN-[id number]  HPI:  78 female with DM, CAD s/p CABG, stent and MI, PAD, HTN, HLD on anticoagulation sent here by her Vascular Surgeon for RLE 1st and 2nd toe gangrene. She complains of pain in her right foot for several years. She was recently admitted to [facility] following a fall. She was recently admitted to [place] where she was recommended for an angiogram. No sick contacts. No recent travel. Admitted on 18 for Right 1st &2nd toe gangrene and osteomyelitis and started on vancomycin and zosyn. as per vascular notes: RAMIRO/PVR results OK w/ no need for vasc intervention at this time. as per podiatry notes: - MRI showing OM of distal hallux and poss OM distal 2nd toe- would elect to attempt long term abx prior to sx intervention. COurse complicated by NUBIA which is improving from Cr 1.83 to 1.38.     REVIEW OF SYSTEMS  All as below unless noted otherwise  General:  Denies fever and chills. 	  Ophthalmologic: Denies any visual complaints. 	  ENMT: No throat pain.  Respiratory: No cough, sputum. No shortness of breath.   Cardiovascular: No chest pain.   Gastrointestinal: No nausea or vomiting. No abdominal pain or diarrhea.   Genitourinary: No burning urine, no frequency. No flank pain  Musculoskeletal: No joint swelling or pain. has numbness of her right toes  Neurological: No confusion. 	  Skin: No rash    PAST MEDICAL & SURGICAL HISTORY:  MI (myocardial infarction)  CAD (coronary artery disease)  Hypertension  Diabetes  Hyperlipemia  Diabetes type 2, uncontrolled  CAD (coronary artery disease)  H/O: hysterectomy  S/P coronary artery stent placement  S/P CABG x 3      FAMILY HISTORY:  No pertinent family history in first degree relatives  No pertinent family history in first degree relatives      SOCIAL HISTORY:    non smoker    no alcohol  no illicit drugs  used to work at [employer]     ANTIMICROBIALS:    piperacillin/tazobactam IVPB. 3.375 every 8 hours  vancomycin  IVPB 1250 every 24 hours    OTHER MEDS:    amLODIPine   Tablet 5 milliGRAM(s) Oral daily  aspirin  chewable 81 milliGRAM(s) Oral daily  atorvastatin 40 milliGRAM(s) Oral at bedtime  glucagon  Injectable 1 milliGRAM(s) IntraMuscular once PRN  heparin  Infusion. 1200 Unit(s)/Hr IV Continuous <Continuous>  insulin glargine Injectable (LANTUS) 35 Unit(s) SubCutaneous at bedtime  insulin lispro (HumaLOG) corrective regimen sliding scale   SubCutaneous three times a day before meals  insulin lispro (HumaLOG) corrective regimen sliding scale   SubCutaneous at bedtime  insulin lispro Injectable (HumaLOG) 7 Unit(s) SubCutaneous three times a day before meals  metoprolol tartrate 25 milliGRAM(s) Oral every 12 hours  oxyCODONE    IR 5 milliGRAM(s) Oral every 4 hours PRN  oxyCODONE    IR 10 milliGRAM(s) Oral every 4 hours PRN  sodium chloride 0.9%. 1000 milliLiter(s) IV Continuous <Continuous>    Allergies  No Known Allergies    Vital Signs Last 24 Hrs  T(C): 36.7 (2018 09:33), Max: 36.7 (2018 09:33)  T(F): 98.1 (2018 09:33), Max: 98.1 (2018 09:33)  HR: 70 (2018 09:33) (70 - 78)  BP: 130/58 (2018 09:33) (116/67 - 157/54)  RR: 18 (2018 09:33) (17 - 19)  SpO2: 100% (2018 09:33) (100% - 100%)  POCT Blood Glucose.: 177 mg/dL (2018 08:52)    Daily Weight in k.7 (2018 01:38)    PHYSICAL EXAM:  patient in no acute respiratory distress.  Constitutional: Comfortable. Awake and alert  Eyes: PERRL EOMI. No discharge.  ENMT: No sinus tenderness.  No pharyngeal erythema.   Neck: Supple. No thyromegaly   Respiratory:  air entry bilaterally, no wheezes, rhonchi, or crackles  Cardiovascular:S1 S2 wnl, No murmurs  Gastrointestinal: Soft, no tenderness , bowel sounds present,   Genitourinary: No suprapubic tenderness. no CVA tenderness   Musculoskeletal: right hallux nail absent, no erythema, no tenderness, no drainage - has numbness   some purple bruise like color on ventral surface of 2nd toe   Vascular: peripheral pulses felt  Neurological: No grossly focal deficits  Skin: No rash   Lymph Nodes: No palpable Lymphadenopathy   Psychiatric: Affect normal  Lines:                         10.2   4.64  )-----------( 218      ( 2018 06:15 )             33.0     WBC Count: 4.64 ( @ 06:15)  WBC Count: 4.61 ( @ 05:16)  WBC Count: 5.01 ( @ 05:30)  WBC Count: 6.12 ( @ 13:57)  WBC Count: 5.43 ( @ 05:30)  WBC Count: 7.13 ( @ 17:09)        136  |  103  |  23  ----------------------------<  216<H>  4.5   |  22  |  1.38<H>    Ca    8.8      2018 06:15  Phos  3.4       Mg     1.8           PTT - ( 2018 06:15 )  PTT:61.0 SEC    Lactate, Blood: 0.7 mmol/L (18 @ 17:49)              MICROBIOLOGY:    Culture - Blood in AM (18 @ 17:03)    Specimen Source: .Blood Blood-Peripheral    Culture Results:   No growth at 5 days.    Culture - Blood (18 @ 00:57)    Specimen Source: .Blood Blood-Peripheral    Culture Results:   No growth at 5 days.          v  RADIOLOGY:    < from: MR Foot w/wo IV Cont, Right (18 @ 22:34) >  There is diffuse bone marrow edema and enhancement throughout the distal   phalanx of the great toe with subtle patchy low T1 signal abnormality and   postcontrast enhancement. This appears compatible with osteomyelitis.   There is mild marrow edema and enhancement in the distal aspect of the   distal phalanx of the second toe without definitive T1 signal   abnormality. This may reflect early osteomyelitis. There is prominent   soft tissue swelling and enhancement within the soft tissues surrounding   both the first and second toes.    There is no osteomyelitis noted elsewhere.    There is diffuse mild dorsal subcutaneous soft tissue edema with minimal   enhancement consistent with cellulitis.    There is no soft tissue abscess identified.    Mild nonspecific intramuscular edema is seen within the intrinsic muscles   of the midfoot and forefoot.    IMPRESSION:Osteomyelitis of the distal phalanx of the great toe.   Suspected early osteomyelitis within the distal phalanx of the second toe.  < from: CT Angio Abd Aorta w/run-off w/ IV Cont (18 @ 21:09) >    ABDOMEN AND PELVIS ARTERIAL SYSTEM:     ABDOMINAL AORTA: Widely patent.  CELIAC ARTERY: Widely patent. A replaced right hepatic artery arising   from the left gastric artery.  SUPERIOR MESENTERIC ARTERY (SMA): Widely patent.  INFERIOR MESENTERIC ARTERY: Patent.  RIGHT RENAL ARTERY: Mild calcifications of the origin but otherwise   patent.  LEFT RENAL ARTERY: Widely patent.    RIGHT LOWER EXTREMITY:     COMMON ILIAC ARTERY: Mild atherosclerotic disease. Patent.  EXTERNAL ILIAC ARTERY: Mild atherosclerotic disease. Patent.  INTERNAL ILIAC ARTERY: At least moderate atherosclerotic disease. Patent.  COMMON FEMORAL ARTERY: Widely patent.   FEMORAL ARTERY: Mild multilevel calcifications but otherwise patent.   PROFUNDA FEMORAL ARTERY: Patent.  POPLITEAL ARTERY: Widely patent.  ANTERIOR TIBIAL ARTERY: Widely patent.  TIBIOPERONEAL TRUNK: Widely patent.  POSTERIOR TIBIAL ARTERY: Mild to moderate atherosclerotic disease   diffusely and is not well evaluated.  PERONEAL ARTERY: Mild atherosclerotic disease but patent.    LEFT LOWER EXTREMITY:    COMMON ILIAC ARTERY: Widely patent.  EXTERNAL ILIAC ARTERY: Widely patent.  INTERNAL ILIAC ARTERY: Scattered moderate atherosclerotic disease. Patent.  COMMON FEMORAL ARTERY: Widely patent.  FEMORAL ARTERY: Mild to moderate atherosclerotic disease diffusely but   otherwise patent.  PROFUNDA FEMORAL ARTERY: Widely patent.  POPLITEAL ARTERY: Widely patent.  ANTERIOR TIBIAL ARTERY: Widely patent.  TIBIOPERONEAL TRUNK: Widely patent.  POSTERIOR TIBIAL ARTERY: Mild to moderate atherosclerotic disease   diffusely and is not well evaluated.  PERONEAL ARTERY: Widely patent.    ADDITIONAL FINDINGS: Very mild groundglass opacities in bilateral lower   lobes. There is a 4.6 cm left renal cyst. Subcentimeter right renal   hypodensities are too small characterize. Status post hysterectomy. No   adnexal mass.    IMPRESSION: Multilevel atherosclerotic disease diffusely involving the   posterior tibial arteries bilaterally,which are not well evaluated and   may be occluded. There is distal reconstitution on the left at the level   of the plantar arch. Otherwise two-vessel runoff in bilateral lower   extremities.    < from: Xray Foot AP + Lateral + Oblique, Right (18 @ 17:18) >  There is a possible region of focal lucency with cortical erosion at the   lateral aspect of the tuft of the right first distal phalanx, suggestive   of osteomyelitis.    The visualized joint spaces are unremarkable.        < from: Transthoracic Echocardiogram (04.22.18 @ 08:27) >  1. Moderate segmental left ventricular systolic  dysfunction. The inferior and inferolateral walls are  severely hypokinetic.  2. Normal right ventricular size and function.    < from: VA Duplex Physiol Ext Low 3+ Level, BI. (. @ 11:00) >  1.  Right RAMIRO is normal (1.02).  Right TBI is moderately  decreased (0.50).  The right toe pressure is 62 mmHg.  Waveform analysis suggests the presence of small vessel  arterial disease in the right foot.  2. Left RAMIRO is normal (1.03).  Left TBI is moderately  decreased (0.48). The left toe pressure is 59 mmHg.  Although the TBI is decreased, waveforms in the left toes  appear normal. Infectious Diseases Consult note  Patient is a 78y old  Female who presents with a chief complaint of referred by her Vascular Surgeon for gangrene of right toes (2018 19:20)    KUMAR NAGEL  MRN-[id number]  HPI:  78 female with DM, CAD s/p CABG, stent and MI, PAD, HTN, HLD on anticoagulation sent here by her Vascular Surgeon for RLE 1st and 2nd toe gangrene. She complains of pain in her right foot for several years. She was recently admitted to [facility] following a fall. She was recently admitted to [place] where she was recommended for an angiogram. No sick contacts. No recent travel. Admitted on 18 for Right 1st &2nd toe gangrene and osteomyelitis and started on vancomycin and zosyn. as per vascular notes: RAMIRO/PVR results OK w/ no need for vasc intervention at this time. as per podiatry notes: - MRI showing OM of distal hallux and poss OM distal 2nd toe- would elect to attempt long term abx prior to sx intervention. COurse complicated by NUBIA which is improving from Cr 1.83 to 1.38.     REVIEW OF SYSTEMS  All as below unless noted otherwise  General:  Denies fever and chills. 	  Ophthalmologic: Denies any visual complaints. 	  ENMT: No throat pain.  Respiratory: No cough, sputum. No shortness of breath.   Cardiovascular: No chest pain.   Gastrointestinal: No nausea or vomiting. No abdominal pain or diarrhea.   Genitourinary: No burning urine, no frequency. No flank pain  Musculoskeletal: No joint swelling or pain. has numbness of her right toes  Neurological: No confusion. 	  Skin: No rash    PAST MEDICAL & SURGICAL HISTORY:  MI (myocardial infarction)  CAD (coronary artery disease)  Hypertension  Diabetes  Hyperlipemia  Diabetes type 2, uncontrolled  CAD (coronary artery disease)  H/O: hysterectomy  S/P coronary artery stent placement  S/P CABG x 3      FAMILY HISTORY:  No pertinent family history in first degree relatives  No pertinent family history in first degree relatives      SOCIAL HISTORY:    non smoker    no alcohol  no illicit drugs  used to work at [employer]     ANTIMICROBIALS:    piperacillin/tazobactam IVPB. 3.375 every 8 hours  vancomycin  IVPB 1250 every 24 hours    OTHER MEDS:    amLODIPine   Tablet 5 milliGRAM(s) Oral daily  aspirin  chewable 81 milliGRAM(s) Oral daily  atorvastatin 40 milliGRAM(s) Oral at bedtime  glucagon  Injectable 1 milliGRAM(s) IntraMuscular once PRN  heparin  Infusion. 1200 Unit(s)/Hr IV Continuous <Continuous>  insulin glargine Injectable (LANTUS) 35 Unit(s) SubCutaneous at bedtime  insulin lispro (HumaLOG) corrective regimen sliding scale   SubCutaneous three times a day before meals  insulin lispro (HumaLOG) corrective regimen sliding scale   SubCutaneous at bedtime  insulin lispro Injectable (HumaLOG) 7 Unit(s) SubCutaneous three times a day before meals  metoprolol tartrate 25 milliGRAM(s) Oral every 12 hours  oxyCODONE    IR 5 milliGRAM(s) Oral every 4 hours PRN  oxyCODONE    IR 10 milliGRAM(s) Oral every 4 hours PRN  sodium chloride 0.9%. 1000 milliLiter(s) IV Continuous <Continuous>    Allergies  No Known Allergies    Vital Signs Last 24 Hrs  T(C): 36.7 (2018 09:33), Max: 36.7 (2018 09:33)  T(F): 98.1 (2018 09:33), Max: 98.1 (2018 09:33)  HR: 70 (2018 09:33) (70 - 78)  BP: 130/58 (2018 09:33) (116/67 - 157/54)  RR: 18 (2018 09:33) (17 - 19)  SpO2: 100% (2018 09:33) (100% - 100%)  POCT Blood Glucose.: 177 mg/dL (2018 08:52)    Daily Weight in k.7 (2018 01:38)    PHYSICAL EXAM:  patient in no acute respiratory distress.  Constitutional: Comfortable. Awake and alert  Eyes: PERRL EOMI. No discharge.  ENMT: No sinus tenderness.  No pharyngeal erythema.   Neck: Supple. No thyromegaly   Respiratory:  air entry bilaterally, no wheezes, rhonchi, or crackles  Cardiovascular:S1 S2 wnl, No murmurs  Gastrointestinal: Soft, no tenderness , bowel sounds present,   Genitourinary: No suprapubic tenderness. no CVA tenderness   Musculoskeletal: right hallux nail absent, no erythema, no tenderness, no drainage - has numbness   some purple bruise like color on ventral surface of 2nd toe   Vascular: peripheral pulses felt  Neurological: No grossly focal deficits  Skin: No rash   Lymph Nodes: No palpable Lymphadenopathy   Psychiatric: Affect normal  Lines:                         10.2   4.64  )-----------( 218      ( 2018 06:15 )             33.0     WBC Count: 4.64 ( @ 06:15)  WBC Count: 4.61 ( @ 05:16)  WBC Count: 5.01 ( @ 05:30)  WBC Count: 6.12 ( @ 13:57)  WBC Count: 5.43 ( @ 05:30)  WBC Count: 7.13 ( @ 17:09)        136  |  103  |  23  ----------------------------<  216<H>  4.5   |  22  |  1.38<H>    Ca    8.8      2018 06:15  Phos  3.4       Mg     1.8           PTT - ( 2018 06:15 )  PTT:61.0 SEC    Lactate, Blood: 0.7 mmol/L (18 @ 17:49)    MICROBIOLOGY:    Culture - Blood in AM (18 @ 17:03)    Specimen Source: .Blood Blood-Peripheral    Culture Results:   No growth at 5 days.    Culture - Blood (18 @ 00:57)    Specimen Source: .Blood Blood-Peripheral    Culture Results:   No growth at 5 days.    v  RADIOLOGY:    < from: MR Foot w/wo IV Cont, Right (18 @ 22:34) >  There is diffuse bone marrow edema and enhancement throughout the distal   phalanx of the great toe with subtle patchy low T1 signal abnormality and   postcontrast enhancement. This appears compatible with osteomyelitis.   There is mild marrow edema and enhancement in the distal aspect of the   distal phalanx of the second toe without definitive T1 signal   abnormality. This may reflect early osteomyelitis. There is prominent   soft tissue swelling and enhancement within the soft tissues surrounding   both the first and second toes.    There is no osteomyelitis noted elsewhere.    There is diffuse mild dorsal subcutaneous soft tissue edema with minimal   enhancement consistent with cellulitis.    There is no soft tissue abscess identified.    Mild nonspecific intramuscular edema is seen within the intrinsic muscles   of the midfoot and forefoot.    IMPRESSION:Osteomyelitis of the distal phalanx of the great toe.   Suspected early osteomyelitis within the distal phalanx of the second toe.  < from: CT Angio Abd Aorta w/run-off w/ IV Cont (18 @ 21:09) >    ABDOMEN AND PELVIS ARTERIAL SYSTEM:     ABDOMINAL AORTA: Widely patent.  CELIAC ARTERY: Widely patent. A replaced right hepatic artery arising   from the left gastric artery.  SUPERIOR MESENTERIC ARTERY (SMA): Widely patent.  INFERIOR MESENTERIC ARTERY: Patent.  RIGHT RENAL ARTERY: Mild calcifications of the origin but otherwise   patent.  LEFT RENAL ARTERY: Widely patent.    RIGHT LOWER EXTREMITY:     COMMON ILIAC ARTERY: Mild atherosclerotic disease. Patent.  EXTERNAL ILIAC ARTERY: Mild atherosclerotic disease. Patent.  INTERNAL ILIAC ARTERY: At least moderate atherosclerotic disease. Patent.  COMMON FEMORAL ARTERY: Widely patent.   FEMORAL ARTERY: Mild multilevel calcifications but otherwise patent.   PROFUNDA FEMORAL ARTERY: Patent.  POPLITEAL ARTERY: Widely patent.  ANTERIOR TIBIAL ARTERY: Widely patent.  TIBIOPERONEAL TRUNK: Widely patent.  POSTERIOR TIBIAL ARTERY: Mild to moderate atherosclerotic disease   diffusely and is not well evaluated.  PERONEAL ARTERY: Mild atherosclerotic disease but patent.    LEFT LOWER EXTREMITY:    COMMON ILIAC ARTERY: Widely patent.  EXTERNAL ILIAC ARTERY: Widely patent.  INTERNAL ILIAC ARTERY: Scattered moderate atherosclerotic disease. Patent.  COMMON FEMORAL ARTERY: Widely patent.  FEMORAL ARTERY: Mild to moderate atherosclerotic disease diffusely but   otherwise patent.  PROFUNDA FEMORAL ARTERY: Widely patent.  POPLITEAL ARTERY: Widely patent.  ANTERIOR TIBIAL ARTERY: Widely patent.  TIBIOPERONEAL TRUNK: Widely patent.  POSTERIOR TIBIAL ARTERY: Mild to moderate atherosclerotic disease   diffusely and is not well evaluated.  PERONEAL ARTERY: Widely patent.    ADDITIONAL FINDINGS: Very mild groundglass opacities in bilateral lower   lobes. There is a 4.6 cm left renal cyst. Subcentimeter right renal   hypodensities are too small characterize. Status post hysterectomy. No   adnexal mass.    IMPRESSION: Multilevel atherosclerotic disease diffusely involving the   posterior tibial arteries bilaterally,which are not well evaluated and   may be occluded. There is distal reconstitution on the left at the level   of the plantar arch. Otherwise two-vessel runoff in bilateral lower   extremities.    < from: Xray Foot AP + Lateral + Oblique, Right (18 @ 17:18) >  There is a possible region of focal lucency with cortical erosion at the   lateral aspect of the tuft of the right first distal phalanx, suggestive   of osteomyelitis.    The visualized joint spaces are unremarkable.        < from: Transthoracic Echocardiogram (04.22.18 @ 08:27) >  1. Moderate segmental left ventricular systolic  dysfunction. The inferior and inferolateral walls are  severely hypokinetic.  2. Normal right ventricular size and function.    < from: VA Duplex Physiol Ext Low 3+ Level, BI. (. @ 11:00) >  1.  Right RAMIRO is normal (1.02).  Right TBI is moderately  decreased (0.50).  The right toe pressure is 62 mmHg.  Waveform analysis suggests the presence of small vessel  arterial disease in the right foot.  2. Left RAMIRO is normal (1.03).  Left TBI is moderately  decreased (0.48). The left toe pressure is 59 mmHg.  Although the TBI is decreased, waveforms in the left toes  appear normal.

## 2018-04-23 NOTE — PROGRESS NOTE ADULT - ASSESSMENT
78 year old female RIGHT foot hallux and 2nd digit discoloration, non-infected  - Pt was examined and evaluated  - XR read as possible OM, does not correlate clinically & spoke with radiology has not changed significantly from prior XR image from 1 week ago  - MRI showing OM of distal hallux and poss OM distal 2nd toe  - would elect to attempt longterm abx prior to sx intervention  - rec ID consult to determine abx course/PICC  - no vascular plan for intervention  - Plan for local wound care only at this time  - discussed w/ attending

## 2018-04-23 NOTE — PROGRESS NOTE ADULT - ASSESSMENT
Cad history of cabg and stent  cont asa  cont statin  awaiting stress test    HTN  stable   cont current meds    DM  Monitor finger stick. Insulin coverage. Diabetic education and Diabetic diet. Consider nutrition consultation.     Preop  Based on current ACC/AHA guidelines, patient history and physical exam, the patient is considered to have high risk  may proceed for podiatry surgery under local  awaiting stress test for possible higher risk surgery     Systolic CHF  chronic  ischemic CM  cont BB  off pletal  will eventually resume ace/arb  diuresis PRN  DC IVF or lower rate

## 2018-04-23 NOTE — CONSULT NOTE ADULT - ASSESSMENT
78 female with DM, CAD s/p CABG, stent and MI, PAD, HTN, HLD on anticoagulation here for Right foot 1st and 2nd toe gangrene. MRI positive for osteomyelitis. no interventions per vascular and podiatry at this time.   NUBIA improving from Cr 1.83 to 1.38.   CrCL 44    Osteomyelitis of the right 1st and second toe with gangrene  have no culture data  bone culture would be helpful   check blood cultures   continue Vancomycin  monitor Vancomycin trough  continue Zosyn 78 female with DM, CAD s/p CABG, stent and MI, PAD, HTN, HLD on anticoagulation here for Right foot 1st and 2nd toe gangrene. MRI positive for osteomyelitis. no interventions per vascular and podiatry at this time.   NUBIA improving from Cr 1.83 to 1.38.   CrCL 44    Osteomyelitis of the right 1st and second toe with gangrene  have no culture data  bone culture would be helpful   check blood cultures   discontinue vancomycin and zosyn  start ciprofloxacin 500mg q12  start doxycycline 100mg q12  monitor ESR and CRP  will need antibiotics for at least 6 weeks  needs to follow up at the ID office 78 female with DM, CAD s/p CABG, stent and MI, PAD, HTN, HLD on anticoagulation here for Right foot 1st and 2nd toe gangrene. MRI positive for osteomyelitis. no interventions per vascular and podiatry at this time.   NUBIA improving from Cr 1.83 to 1.38.   CrCL 44    Osteomyelitis of the right 1st and second toe with gangrene  No culture data available  If pursuing bone culture would send for cx   F/U blood cultures   Continue vancomycin and zosyn.  Monitor vanco trough.  Can transition to oral - ciprofloxacin 500mg PO q12 and doxycycline 100mg PO q12  Monitor ESR and CRP weekly  Will need antibiotics for at least 6 weeks  Upon discharge, will need to follow up in the ID office in 1-2 weeks. For appointments can call [phone].

## 2018-04-23 NOTE — PROGRESS NOTE ADULT - ASSESSMENT
78 year old lady with R 1st and 2nd toe gangrene, likely 2/2 PAD:   - Tolerating diet  - Pain control  - RAMIRO/PVR results noted; flows OK w/ no need for vasc intervention at this time  - Cards reccs appreciated; ECHO done/Stress test to be finalized today  - MRI results noted  - Pods reccs appreciated; will f/u with official plan   - Cont IV abx  - F/u labs  - Cont hep gtt, pt with hx of A Fib (Takes Eliquis at home)

## 2018-04-23 NOTE — PROGRESS NOTE ADULT - ASSESSMENT
77 yo F w/ IDDM, HTN, CAD s/p stents and CABG presents w/ R 1st and 2nd toe gangrene    #R 1st &2nd toe gangrene   - on ASA/Pletal  - on heparin gtt  - on Vanc/Zosyn  - per Vascular, no need for vasc intervention, podiatric intervention TBD   - awaiting MRI foot     #NUBIA  -prerenal picture, pt received CTA   -will start IVF @ 75 cc/hr  -avoid nephrotoxins  -improving, c/w fluids for now     #CAD s/p stents/CABG   - Cardiology following  - per primary team, stress test abnormal, to go for cardiac cath tomorrow  - c/w ASA, statin    #HTN   - c/w norvasc, metoprolol  - hold losartan in setting of NBUIA     #IDDM  - pt reports she takes Levemir 70 u Qam and 56 u qPM at home   - Hga1c 7.6 earlier this month, well controlled   - FS elevated here on lowered regimen, will uptitrate regimen     #DVT ppx  - already on systemic a/c w/ hep gtt

## 2018-04-24 ENCOUNTER — TRANSCRIPTION ENCOUNTER (OUTPATIENT)
Age: 78
End: 2018-04-24

## 2018-04-24 LAB
APTT BLD: 60.4 SEC — HIGH (ref 27.5–37.4)
BLD GP AB SCN SERPL QL: NEGATIVE — SIGNIFICANT CHANGE UP
HCT VFR BLD CALC: 32.6 % — LOW (ref 34.5–45)
HGB BLD-MCNC: 10.2 G/DL — LOW (ref 11.5–15.5)
MCHC RBC-ENTMCNC: 24.9 PG — LOW (ref 27–34)
MCHC RBC-ENTMCNC: 31.3 % — LOW (ref 32–36)
MCV RBC AUTO: 79.7 FL — LOW (ref 80–100)
NRBC # FLD: 0 — SIGNIFICANT CHANGE UP
PLATELET # BLD AUTO: 230 K/UL — SIGNIFICANT CHANGE UP (ref 150–400)
PMV BLD: 10.8 FL — SIGNIFICANT CHANGE UP (ref 7–13)
RBC # BLD: 4.09 M/UL — SIGNIFICANT CHANGE UP (ref 3.8–5.2)
RBC # FLD: 16 % — HIGH (ref 10.3–14.5)
RH IG SCN BLD-IMP: POSITIVE — SIGNIFICANT CHANGE UP
SPECIMEN SOURCE: SIGNIFICANT CHANGE UP
WBC # BLD: 4.56 K/UL — SIGNIFICANT CHANGE UP (ref 3.8–10.5)
WBC # FLD AUTO: 4.56 K/UL — SIGNIFICANT CHANGE UP (ref 3.8–10.5)

## 2018-04-24 PROCEDURE — 99231 SBSQ HOSP IP/OBS SF/LOW 25: CPT

## 2018-04-24 PROCEDURE — 76937 US GUIDE VASCULAR ACCESS: CPT | Mod: 26

## 2018-04-24 PROCEDURE — 93458 L HRT ARTERY/VENTRICLE ANGIO: CPT | Mod: 26

## 2018-04-24 RX ORDER — CIPROFLOXACIN LACTATE 400MG/40ML
500 VIAL (ML) INTRAVENOUS EVERY 12 HOURS
Qty: 0 | Refills: 0 | Status: DISCONTINUED | OUTPATIENT
Start: 2018-04-24 | End: 2018-04-25

## 2018-04-24 RX ORDER — INSULIN LISPRO 100/ML
VIAL (ML) SUBCUTANEOUS AT BEDTIME
Qty: 0 | Refills: 0 | Status: DISCONTINUED | OUTPATIENT
Start: 2018-04-24 | End: 2018-04-25

## 2018-04-24 RX ORDER — CILOSTAZOL 100 MG/1
50 TABLET ORAL
Qty: 0 | Refills: 0 | Status: DISCONTINUED | OUTPATIENT
Start: 2018-04-24 | End: 2018-04-25

## 2018-04-24 RX ORDER — APIXABAN 2.5 MG/1
5 TABLET, FILM COATED ORAL
Qty: 0 | Refills: 0 | Status: DISCONTINUED | OUTPATIENT
Start: 2018-04-24 | End: 2018-04-25

## 2018-04-24 RX ORDER — INSULIN LISPRO 100/ML
VIAL (ML) SUBCUTANEOUS
Qty: 0 | Refills: 0 | Status: DISCONTINUED | OUTPATIENT
Start: 2018-04-24 | End: 2018-04-25

## 2018-04-24 RX ADMIN — Medication 500 MILLIGRAM(S): at 19:06

## 2018-04-24 RX ADMIN — INSULIN GLARGINE 35 UNIT(S): 100 INJECTION, SOLUTION SUBCUTANEOUS at 22:55

## 2018-04-24 RX ADMIN — Medication 25 MILLIGRAM(S): at 18:41

## 2018-04-24 RX ADMIN — CILOSTAZOL 50 MILLIGRAM(S): 100 TABLET ORAL at 19:06

## 2018-04-24 RX ADMIN — ATORVASTATIN CALCIUM 40 MILLIGRAM(S): 80 TABLET, FILM COATED ORAL at 22:56

## 2018-04-24 RX ADMIN — Medication 7 UNIT(S): at 18:54

## 2018-04-24 RX ADMIN — APIXABAN 5 MILLIGRAM(S): 2.5 TABLET, FILM COATED ORAL at 19:06

## 2018-04-24 RX ADMIN — AMLODIPINE BESYLATE 5 MILLIGRAM(S): 2.5 TABLET ORAL at 05:50

## 2018-04-24 RX ADMIN — Medication 81 MILLIGRAM(S): at 11:35

## 2018-04-24 RX ADMIN — SODIUM CHLORIDE 75 MILLILITER(S): 9 INJECTION INTRAMUSCULAR; INTRAVENOUS; SUBCUTANEOUS at 05:50

## 2018-04-24 RX ADMIN — Medication 25 MILLIGRAM(S): at 05:50

## 2018-04-24 RX ADMIN — PIPERACILLIN AND TAZOBACTAM 25 GRAM(S): 4; .5 INJECTION, POWDER, LYOPHILIZED, FOR SOLUTION INTRAVENOUS at 02:32

## 2018-04-24 RX ADMIN — Medication 100 MILLIGRAM(S): at 18:41

## 2018-04-24 RX ADMIN — HEPARIN SODIUM 600 UNIT(S)/HR: 5000 INJECTION INTRAVENOUS; SUBCUTANEOUS at 06:45

## 2018-04-24 NOTE — DISCHARGE NOTE ADULT - MEDICATION SUMMARY - MEDICATIONS TO TAKE
I will START or STAY ON the medications listed below when I get home from the hospital:    Tylenol  -- 650 milligram(s) by mouth every 6 hours  -- Indication: For Pain    aspirin 81 mg oral tablet  -- 1 tab(s) by mouth once a day  -- Indication: For Heart health    Percocet 5/325 oral tablet  -- 1 tab(s) by mouth every 6 hours MDD:4 tabs per day  -- Caution federal law prohibits the transfer of this drug to any person other  than the person for whom it was prescribed.  May cause drowsiness.  Alcohol may intensify this effect.  Use care when operating dangerous machinery.  This prescription cannot be refilled.  This product contains acetaminophen.  Do not use  with any other product containing acetaminophen to prevent possible liver damage.  Using more of this medication than prescribed may cause serious breathing problems.    -- Indication: For Pain    losartan 25 mg oral tablet  -- 1 tab(s) by mouth once a day  -- Indication: For HTN    losartan 25 mg oral tablet  -- 1 tab(s) by mouth once a day  -- Indication: For HTN    Eliquis 5 mg oral tablet  -- 1 tab(s) by mouth 2 times a day  -- Indication: For Atrial fibrillation    Lyrica 50 mg oral capsule  -- 1 cap(s) by mouth once a day (at bedtime)  -- Indication: For Nerve pain    Levemir 100 units/mL subcutaneous solution  -- 70 unit(s) subcutaneous once a day  -- Indication: For DM    Levemir 100 units/mL subcutaneous solution  -- 50 unit(s) subcutaneous once a day  -- Indication: For DM    Zetia 10 mg oral tablet  -- 1 tab(s) by mouth once a day  -- Indication: For HLD    atorvastatin 40 mg oral tablet  -- 1 tab(s) by mouth once a day (at bedtime)  -- Indication: For HLD    doxycycline monohydrate 100 mg oral capsule  -- 1 cap(s) by mouth every 12 hours  -- Indication: For Osteomyelitis    Metoprolol Succinate ER 25 mg oral tablet, extended release  -- 1 tab(s) by mouth once a day  -- Indication: For HTN    metoprolol succinate 25 mg oral tablet, extended release  -- 1 tab(s) by mouth once a day  -- Indication: For HTN    Dulera 200 mcg-5 mcg/inh inhalation aerosol  -- 2 puff(s) inhaled 2 times a day  -- Indication: For COPD    amLODIPine 5 mg oral tablet  -- 1 tab(s) by mouth once a day  -- Indication: For HTN    cilostazol 50 mg oral tablet  -- 1 tab(s) by mouth 2 times a day   -- Medication should be taken with plenty of water.  Take medication on an empty stomach 1 hour before or 2 to 3 hours after a meal unless otherwise directed by your doctor.    -- Indication: For Arterial insufficiency of lower extremity    ciprofloxacin 500 mg oral tablet  -- 1 tab(s) by mouth every 12 hours  -- Indication: For Osteomyelitis I will START or STAY ON the medications listed below when I get home from the hospital:    rolling walker  -- 1 unit(s)  4 times a day   -- Indication: For Ambulation    Rolling Walker  -- 1 unit  -- Indication: For Ambulation    Tylenol  -- 650 milligram(s) by mouth every 6 hours  -- Indication: For Pain    aspirin 81 mg oral tablet  -- 1 tab(s) by mouth once a day  -- Indication: For Heart health    Percocet 5/325 oral tablet  -- 1 tab(s) by mouth every 6 hours MDD:4 tabs per day  -- Caution federal law prohibits the transfer of this drug to any person other  than the person for whom it was prescribed.  May cause drowsiness.  Alcohol may intensify this effect.  Use care when operating dangerous machinery.  This prescription cannot be refilled.  This product contains acetaminophen.  Do not use  with any other product containing acetaminophen to prevent possible liver damage.  Using more of this medication than prescribed may cause serious breathing problems.    -- Indication: For Pain    losartan 25 mg oral tablet  -- 1 tab(s) by mouth once a day  -- Indication: For HTN    losartan 25 mg oral tablet  -- 1 tab(s) by mouth once a day  -- Indication: For HTN    Eliquis 5 mg oral tablet  -- 1 tab(s) by mouth 2 times a day  -- Indication: For Atrial fibrillation    Lyrica 50 mg oral capsule  -- 1 cap(s) by mouth once a day (at bedtime)  -- Indication: For Nerve pain    Levemir 100 units/mL subcutaneous solution  -- 70 unit(s) subcutaneous once a day  -- Indication: For DM    Levemir 100 units/mL subcutaneous solution  -- 50 unit(s) subcutaneous once a day  -- Indication: For DM    Zetia 10 mg oral tablet  -- 1 tab(s) by mouth once a day  -- Indication: For HLD    atorvastatin 40 mg oral tablet  -- 1 tab(s) by mouth once a day (at bedtime)  -- Indication: For HLD    doxycycline monohydrate 100 mg oral capsule  -- 1 cap(s) by mouth every 12 hours  -- Indication: For Osteomyelitis    Metoprolol Succinate ER 25 mg oral tablet, extended release  -- 1 tab(s) by mouth once a day  -- Indication: For HTN    metoprolol succinate 25 mg oral tablet, extended release  -- 1 tab(s) by mouth once a day  -- Indication: For HTN    Dulera 200 mcg-5 mcg/inh inhalation aerosol  -- 2 puff(s) inhaled 2 times a day  -- Indication: For COPD    amLODIPine 5 mg oral tablet  -- 1 tab(s) by mouth once a day  -- Indication: For HTN    cilostazol 50 mg oral tablet  -- 1 tab(s) by mouth 2 times a day   -- Medication should be taken with plenty of water.  Take medication on an empty stomach 1 hour before or 2 to 3 hours after a meal unless otherwise directed by your doctor.    -- Indication: For Arterial insufficiency of lower extremity    ciprofloxacin 500 mg oral tablet  -- 1 tab(s) by mouth every 12 hours  -- Indication: For Osteomyelitis I will START or STAY ON the medications listed below when I get home from the hospital:    rolling walker  -- 1 unit(s)  4 times a day   -- Indication: For Ambulation    Rolling Walker  -- 1 unit  -- Indication: For Ambulation    Tylenol  -- 650 milligram(s) by mouth every 6 hours  -- Indication: For Pain    aspirin 81 mg oral tablet  -- 1 tab(s) by mouth once a day  -- Indication: For Heart health    Percocet 5/325 oral tablet  -- 1 tab(s) by mouth every 6 hours MDD:4 tabs per day  -- Caution federal law prohibits the transfer of this drug to any person other  than the person for whom it was prescribed.  May cause drowsiness.  Alcohol may intensify this effect.  Use care when operating dangerous machinery.  This prescription cannot be refilled.  This product contains acetaminophen.  Do not use  with any other product containing acetaminophen to prevent possible liver damage.  Using more of this medication than prescribed may cause serious breathing problems.    -- Indication: For Pain    losartan 25 mg oral tablet  -- 1 tab(s) by mouth once a day  -- Indication: For HTN    Eliquis 5 mg oral tablet  -- 1 tab(s) by mouth 2 times a day  -- Indication: For Atrial fibrillation    Lyrica 50 mg oral capsule  -- 1 cap(s) by mouth once a day (at bedtime)  -- Indication: For Nerve pain    Levemir 100 units/mL subcutaneous solution  -- 70 unit(s) subcutaneous once a day  -- Indication: For DM    Levemir 100 units/mL subcutaneous solution  -- 50 unit(s) subcutaneous once a day  -- Indication: For DM    Zetia 10 mg oral tablet  -- 1 tab(s) by mouth once a day  -- Indication: For HLD    atorvastatin 40 mg oral tablet  -- 1 tab(s) by mouth once a day (at bedtime)  -- Indication: For HLD    doxycycline monohydrate 100 mg oral capsule  -- 1 cap(s) by mouth every 12 hours  -- Indication: For Osteomyelitis    metoprolol succinate 25 mg oral tablet, extended release  -- 1 tab(s) by mouth once a day  -- Indication: For HTN    Dulera 200 mcg-5 mcg/inh inhalation aerosol  -- 2 puff(s) inhaled 2 times a day  -- Indication: For COPD    amLODIPine 5 mg oral tablet  -- 1 tab(s) by mouth once a day  -- Indication: For HTN    cilostazol 50 mg oral tablet  -- 1 tab(s) by mouth 2 times a day   -- Medication should be taken with plenty of water.  Take medication on an empty stomach 1 hour before or 2 to 3 hours after a meal unless otherwise directed by your doctor.    -- Indication: For Arterial insufficiency of lower extremity    ciprofloxacin 500 mg oral tablet  -- 1 tab(s) by mouth every 12 hours  -- Indication: For Osteomyelitis I will START or STAY ON the medications listed below when I get home from the hospital:    Rolling Walker  -- 1 unit  -- Indication: For Ambulation    Tylenol  -- 650 milligram(s) by mouth every 6 hours  -- Indication: For Pain    aspirin 81 mg oral tablet  -- 1 tab(s) by mouth once a day  -- Indication: For Heart health    Percocet 5/325 oral tablet  -- 1 tab(s) by mouth every 6 hours MDD:4 tabs per day  -- Caution federal law prohibits the transfer of this drug to any person other  than the person for whom it was prescribed.  May cause drowsiness.  Alcohol may intensify this effect.  Use care when operating dangerous machinery.  This prescription cannot be refilled.  This product contains acetaminophen.  Do not use  with any other product containing acetaminophen to prevent possible liver damage.  Using more of this medication than prescribed may cause serious breathing problems.    -- Indication: For Pain    losartan 25 mg oral tablet  -- 1 tab(s) by mouth once a day  -- Indication: For HTN    Eliquis 5 mg oral tablet  -- 1 tab(s) by mouth 2 times a day  -- Indication: For Atrial fibrillation    Lyrica 50 mg oral capsule  -- 1 cap(s) by mouth once a day (at bedtime)  -- Indication: For Nerve pain    Levemir 100 units/mL subcutaneous solution  -- 70 unit(s) subcutaneous once a day  -- Indication: For DM    Levemir 100 units/mL subcutaneous solution  -- 50 unit(s) subcutaneous once a day  -- Indication: For DM    Zetia 10 mg oral tablet  -- 1 tab(s) by mouth once a day  -- Indication: For HLD    atorvastatin 40 mg oral tablet  -- 1 tab(s) by mouth once a day (at bedtime)  -- Indication: For HLD    doxycycline monohydrate 100 mg oral capsule  -- 1 cap(s) by mouth every 12 hours  -- Indication: For Osteomyelitis    metoprolol succinate 25 mg oral tablet, extended release  -- 1 tab(s) by mouth once a day  -- Indication: For HTN    Dulera 200 mcg-5 mcg/inh inhalation aerosol  -- 2 puff(s) inhaled 2 times a day  -- Indication: For COPD    amLODIPine 5 mg oral tablet  -- 1 tab(s) by mouth once a day  -- Indication: For HTN    cilostazol 50 mg oral tablet  -- 1 tab(s) by mouth 2 times a day   -- Medication should be taken with plenty of water.  Take medication on an empty stomach 1 hour before or 2 to 3 hours after a meal unless otherwise directed by your doctor.    -- Indication: For Arterial insufficiency of lower extremity    ciprofloxacin 500 mg oral tablet  -- 1 tab(s) by mouth every 12 hours  -- Indication: For Osteomyelitis

## 2018-04-24 NOTE — DISCHARGE NOTE ADULT - PATIENT PORTAL LINK FT
You can access the [facility] Portal, offered by [facility], by registering with the following website: [email or web address]

## 2018-04-24 NOTE — DISCHARGE NOTE ADULT - HOSPITAL COURSE
Patient presented to the ED on 4/19 complaining of a right foot wound. The patient was admitted previously for this wound, but did not have surgical intervention at that time. At the time of admission, the patient's right hallux and 2nd tod were discolored, but did not appear infected. MRI showed osteomyelitis of the hallux and potential osteomyelitis of the 2nd toe.     RAMIRO/PVR performed during admission and no vascular intervention was indicated.     Podiatry consulted on this patient and recommended antibiotics prior to surgical intervention.     Cardiology also saw this patient given her history of CAD, CABG, and stents. She was cleared for local anesthesia for podiatric intervention if needed. However, considered high risk.     Discharged on ciprofloxacin and doxycycline for osteomyelitis. On a heparin infusion during admission, but discharged home on Eliquis.

## 2018-04-24 NOTE — PROGRESS NOTE ADULT - ASSESSMENT
78 year old lady with R 1st and 2nd toe gangrene, likely 2/2 PAD, cath lab today:   - Tolerating diet  - Pain control  - RAMIRO/PVR results noted; flows OK w/ no need for vasc intervention at this time  - Cards reccs appreciated; heart cath today  - MRI results noted  - Pods reccs appreciated; will f/u with official plan   - Cont IV abx  - F/u labs  - Cont hep gtt, pt with hx of A Fib (Takes Eliquis at home)

## 2018-04-24 NOTE — DISCHARGE NOTE ADULT - HOME CARE AGENCY
[facility]- [phone]. This agency will send a nurse to evaluate your care at home.

## 2018-04-24 NOTE — PROGRESS NOTE ADULT - ASSESSMENT
Cad history of cabg and stent  cont asa  cont stati    HTN  stable   cont current meds    DM  Monitor finger stick. Insulin coverage. Diabetic education and Diabetic diet. Consider nutrition consultation.     Preop  Based on current ACC/AHA guidelines, patient history and physical exam, the patient is considered to have high risk  may proceed for podiatry surgery under local  stress test is abnormal, will get cath prior to OR for high risk surgery     Systolic CHF  chronic  ischemic CM  cont BB  off pletal  will eventually resume ace/arb  diuresis PRN  DC IVF

## 2018-04-24 NOTE — DISCHARGE NOTE ADULT - MEDICATION SUMMARY - MEDICATIONS TO STOP TAKING
I will STOP taking the medications listed below when I get home from the hospital:    Augmentin 875 mg-125 mg oral tablet  -- 875 milligram(s) by mouth every 12 hours   -- Finish all this medication unless otherwise directed by prescriber.  Take with food or milk.

## 2018-04-24 NOTE — DISCHARGE NOTE ADULT - CARE PROVIDERS DIRECT ADDRESSES
[email or web address]

## 2018-04-24 NOTE — PRE-OP CHECKLIST - SELECT TESTS ORDERED
CBC/POCT Blood Glucose/PT/PTT/Type and Screen/BMP/INR INR/BMP/POCT Blood Glucose/Type and Screen/QO=216 1140am/CBC/PT/PTT

## 2018-04-24 NOTE — DISCHARGE NOTE ADULT - CARE PLAN
Goal:	wound healing  Assessment and plan of treatment:	- apply betadine to right hallux and 2nd toe eschars daily  - antibiotics as per ID recommendations  - weightbearing as tolerated   - Follow up with Dr. Pedro within 1 week of discharge (call  to make appointment) Principal Discharge DX:	Osteomyelitis  Goal:	wound healing  Assessment and plan of treatment:	- apply betadine to right hallux and 2nd toe eschars daily  - antibiotics as per ID recommendations  - weightbearing as tolerated   - Follow up with Dr. Pedro within 1 week of discharge (call  to make appointment)

## 2018-04-24 NOTE — PROGRESS NOTE ADULT - ASSESSMENT
78 year old female RIGHT foot hallux and 2nd digit discoloration, non-infected  - Pt was examined and evaluated  - MRI showing OM of distal hallux and poss OM distal 2nd toe  - Would elect to attempt longterm abx prior to sx intervention  - Antibiotics per ID  - No vascular plan for intervention  - Plan for local wound care only at this time  - Plan for card cath today   - Will continue to monitor appearance

## 2018-04-24 NOTE — DISCHARGE NOTE ADULT - CARE PROVIDER_API CALL
Jossie Nation), Surgery  [address]  [address]  [place] [address]  Phone: [phone]  Fax: [phone] Adrien Palacios (ALISON), Podiatric Medicine and Surgery  [address] [place] [address]  Phone: [phone]  Fax: [phone]

## 2018-04-24 NOTE — DISCHARGE NOTE ADULT - PLAN OF CARE
wound healing - apply betadine to right hallux and 2nd toe eschars daily  - antibiotics as per ID recommendations  - weightbearing as tolerated   - Follow up with Dr. Pedro within 1 week of discharge (call  to make appointment)

## 2018-04-24 NOTE — DISCHARGE NOTE ADULT - ADDITIONAL INSTRUCTIONS
WOUND CARE: follow instructions provided by podiatry.   BATHING: Please do not submerge wound underwater. You may shower and/or sponge bathe.  ACTIVITY: No heavy lifting or straining. Otherwise, you may return to your usual level of physical activity. If you are taking narcotic pain medication (such as Percocet), do NOT drive a car, operate machinery or make important decisions.  DIET: Return to your usual diet.  NOTIFY YOUR SURGEON IF: You have any bleeding that does not stop, any pus draining from your wound, any fever (over 100.4 F) or chills, persistent nausea/vomiting, persistent diarrhea, or if your pain is not controlled on your discharge pain medications.  FOLLOW-UP:  1. Please call to make a follow-up appointment within one week of discharge with podiatry.   2. Please follow up with your primary care physician in one week regarding your hospitalization.

## 2018-04-24 NOTE — DISCHARGE NOTE ADULT - DURABLE MEDICAL EQUIPMENT AGENCY
[facility]- [phone]. This agency will deliver a Rolling Walker to your home. Please call agency if you do not receive it 1-2 days.

## 2018-04-25 VITALS
RESPIRATION RATE: 19 BRPM | DIASTOLIC BLOOD PRESSURE: 74 MMHG | SYSTOLIC BLOOD PRESSURE: 129 MMHG | OXYGEN SATURATION: 100 % | TEMPERATURE: 98 F | HEART RATE: 71 BPM

## 2018-04-25 LAB — SPECIMEN SOURCE: SIGNIFICANT CHANGE UP

## 2018-04-25 PROCEDURE — 99233 SBSQ HOSP IP/OBS HIGH 50: CPT

## 2018-04-25 PROCEDURE — 99232 SBSQ HOSP IP/OBS MODERATE 35: CPT

## 2018-04-25 RX ORDER — CIPROFLOXACIN LACTATE 400MG/40ML
1 VIAL (ML) INTRAVENOUS
Qty: 0 | Refills: 0 | COMMUNITY
Start: 2018-04-25

## 2018-04-25 RX ORDER — APIXABAN 2.5 MG/1
1 TABLET, FILM COATED ORAL
Qty: 30 | Refills: 0
Start: 2018-04-25

## 2018-04-25 RX ORDER — APIXABAN 2.5 MG/1
1 TABLET, FILM COATED ORAL
Qty: 0 | Refills: 0 | COMMUNITY

## 2018-04-25 RX ORDER — LOSARTAN POTASSIUM 100 MG/1
1 TABLET, FILM COATED ORAL
Qty: 0 | Refills: 0 | COMMUNITY

## 2018-04-25 RX ORDER — CIPROFLOXACIN LACTATE 400MG/40ML
1 VIAL (ML) INTRAVENOUS
Qty: 84 | Refills: 0
Start: 2018-04-25

## 2018-04-25 RX ORDER — METOPROLOL TARTRATE 50 MG
1 TABLET ORAL
Qty: 0 | Refills: 0 | COMMUNITY

## 2018-04-25 RX ADMIN — Medication 500 MILLIGRAM(S): at 05:52

## 2018-04-25 RX ADMIN — Medication 25 MILLIGRAM(S): at 05:52

## 2018-04-25 RX ADMIN — Medication 2: at 09:54

## 2018-04-25 RX ADMIN — Medication 81 MILLIGRAM(S): at 11:37

## 2018-04-25 RX ADMIN — CILOSTAZOL 50 MILLIGRAM(S): 100 TABLET ORAL at 05:52

## 2018-04-25 RX ADMIN — Medication 100 MILLIGRAM(S): at 05:52

## 2018-04-25 RX ADMIN — Medication 7 UNIT(S): at 14:13

## 2018-04-25 RX ADMIN — Medication 2: at 14:12

## 2018-04-25 RX ADMIN — APIXABAN 5 MILLIGRAM(S): 2.5 TABLET, FILM COATED ORAL at 17:49

## 2018-04-25 RX ADMIN — Medication 500 MILLIGRAM(S): at 17:49

## 2018-04-25 RX ADMIN — APIXABAN 5 MILLIGRAM(S): 2.5 TABLET, FILM COATED ORAL at 05:52

## 2018-04-25 RX ADMIN — Medication 7 UNIT(S): at 18:40

## 2018-04-25 RX ADMIN — CILOSTAZOL 50 MILLIGRAM(S): 100 TABLET ORAL at 17:49

## 2018-04-25 RX ADMIN — Medication 7 UNIT(S): at 09:54

## 2018-04-25 RX ADMIN — Medication 25 MILLIGRAM(S): at 17:49

## 2018-04-25 RX ADMIN — Medication 100 MILLIGRAM(S): at 17:49

## 2018-04-25 RX ADMIN — AMLODIPINE BESYLATE 5 MILLIGRAM(S): 2.5 TABLET ORAL at 05:52

## 2018-04-25 NOTE — PROGRESS NOTE ADULT - ASSESSMENT
78 female with DM, CAD s/p CABG, stent and MI, PAD, HTN, HLD on anticoagulation here for Right foot 1st and 2nd toe gangrene. MRI positive for osteomyelitis. no interventions per vascular and podiatry at this time.   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  Upon discharge, will need to follow up in the ID office in 1-2 weeks. For appointments can call [phone].

## 2018-04-25 NOTE — PROGRESS NOTE ADULT - ASSESSMENT
Cad history stent  cont asa  cont statin     HTN  stable   cont current meds    DM  Monitor finger stick. Insulin coverage. Diabetic education and Diabetic diet. Consider nutrition consultation.     Preop  Based on current ACC/AHA guidelines, patient history and physical exam, the patient is considered to have high risk  cath shows OM disease   no significant LAD disease  may proceed to OR as planned  cont asa during the perioperative period      Systolic CHF  chronic  ischemic CM  cont BB  off pletal  will eventually resume ace/arb  diuresis PRN  DC IVF

## 2018-04-25 NOTE — PROGRESS NOTE ADULT - ASSESSMENT
78 year old lady with R 1st and 2nd toe gangrene, likely 2/2 PAD, cath lab today:   - Tolerating diet  - Pain control  - RAMIRO/PVR results noted; no vasc intervention indicated  - Cards reccs appreciated; medically management of blockage noted on heart cath  - MRI results noted  - Pods reccs appreciated; management of OM w/ longterm abx  - F/u ID in regards to abx plan  - Cont IV abx  - F/u labs  - Cont hep gtt, pt with hx of A Fib (Takes Eliquis at home)

## 2018-04-25 NOTE — PROGRESS NOTE ADULT - ASSESSMENT
77 yo F w/ IDDM, HTN, CAD s/p stents and CABG presents w/ R 1st and 2nd toe gangrene    #R 1st &2nd toe gangrene   - on ASA/Pletal  - on Vanc/Zosyn  - per Vascular, no need for vasc intervention  - plan for outpatient abx per surgical team    #NUBIA  -prerenal picture, pt received CTA   -will start IVF @ 75 cc/hr  -avoid nephrotoxins  -improving, c/w fluids for now     #CAD s/p stents/CABG   - Cardiology following  - stress abnormal, s/p cardiac cath with instent thrombosis   - c/w ASA, statin  - appreciate cardiology recs  - patient has cardiologist as her PCP - f/u with cardiology after d/c    #HTN   - c/w norvasc, metoprolol  - continue current regimen on D/C  - f/u with her cardiologist after d/c as mentioned above    #IDDM  - Hga1c 7.6 earlier this month  - pt reports she takes Levemir 70 u Qam and 56 u qPM at home - very high doses - Novolog correction scale  - while in hospice insulin requirement much lower 35 U Lantus, 7 u Humalog with meals, likely due to decreased carb intake  - d/c home on Levemir 20 U BID, Novolog 7 U with meals  - patient has endocrinologist, with whom she should f/u ASAP  - advised patient to check fingersticks, avoid hypoglycemia    #DVT ppx  - already on systemic a/c w/ hep gtt     Thanks for the consult, hospitalist will follow with you.

## 2018-04-25 NOTE — PROGRESS NOTE ADULT - SUBJECTIVE AND OBJECTIVE BOX
VASCULAR DAILY PROGRESS NOTE:     Subjective: Pt seen this AM. NAEO. S/p jose de jesus cath yesterday, tolerated well. Tolerating PO intake. Remains afebrile.         Objective:    PE:  Gen: NAD  Resp: respirations unlabored  CVS: RRR  Abdomen: soft, nontender, nondistended  Extremities: no edema  Skin: warm, dry, appropriate color, dry gangrene with betadine over right 1st and 2nd toes, AT and popliteal signals bilaterally, palpable femoral pulses bilaterally      Vital Signs Last 24 Hrs  T(C): 36.7 (2018 05:49), Max: 36.8 (2018 11:36)  T(F): 98.1 (2018 05:49), Max: 98.3 (2018 11:36)  HR: 81 (2018 05:49) (57 - 99)  BP: 126/57 (2018 05:49) (110/59 - 152/80)  BP(mean): --  RR: 18 (2018 05:49) (17 - 20)  SpO2: 100% (2018 05:49) (100% - 100%)    I&O's Detail    2018 07:01  -  2018 07:00  --------------------------------------------------------  IN:    heparin  Infusion.: 114 mL    IV PiggyBack: 250 mL    sodium chloride 0.9%: 1350 mL  Total IN: 1714 mL    OUT:    Voided: 2550 mL  Total OUT: 2550 mL    Total NET: -836 mL      2018 07:01  -  2018 06:05  --------------------------------------------------------  IN:    heparin  Infusion.: 30 mL  Total IN: 30 mL    OUT:    Voided: 1525 mL  Total OUT: 1525 mL    Total NET: -1495 mL          Daily     Daily Weight in k.1 (2018 01:17)    MEDICATIONS  (STANDING):  amLODIPine   Tablet 5 milliGRAM(s) Oral daily  apixaban 5 milliGRAM(s) Oral <User Schedule>  aspirin  chewable 81 milliGRAM(s) Oral daily  atorvastatin 40 milliGRAM(s) Oral at bedtime  cilostazol 50 milliGRAM(s) Oral two times a day  ciprofloxacin     Tablet 500 milliGRAM(s) Oral every 12 hours  dextrose 50% Injectable 12.5 Gram(s) IV Push once  dextrose 50% Injectable 25 Gram(s) IV Push once  dextrose 50% Injectable 25 Gram(s) IV Push once  doxycycline hyclate Capsule 100 milliGRAM(s) Oral every 12 hours  insulin glargine Injectable (LANTUS) 35 Unit(s) SubCutaneous at bedtime  insulin lispro (HumaLOG) corrective regimen sliding scale   SubCutaneous three times a day before meals  insulin lispro (HumaLOG) corrective regimen sliding scale   SubCutaneous at bedtime  insulin lispro Injectable (HumaLOG) 7 Unit(s) SubCutaneous three times a day before meals  metoprolol tartrate 25 milliGRAM(s) Oral every 12 hours    MEDICATIONS  (PRN):  dextrose Gel 1 Dose(s) Oral once PRN Blood Glucose LESS THAN 70 milliGRAM(s)/deciliter  glucagon  Injectable 1 milliGRAM(s) IntraMuscular once PRN Glucose LESS THAN 70 milligrams/deciliter  oxyCODONE    IR 5 milliGRAM(s) Oral every 4 hours PRN Moderate Pain (4 - 6)  oxyCODONE    IR 10 milliGRAM(s) Oral every 4 hours PRN Severe Pain (7 - 10)      LABS:                        10.2   4.56  )-----------( 230      ( 2018 05:30 )             32.6         136  |  103  |  23  ----------------------------<  216<H>  4.5   |  22  |  1.38<H>    Ca    8.8      2018 06:15  Phos  3.4     -23  Mg     1.8           PTT - ( 2018 05:30 )  PTT:60.4 SEC      RADIOLOGY & ADDITIONAL STUDIES:
CC: Patient is a 78y old  Female who presents with a chief complaint of referred by her Vascular Surgeon for gangrene of right toes     Interval History/ROS: Patient remains with pain in her right toes. Denies fever, chills.    Allergies  No Known Allergies    ANTIMICROBIALS:  ciprofloxacin     Tablet 500 every 12 hours  doxycycline hyclate Capsule 100 every 12 hours    PE:    Vital Signs Last 24 Hrs  T(C): 36.6 (25 Apr 2018 14:40), Max: 36.7 (25 Apr 2018 01:17)  T(F): 97.8 (25 Apr 2018 14:40), Max: 98.1 (25 Apr 2018 05:49)  HR: 85 (25 Apr 2018 14:40) (57 - 85)  BP: 112/51 (25 Apr 2018 14:40) (109/51 - 152/80)  BP(mean): --  RR: 20 (25 Apr 2018 14:40) (18 - 20)  SpO2: 96% (25 Apr 2018 14:40) (96% - 100%)    Gen: AOx3, NAD, non-toxic, pleasant  CV: S1+S2 normal, no murmurs  Resp: Clear bilat, no resp distress, no crackles/wheezes  Abd: Soft, nontender, +BS  Ext: right hallux nail absent, swelling of toe  : No Youngblood  IV/Skin: No thrombophlebitis  Neuro: no focal deficits    LABS:                          10.2   4.56  )-----------( 230      ( 24 Apr 2018 05:30 )             32.6       MICROBIOLOGY:  v  BLOOD PERIPHERAL  04-24-18 --  --  --      BLOOD  04-23-18 --  --  --      .Blood Blood-Peripheral  04-14-18   No growth at 5 days.  --  --      .Blood Blood-Peripheral  04-14-18   No growth at 5 days.  --  --    RADIOLOGY:    No new images.
CHIEF COMPLAINT: Patient is a 78y old  female who presents with a chief complaint of referred by her Vascular Surgeon for gangrene of right toes (19 Apr 2018 19:20)      SUBJECTIVE / OVERNIGHT EVENTS:    Denies any physical complaints. Upset about several events earlier. Denies CP. Denies SOB. Denies foot pain - foot is numb.    MEDICATIONS  (STANDING):  amLODIPine   Tablet 5 milliGRAM(s) Oral daily  aspirin  chewable 81 milliGRAM(s) Oral daily  atorvastatin 40 milliGRAM(s) Oral at bedtime  dextrose 50% Injectable 12.5 Gram(s) IV Push once  dextrose 50% Injectable 25 Gram(s) IV Push once  dextrose 50% Injectable 25 Gram(s) IV Push once  heparin  Infusion. 1200 Unit(s)/Hr (12 mL/Hr) IV Continuous <Continuous>  insulin glargine Injectable (LANTUS) 35 Unit(s) SubCutaneous at bedtime  insulin lispro (HumaLOG) corrective regimen sliding scale   SubCutaneous three times a day before meals  insulin lispro (HumaLOG) corrective regimen sliding scale   SubCutaneous at bedtime  insulin lispro Injectable (HumaLOG) 7 Unit(s) SubCutaneous three times a day before meals  metoprolol tartrate 25 milliGRAM(s) Oral every 12 hours  piperacillin/tazobactam IVPB. 3.375 Gram(s) IV Intermittent every 8 hours  sodium chloride 0.9%. 1000 milliLiter(s) (75 mL/Hr) IV Continuous <Continuous>  vancomycin  IVPB 1250 milliGRAM(s) IV Intermittent every 24 hours    MEDICATIONS  (PRN):  dextrose Gel 1 Dose(s) Oral once PRN Blood Glucose LESS THAN 70 milliGRAM(s)/deciliter  glucagon  Injectable 1 milliGRAM(s) IntraMuscular once PRN Glucose LESS THAN 70 milligrams/deciliter  oxyCODONE    IR 5 milliGRAM(s) Oral every 4 hours PRN Moderate Pain (4 - 6)  oxyCODONE    IR 10 milliGRAM(s) Oral every 4 hours PRN Severe Pain (7 - 10)      VITALS:  T(F): 97.3 (04-23-18 @ 13:52), Max: 98.1 (04-23-18 @ 09:33)  HR: 71 (04-23-18 @ 13:52) (70 - 76)  BP: 151/70 (04-23-18 @ 13:52) (116/67 - 157/54)  RR: 16 (04-23-18 @ 13:52) (16 - 18)  SpO2: 96% (04-23-18 @ 13:52)      CAPILLARY BLOOD GLUCOSE    Output     I&O's Summary  T(F): 97.3 (04-23-18 @ 13:52), Max: 98.1 (04-23-18 @ 09:33)  HR: 71 (04-23-18 @ 13:52) (70 - 76)  BP: 151/70 (04-23-18 @ 13:52) (116/67 - 157/54)  RR: 16 (04-23-18 @ 13:52) (16 - 18)  SpO2: 96% (04-23-18 @ 13:52)    PHYSICAL EXAM:  GENERAL: NAD, well-developed  HEAD:  Atraumatic, Normocephalic  EYES: EOMI  NECK: Supple, No JVD  CHEST/LUNG: nonlabored breathing  HEART: nl S1/S2  ABDOMEN: nondistended, soft  EXTREMITIES:  R foot in dressing, 2nd toe visible with dusky discoloration of distal portion  PSYCH: alert, answering questions appropriately  NEUROLOGY: non-focal  SKIN: No rashes noted    LABS:              10.2                 136  | 22   | 23           4.64  >-----------< 218     ------------------------< 216                   33.0                 4.5  | 103  | 1.38                                         Ca 8.8   Mg 1.8   Ph 3.4          INR: x    ;    PT: x    ;    PTT: 61.0 SEC<H>                MICROBIOLOGY:        RADIOLOGY & ADDITIONAL TESTS:    Imaging Personally Reviewed:    < from: MR Foot w/wo IV Cont, Right (04.22.18 @ 22:34) >    IMPRESSION:Osteomyelitis of the distal phalanx of the great toe.   Suspected early osteomyelitis within the distal phalanx of the second toe.    < end of copied text >      [ ] Consultant(s) Notes Reviewed:    [ x ] Care Discussed with Consultants/Other Providers: vascular
CHIEF COMPLAINT: Patient is a 78y old  female who presents with a chief complaint of referred by her Vascular Surgeon for gangrene of right toes (24 Apr 2018 20:32)      SUBJECTIVE / OVERNIGHT EVENTS:    Feeling well. Denies any complaints today. Per patient she is being discharged today.    MEDICATIONS  (STANDING):  amLODIPine   Tablet 5 milliGRAM(s) Oral daily  apixaban 5 milliGRAM(s) Oral <User Schedule>  aspirin  chewable 81 milliGRAM(s) Oral daily  atorvastatin 40 milliGRAM(s) Oral at bedtime  cilostazol 50 milliGRAM(s) Oral two times a day  ciprofloxacin     Tablet 500 milliGRAM(s) Oral every 12 hours  dextrose 50% Injectable 12.5 Gram(s) IV Push once  dextrose 50% Injectable 25 Gram(s) IV Push once  dextrose 50% Injectable 25 Gram(s) IV Push once  doxycycline hyclate Capsule 100 milliGRAM(s) Oral every 12 hours  insulin glargine Injectable (LANTUS) 35 Unit(s) SubCutaneous at bedtime  insulin lispro (HumaLOG) corrective regimen sliding scale   SubCutaneous three times a day before meals  insulin lispro (HumaLOG) corrective regimen sliding scale   SubCutaneous at bedtime  insulin lispro Injectable (HumaLOG) 7 Unit(s) SubCutaneous three times a day before meals  metoprolol tartrate 25 milliGRAM(s) Oral every 12 hours    MEDICATIONS  (PRN):  dextrose Gel 1 Dose(s) Oral once PRN Blood Glucose LESS THAN 70 milliGRAM(s)/deciliter  glucagon  Injectable 1 milliGRAM(s) IntraMuscular once PRN Glucose LESS THAN 70 milligrams/deciliter  oxyCODONE    IR 5 milliGRAM(s) Oral every 4 hours PRN Moderate Pain (4 - 6)  oxyCODONE    IR 10 milliGRAM(s) Oral every 4 hours PRN Severe Pain (7 - 10)      VITALS:  T(F): 97.5 (04-25-18 @ 11:21), Max: 98.1 (04-25-18 @ 05:49)  HR: 81 (04-25-18 @ 11:21) (57 - 81)  BP: 109/51 (04-25-18 @ 11:21) (109/51 - 152/80)  RR: 19 (04-25-18 @ 11:21) (18 - 20)  SpO2: 100% (04-25-18 @ 11:21)      CAPILLARY BLOOD GLUCOSE    Output     I&O's Summary  T(F): 97.5 (04-25-18 @ 11:21), Max: 98.1 (04-25-18 @ 05:49)  HR: 81 (04-25-18 @ 11:21) (57 - 81)  BP: 109/51 (04-25-18 @ 11:21) (109/51 - 152/80)  RR: 19 (04-25-18 @ 11:21) (18 - 20)  SpO2: 100% (04-25-18 @ 11:21)    PHYSICAL EXAM:  GENERAL: NAD, well-developed  HEAD:  Atraumatic, Normocephalic  EYES: EOMI  NECK: Supple, No JVD  CHEST/LUNG: nonlabored breathing  HEART: nl S1/S2  ABDOMEN: nondistended, soft  EXTREMITIES:  R foot in dressings  PSYCH: alert, answering questions appropriately  NEUROLOGY: non-focal  SKIN: No rashes noted    LABS:              10.2                 x    | x    | x            4.56  >-----------< 230     ------------------------< x                     32.6                 x    | x    | x                                            Ca x     Mg x     Ph x            INR: x    ;    PT: x    ;    PTT: 60.4 SEC<H>                MICROBIOLOGY:    Culture - Blood (collected 24 Apr 2018 07:49)  Source: BLOOD PERIPHERAL  Preliminary Report (25 Apr 2018 07:49):    NO ORGANISMS ISOLATED    NO ORGANISMS ISOLATED AT 24 HOURS    Culture - Blood (collected 23 Apr 2018 18:12)  Source: BLOOD  Preliminary Report (24 Apr 2018 18:12):    NO ORGANISMS ISOLATED    NO ORGANISMS ISOLATED AT 24 HOURS          RADIOLOGY & ADDITIONAL TESTS:    Imaging Personally Reviewed:    < from: MR Foot w/wo IV Cont, Right (04.22.18 @ 22:34) >  IMPRESSION:Osteomyelitis of the distal phalanx of the great toe.   Suspected early osteomyelitis within the distal phalanx of the second toe.    < end of copied text >      [ x ] Consultant(s) Notes Reviewed:     Surgery:     Assessment and Plan:   · Assessment		  78 year old lady with R 1st and 2nd toe gangrene, likely 2/2 PAD, cath lab today:   - Tolerating diet  - Pain control  - RAMIRO/PVR results noted; no vasc intervention indicated  - Cards reccs appreciated; medically management of blockage noted on heart cath  - MRI results noted  - Pods reccs appreciated; management of OM w/ longterm abx  - F/u ID in regards to abx plan  - Cont IV abx  - F/u labs  - Cont hep gtt, pt with hx of A Fib (Takes Eliquis at home)    [ x ] Care Discussed with Consultants/Other Providers: vascular
Patient is a 78y old  Female who presents with a chief complaint of referred by her Vascular Surgeon for gangrene of right toes (19 Apr 2018 19:20)        SUBJECTIVE / OVERNIGHT EVENTS:  no acute events o/n  denies cp/sob  foot pain controlled    MEDICATIONS  (STANDING):  amLODIPine   Tablet 5 milliGRAM(s) Oral daily  aspirin  chewable 81 milliGRAM(s) Oral daily  atorvastatin 40 milliGRAM(s) Oral at bedtime  dextrose 50% Injectable 12.5 Gram(s) IV Push once  dextrose 50% Injectable 25 Gram(s) IV Push once  dextrose 50% Injectable 25 Gram(s) IV Push once  heparin  Infusion. 1200 Unit(s)/Hr (12 mL/Hr) IV Continuous <Continuous>  insulin glargine Injectable (LANTUS) 30 Unit(s) SubCutaneous at bedtime  insulin lispro (HumaLOG) corrective regimen sliding scale   SubCutaneous three times a day before meals  insulin lispro (HumaLOG) corrective regimen sliding scale   SubCutaneous at bedtime  metoprolol tartrate 25 milliGRAM(s) Oral every 12 hours  piperacillin/tazobactam IVPB. 3.375 Gram(s) IV Intermittent every 8 hours  sodium chloride 0.9%. 1000 milliLiter(s) (75 mL/Hr) IV Continuous <Continuous>  vancomycin  IVPB 1250 milliGRAM(s) IV Intermittent every 24 hours    MEDICATIONS  (PRN):  dextrose Gel 1 Dose(s) Oral once PRN Blood Glucose LESS THAN 70 milliGRAM(s)/deciliter  glucagon  Injectable 1 milliGRAM(s) IntraMuscular once PRN Glucose LESS THAN 70 milligrams/deciliter      Vital Signs Last 24 Hrs  T(C): 36.3 (22 Apr 2018 11:14), Max: 36.8 (21 Apr 2018 21:41)  T(F): 97.4 (22 Apr 2018 11:14), Max: 98.3 (21 Apr 2018 21:41)  HR: 75 (22 Apr 2018 11:14) (68 - 83)  BP: 131/75 (22 Apr 2018 11:14) (107/50 - 157/63)  BP(mean): --  RR: 19 (22 Apr 2018 11:14) (18 - 19)  SpO2: 100% (22 Apr 2018 11:14) (100% - 100%)  CAPILLARY BLOOD GLUCOSE      POCT Blood Glucose.: 274 mg/dL (22 Apr 2018 10:59)  POCT Blood Glucose.: 345 mg/dL (21 Apr 2018 21:22)  POCT Blood Glucose.: 286 mg/dL (21 Apr 2018 18:04)  POCT Blood Glucose.: 268 mg/dL (21 Apr 2018 13:03)    I&O's Summary    21 Apr 2018 07:01  -  22 Apr 2018 07:00  --------------------------------------------------------  IN: 1844 mL / OUT: 1550 mL / NET: 294 mL    22 Apr 2018 07:01  -  22 Apr 2018 13:00  --------------------------------------------------------  IN: 411 mL / OUT: 0 mL / NET: 411 mL      GENERAL: NAD, well-groomed, well-developed  EYES: EOMI, PERRLA, conjunctiva and sclera clear  ENMT: Moist mucous membranes  RESPIRATORY: Clear to auscultation bilaterally; No rales, rhonchi, wheezing, or rubs  CARDIOVASCULAR:  No murmurs, rubs, or gallops  GASTROINTESTINAL: Soft, Nontender, Nondistended; Bowel sounds present  EXTREMITIES:  2+ Peripheral Pulses, No clubbing, cyanosis, or edema    LABS:                        10.1   4.61  )-----------( 234      ( 22 Apr 2018 05:16 )             32.1     04-22    134<L>  |  99  |  26<H>  ----------------------------<  253<H>  4.5   |  23  |  1.48<H>    Ca    8.7      22 Apr 2018 05:16  Phos  2.9     04-21  Mg     2.1     04-21      PTT - ( 22 Apr 2018 05:16 )  PTT:59.5 SEC          RADIOLOGY & ADDITIONAL TESTS:    Imaging Personally Reviewed:  Consultant(s) Notes Reviewed:    Care Discussed with Consultants/Other Providers:
Patient is a 78y old  Female who presents with a chief complaint of referred by her Vascular Surgeon for gangrene of right toes (19 Apr 2018 19:20)        SUBJECTIVE / OVERNIGHT EVENTS:  no acute events o/n  pt feels well  denies cp/sob  foot pain is controlled        MEDICATIONS  (STANDING):  amLODIPine   Tablet 5 milliGRAM(s) Oral daily  aspirin  chewable 81 milliGRAM(s) Oral daily  atorvastatin 40 milliGRAM(s) Oral at bedtime  dextrose 5%. 1000 milliLiter(s) (50 mL/Hr) IV Continuous <Continuous>  dextrose 50% Injectable 12.5 Gram(s) IV Push once  dextrose 50% Injectable 25 Gram(s) IV Push once  dextrose 50% Injectable 25 Gram(s) IV Push once  heparin  Infusion. 1200 Unit(s)/Hr (12 mL/Hr) IV Continuous <Continuous>  insulin glargine Injectable (LANTUS) 20 Unit(s) SubCutaneous at bedtime  insulin lispro (HumaLOG) corrective regimen sliding scale   SubCutaneous three times a day before meals  insulin lispro (HumaLOG) corrective regimen sliding scale   SubCutaneous at bedtime  losartan 25 milliGRAM(s) Oral daily  metoprolol tartrate 25 milliGRAM(s) Oral every 12 hours  piperacillin/tazobactam IVPB. 3.375 Gram(s) IV Intermittent every 8 hours  vancomycin  IVPB 1000 milliGRAM(s) IV Intermittent every 24 hours    MEDICATIONS  (PRN):  dextrose Gel 1 Dose(s) Oral once PRN Blood Glucose LESS THAN 70 milliGRAM(s)/deciliter  glucagon  Injectable 1 milliGRAM(s) IntraMuscular once PRN Glucose LESS THAN 70 milligrams/deciliter      Vital Signs Last 24 Hrs  T(C): 36.6 (21 Apr 2018 10:11), Max: 36.6 (20 Apr 2018 17:32)  T(F): 97.9 (21 Apr 2018 10:11), Max: 97.9 (20 Apr 2018 17:32)  HR: 75 (21 Apr 2018 10:11) (62 - 86)  BP: 131/59 (21 Apr 2018 10:11) (109/64 - 137/52)  BP(mean): --  RR: 20 (21 Apr 2018 10:11) (17 - 20)  SpO2: 100% (21 Apr 2018 10:11) (99% - 100%)  CAPILLARY BLOOD GLUCOSE      POCT Blood Glucose.: 268 mg/dL (21 Apr 2018 13:03)  POCT Blood Glucose.: 245 mg/dL (21 Apr 2018 09:16)  POCT Blood Glucose.: 311 mg/dL (20 Apr 2018 21:40)  POCT Blood Glucose.: 241 mg/dL (20 Apr 2018 17:57)    I&O's Summary    20 Apr 2018 07:01  -  21 Apr 2018 07:00  --------------------------------------------------------  IN: 442 mL / OUT: 1100 mL / NET: -658 mL    21 Apr 2018 07:01  -  21 Apr 2018 13:33  --------------------------------------------------------  IN: 0 mL / OUT: 300 mL / NET: -300 mL    GENERAL: NAD, well-groomed, well-developed  EYES: EOMI, PERRLA, conjunctiva and sclera clear  ENMT: Moist mucous membranes  RESPIRATORY: Clear to auscultation bilaterally; No rales, rhonchi, wheezing, or rubs  CARDIOVASCULAR:  No murmurs, rubs, or gallops  GASTROINTESTINAL: Soft, Nontender, Nondistended; Bowel sounds present  EXTREMITIES:  2+ Peripheral Pulses, No clubbing, cyanosis, or edema        LABS:                        10.0   5.01  )-----------( 243      ( 21 Apr 2018 05:30 )             31.7     04-21    134<L>  |  98  |  30<H>  ----------------------------<  234<H>  4.3   |  23  |  1.83<H>    Ca    8.6      21 Apr 2018 05:30  Phos  2.9     04-21  Mg     2.1     04-21    TPro  8.7<H>  /  Alb  4.0  /  TBili  0.7  /  DBili  x   /  AST  44<H>  /  ALT  29  /  AlkPhos  101  04-19    PT/INR - ( 19 Apr 2018 18:42 )   PT: 11.7 SEC;   INR: 1.05          PTT - ( 21 Apr 2018 12:54 )  PTT:65.0 SEC          RADIOLOGY & ADDITIONAL TESTS:    Imaging Personally Reviewed:  Consultant(s) Notes Reviewed:    Care Discussed with Consultants/Other Providers:
Patient is a 78y old  Female who presents with a chief complaint of referred by her Vascular Surgeon for gangrene of right toes (19 Apr 2018 19:20)       INTERVAL HPI/OVERNIGHT EVENTS:  Patient seen and evaluated at bedside.  Pt is resting comfortable in NAD. Denies N/V/F/C.      Allergies    No Known Allergies    Intolerances        Vital Signs Last 24 Hrs  T(C): 36.6 (21 Apr 2018 10:11), Max: 36.6 (20 Apr 2018 17:32)  T(F): 97.9 (21 Apr 2018 10:11), Max: 97.9 (20 Apr 2018 17:32)  HR: 75 (21 Apr 2018 10:11) (62 - 86)  BP: 131/59 (21 Apr 2018 10:11) (109/64 - 137/52)  BP(mean): --  RR: 20 (21 Apr 2018 10:11) (17 - 20)  SpO2: 100% (21 Apr 2018 10:11) (99% - 100%)    LABS:                        10.0   5.01  )-----------( 243      ( 21 Apr 2018 05:30 )             31.7     04-21    134<L>  |  98  |  30<H>  ----------------------------<  234<H>  4.3   |  23  |  1.83<H>    Ca    8.6      21 Apr 2018 05:30  Phos  2.9     04-21  Mg     2.1     04-21    TPro  8.7<H>  /  Alb  4.0  /  TBili  0.7  /  DBili  x   /  AST  44<H>  /  ALT  29  /  AlkPhos  101  04-19    PT/INR - ( 19 Apr 2018 18:42 )   PT: 11.7 SEC;   INR: 1.05          PTT - ( 21 Apr 2018 12:54 )  PTT:65.0 SEC    CAPILLARY BLOOD GLUCOSE      POCT Blood Glucose.: 268 mg/dL (21 Apr 2018 13:03)  POCT Blood Glucose.: 245 mg/dL (21 Apr 2018 09:16)  POCT Blood Glucose.: 311 mg/dL (20 Apr 2018 21:40)  POCT Blood Glucose.: 241 mg/dL (20 Apr 2018 17:57)      Lower Extremity Physical Exam:  Vasular: DP/PT 1/4, B/L, CFT <5 seconds B/L, Temperature gradient warm to warm, B/L.   Neuro: Epicritic sensation diminished to the level of midfoot, B/L.  Musculoskeletal/Ortho: No pain with manipulation of the 1st and 2nd digit. No pain with ROM at the 1st MPJ or 2nd MPJ.     Skin:  Wound #1: RIGHT foot Hallux   Eschar located at the distal end of the digit with associated subungal hemotoma. Eschar is well adhered with no bogginess, fluctuance, erythema, edema, drainage, or obvious necrosis of skin, soft tissue or bone. Distinct discoloration of the foot as compared to contralateral limb. no petechiae noted.     Wound #2: RIGHT foot 2nd digit  Eschar located at the distal end of the digit. Eschar is well adhered with no bogginess, fluctuance, erythema, edema, drainage, or obvious necrosis of skin, soft tissue or bone. Distinct discoloration of the foot as compared to contralateral limb. no petechiae noted.
Patient is a 78y old  Female who presents with a chief complaint of referred by her Vascular Surgeon for gangrene of right toes (19 Apr 2018 19:20)       INTERVAL HPI/OVERNIGHT EVENTS:  Patient seen and evaluated at bedside.  Pt is resting comfortable in NAD. Denies N/V/F/C.      Allergies    No Known Allergies    Intolerances        Vital Signs Last 24 Hrs  T(C): 36.7 (20 Apr 2018 05:23), Max: 36.9 (19 Apr 2018 15:10)  T(F): 98 (20 Apr 2018 05:23), Max: 98.5 (19 Apr 2018 15:10)  HR: 67 (20 Apr 2018 05:23) (67 - 86)  BP: 154/70 (20 Apr 2018 05:23) (133/68 - 154/81)  BP(mean): --  RR: 17 (20 Apr 2018 05:23) (17 - 18)  SpO2: 99% (20 Apr 2018 05:23) (96% - 100%)    LABS:                        11.0   5.43  )-----------( 251      ( 20 Apr 2018 05:30 )             35.5     04-20    132<L>  |  95<L>  |  23  ----------------------------<  271<H>  4.2   |  23  |  1.19    Ca    9.1      20 Apr 2018 05:30  Phos  2.6     04-20  Mg     2.0     04-20    TPro  8.7<H>  /  Alb  4.0  /  TBili  0.7  /  DBili  x   /  AST  44<H>  /  ALT  29  /  AlkPhos  101  04-19    PT/INR - ( 19 Apr 2018 18:42 )   PT: 11.7 SEC;   INR: 1.05          PTT - ( 20 Apr 2018 06:28 )  PTT:38.8 SEC    CAPILLARY BLOOD GLUCOSE      POCT Blood Glucose.: 228 mg/dL (20 Apr 2018 09:03)  POCT Blood Glucose.: 150 mg/dL (19 Apr 2018 21:29)  POCT Blood Glucose.: 210 mg/dL (19 Apr 2018 19:59)      Lower Extremity Physical Exam:  Vasular: DP/PT 1/4, B/L, CFT <5 seconds B/L, Temperature gradient warm to warm, B/L.   Neuro: Epicritic sensation diminished to the level of midfoot, B/L.  Musculoskeletal/Ortho: No pain with manipulation of the 1st and 2nd digit. No pain with ROM at the 1st MPJ or 2nd MPJ.     Skin:  Wound #1: RIGHT foot Hallux   Eschar located at the distal end of the digit with associated subungal hemotoma. Eschar is well adhered with no bogginess, fluctuance, erythema, edema, drainage, or obvious necrosis of skin, soft tissue or bone. Distinct discoloration of the foot as compared to contralateral limb. no petechiae noted.     Wound #2: RIGHT foot 2nd digit  Eschar located at the distal end of the digit. Eschar is well adhered with no bogginess, fluctuance, erythema, edema, drainage, or obvious necrosis of skin, soft tissue or bone. Distinct discoloration of the foot as compared to contralateral limb. no petechiae noted.     RADIOLOGY & ADDITIONAL TESTS:
Patient is a 78y old  Female who presents with a chief complaint of referred by her Vascular Surgeon for gangrene of right toes (19 Apr 2018 19:20)       INTERVAL HPI/OVERNIGHT EVENTS:  Patient seen and evaluated at bedside.  Pt is resting comfortable in NAD. Denies N/V/F/C.     Allergies    No Known Allergies    Intolerances        Vital Signs Last 24 Hrs  T(C): 36.6 (23 Apr 2018 06:20), Max: 36.6 (23 Apr 2018 06:20)  T(F): 97.9 (23 Apr 2018 06:20), Max: 97.9 (23 Apr 2018 06:20)  HR: 73 (23 Apr 2018 06:20) (73 - 78)  BP: 155/62 (23 Apr 2018 06:20) (116/67 - 157/54)  BP(mean): --  RR: 18 (23 Apr 2018 06:20) (17 - 19)  SpO2: 100% (23 Apr 2018 06:20) (100% - 100%)    LABS:                        10.2   4.64  )-----------( 218      ( 23 Apr 2018 06:15 )             33.0     04-23    136  |  103  |  23  ----------------------------<  216<H>  4.5   |  22  |  1.38<H>    Ca    8.8      23 Apr 2018 06:15  Phos  3.4     04-23  Mg     1.8     04-23      PTT - ( 23 Apr 2018 06:15 )  PTT:61.0 SEC    CAPILLARY BLOOD GLUCOSE      POCT Blood Glucose.: 136 mg/dL (22 Apr 2018 22:03)  POCT Blood Glucose.: 109 mg/dL (22 Apr 2018 17:47)  POCT Blood Glucose.: 156 mg/dL (22 Apr 2018 13:08)  POCT Blood Glucose.: 274 mg/dL (22 Apr 2018 10:59)      Lower Extremity Physical Exam:  Vascular: DP/PT 1/4, B/L, CFT <5 seconds B/L, Temperature gradient warm to warm, B/L.   Neuro: Epicritic sensation diminished to the level of midfoot, B/L.  Musculoskeletal/Ortho: No pain with manipulation of the 1st and 2nd digit. No pain with ROM at the 1st MPJ or 2nd MPJ.     Skin:  Wound #1: RIGHT foot Hallux   Eschar located at the distal end of the digit, s/p nail avulsion. Eschar is well adhered with no bogginess, fluctuance, erythema, edema, drainage, or obvious necrosis of skin, soft tissue or bone. Distinct discoloration of the foot as compared to contralateral limb. no petechiae noted.     Wound #2: RIGHT foot 2nd digit  Eschar located at the distal end of the digit. Eschar is well adhered with no bogginess, fluctuance, erythema, edema, drainage, or obvious necrosis of skin, soft tissue or bone. Distinct discoloration of the foot as compared to contralateral limb. no petechiae noted.     RADIOLOGY & ADDITIONAL TESTS:
Patient is a 78y old  Female who presents with a chief complaint of referred by her Vascular Surgeon for gangrene of right toes (19 Apr 2018 19:20)       INTERVAL HPI/OVERNIGHT EVENTS:  Patient seen and evaluated at bedside.  Pt is resting comfortable in NAD. Denies N/V/F/C.    Allergies    No Known Allergies    Intolerances        Vital Signs Last 24 Hrs  T(C): 36.9 (24 Apr 2018 05:49), Max: 37.2 (24 Apr 2018 01:24)  T(F): 98.4 (24 Apr 2018 05:49), Max: 98.9 (24 Apr 2018 01:24)  HR: 67 (24 Apr 2018 05:49) (67 - 76)  BP: 151/71 (24 Apr 2018 05:49) (110/92 - 151/71)  BP(mean): --  RR: 17 (24 Apr 2018 05:49) (16 - 18)  SpO2: 100% (24 Apr 2018 05:49) (96% - 100%)    LABS:                        10.2   4.56  )-----------( 230      ( 24 Apr 2018 05:30 )             32.6     04-23    136  |  103  |  23  ----------------------------<  216<H>  4.5   |  22  |  1.38<H>    Ca    8.8      23 Apr 2018 06:15  Phos  3.4     04-23  Mg     1.8     04-23      PTT - ( 24 Apr 2018 05:30 )  PTT:60.4 SEC    CAPILLARY BLOOD GLUCOSE      POCT Blood Glucose.: 114 mg/dL (24 Apr 2018 05:54)  POCT Blood Glucose.: 122 mg/dL (23 Apr 2018 22:51)  POCT Blood Glucose.: 85 mg/dL (23 Apr 2018 18:12)  POCT Blood Glucose.: 70 mg/dL (23 Apr 2018 13:04)  POCT Blood Glucose.: 177 mg/dL (23 Apr 2018 08:52)      Lower Extremity Physical Exam:  Wound #1: RIGHT foot Hallux   Eschar located at the distal end of the digit, s/p nail avulsion. Eschar is well adhered with no bogginess, fluctuance, erythema, edema, drainage, or obvious necrosis of skin, soft tissue or bone. Distinct discoloration of the foot as compared to contralateral limb. no petechiae noted.     Wound #2: RIGHT foot 2nd digit  Eschar located at the distal end of the digit. Eschar is well adhered with no bogginess, fluctuance, erythema, edema, drainage, or obvious necrosis of skin, soft tissue or bone. Distinct discoloration of the foot as compared to contralateral limb. no petechiae noted.     RADIOLOGY & ADDITIONAL TESTS:  < from: MR Foot w/wo IV Cont, Right (04.22.18 @ 22:34) >  IMPRESSION:Osteomyelitis of the distal phalanx of the great toe.   Suspected early osteomyelitis within the distal phalanx of the second toe.    < end of copied text >
Subjective: Patient seen and examined. No new events except as noted.     SUBJECTIVE/ROS:  No chest pain, dyspnea, palpitation, or dizziness.       MEDICATIONS:  MEDICATIONS  (STANDING):  amLODIPine   Tablet 5 milliGRAM(s) Oral daily  apixaban 5 milliGRAM(s) Oral <User Schedule>  aspirin  chewable 81 milliGRAM(s) Oral daily  atorvastatin 40 milliGRAM(s) Oral at bedtime  cilostazol 50 milliGRAM(s) Oral two times a day  ciprofloxacin     Tablet 500 milliGRAM(s) Oral every 12 hours  dextrose 50% Injectable 12.5 Gram(s) IV Push once  dextrose 50% Injectable 25 Gram(s) IV Push once  dextrose 50% Injectable 25 Gram(s) IV Push once  doxycycline hyclate Capsule 100 milliGRAM(s) Oral every 12 hours  insulin glargine Injectable (LANTUS) 35 Unit(s) SubCutaneous at bedtime  insulin lispro (HumaLOG) corrective regimen sliding scale   SubCutaneous three times a day before meals  insulin lispro (HumaLOG) corrective regimen sliding scale   SubCutaneous at bedtime  insulin lispro Injectable (HumaLOG) 7 Unit(s) SubCutaneous three times a day before meals  metoprolol tartrate 25 milliGRAM(s) Oral every 12 hours      PHYSICAL EXAM:  T(C): 36.7 (04-25-18 @ 05:49), Max: 36.8 (04-24-18 @ 11:36)  HR: 81 (04-25-18 @ 05:49) (57 - 99)  BP: 126/57 (04-25-18 @ 05:49) (110/59 - 152/80)  RR: 18 (04-25-18 @ 05:49) (17 - 20)  SpO2: 100% (04-25-18 @ 05:49) (100% - 100%)  Wt(kg): --  I&O's Summary    24 Apr 2018 07:01  -  25 Apr 2018 07:00  --------------------------------------------------------  IN: 30 mL / OUT: 1625 mL / NET: -1595 mL          JVP: Normal  Neck: supple  Lung: clear   CV: S1 S2 , Murmur:  Abd: soft  Ext: No edema  neuro: Awake / alert  Psych: flat affect  Skin: normal       LABS/DATA:    CARDIAC MARKERS:      < from: Transthoracic Echocardiogram (04.22.18 @ 08:27) >  ------------------------------------------------------------------------  CONCLUSIONS:  1. Moderate segmental left ventricular systolic  dysfunction. The inferior and inferolateral walls are  severely hypokinetic.  2. Normal right ventricular size and function.    < end of copied text >    < from: Cardiac Cath Lab - Adult (04.24.18 @ 12:52) >  CORONARY VESSELS: The coronary circulation is right dominant.  LM:   --  LM: Normal.  LAD:   --  Proximal LAD: There was a tubular 20 % stenosis at the site of a  prior stent.  --  D2: There was a discrete 60 % stenosis at the ostium of the vessel  segment.  CX:   --  OM3: The vessel was small sized. The distal vessel was supplied  by collaterals from the second obtuse marginal. There was a diffuse 99 %  stenosis at the site of a prior stent. There was LISSETT grade 2 flow through  the vessel (partial perfusion). This lesion is a likely culprit for the  patient's abnormal stress test.  RCA:   --  RCA: Angiography showed minor luminal irregularities with no  flow limiting lesions.  --  Mid RCA: There was a diffuse 30 % stenosis.  COMPLICATIONS: There were no complications.  DIAGNOSTIC RECOMMENDATIONS: Patient management should include aggressive  medical therapy and close monitoring of BUN and creatinine.    < end of copied text >                          10.2   4.56  )-----------( 230      ( 24 Apr 2018 05:30 )             32.6           proBNP:   Lipid Profile:   HgA1c:   TSH:     TELE:  EKG:
Subjective: Patient seen and examined. No new events except as noted.     SUBJECTIVE/ROS:  No chest pain, dyspnea, palpitation, or dizziness.       MEDICATIONS:  MEDICATIONS  (STANDING):  amLODIPine   Tablet 5 milliGRAM(s) Oral daily  aspirin  chewable 81 milliGRAM(s) Oral daily  atorvastatin 40 milliGRAM(s) Oral at bedtime  cilostazol 50 milliGRAM(s) Oral two times a day  dextrose 5%. 1000 milliLiter(s) (50 mL/Hr) IV Continuous <Continuous>  dextrose 50% Injectable 12.5 Gram(s) IV Push once  dextrose 50% Injectable 25 Gram(s) IV Push once  dextrose 50% Injectable 25 Gram(s) IV Push once  heparin  Infusion. 1200 Unit(s)/Hr (12 mL/Hr) IV Continuous <Continuous>  insulin glargine Injectable (LANTUS) 20 Unit(s) SubCutaneous at bedtime  insulin lispro (HumaLOG) corrective regimen sliding scale   SubCutaneous three times a day before meals  insulin lispro (HumaLOG) corrective regimen sliding scale   SubCutaneous at bedtime  losartan 25 milliGRAM(s) Oral daily  metoprolol tartrate 25 milliGRAM(s) Oral every 12 hours  piperacillin/tazobactam IVPB. 3.375 Gram(s) IV Intermittent every 8 hours  vancomycin  IVPB 1000 milliGRAM(s) IV Intermittent every 24 hours      PHYSICAL EXAM:  T(C): 36.2 (04-21-18 @ 05:16), Max: 36.6 (04-20-18 @ 17:32)  HR: 62 (04-21-18 @ 05:16) (62 - 88)  BP: 137/52 (04-21-18 @ 05:16) (109/64 - 137/52)  RR: 17 (04-21-18 @ 05:16) (17 - 19)  SpO2: 100% (04-21-18 @ 05:16) (99% - 100%)  Wt(kg): --  I&O's Summary    20 Apr 2018 07:01  -  21 Apr 2018 07:00  --------------------------------------------------------  IN: 442 mL / OUT: 1100 mL / NET: -658 mL        JVP: Normal  Neck: supple  Lung: clear   CV: S1 S2 , Murmur:  Abd: soft  Ext: No edema  neuro: Awake / alert  Psych: flat affect  Skin: normal       LABS/DATA:    CARDIAC MARKERS:                                10.0   5.01  )-----------( 243      ( 21 Apr 2018 05:30 )             31.7     04-21    134<L>  |  98  |  30<H>  ----------------------------<  234<H>  4.3   |  23  |  1.83<H>    Ca    8.6      21 Apr 2018 05:30  Phos  2.9     04-21  Mg     2.1     04-21    TPro  8.7<H>  /  Alb  4.0  /  TBili  0.7  /  DBili  x   /  AST  44<H>  /  ALT  29  /  AlkPhos  101  04-19    proBNP:   Lipid Profile:   HgA1c:   TSH:     TELE:  EKG:
Subjective: Patient seen and examined. No new events except as noted.     SUBJECTIVE/ROS:  No chest pain, dyspnea, palpitation, or dizziness.       MEDICATIONS:  MEDICATIONS  (STANDING):  amLODIPine   Tablet 5 milliGRAM(s) Oral daily  aspirin  chewable 81 milliGRAM(s) Oral daily  atorvastatin 40 milliGRAM(s) Oral at bedtime  dextrose 5%. 1000 milliLiter(s) (50 mL/Hr) IV Continuous <Continuous>  dextrose 50% Injectable 12.5 Gram(s) IV Push once  dextrose 50% Injectable 25 Gram(s) IV Push once  dextrose 50% Injectable 25 Gram(s) IV Push once  heparin  Infusion. 1200 Unit(s)/Hr (12 mL/Hr) IV Continuous <Continuous>  insulin glargine Injectable (LANTUS) 30 Unit(s) SubCutaneous at bedtime  insulin lispro (HumaLOG) corrective regimen sliding scale   SubCutaneous three times a day before meals  insulin lispro (HumaLOG) corrective regimen sliding scale   SubCutaneous at bedtime  metoprolol tartrate 25 milliGRAM(s) Oral every 12 hours  piperacillin/tazobactam IVPB. 3.375 Gram(s) IV Intermittent every 8 hours  sodium chloride 1 Gram(s) Oral once  sodium chloride 0.9%. 1000 milliLiter(s) (75 mL/Hr) IV Continuous <Continuous>  vancomycin  IVPB 1250 milliGRAM(s) IV Intermittent every 24 hours      PHYSICAL EXAM:  T(C): 36.7 (04-22-18 @ 05:25), Max: 36.8 (04-21-18 @ 21:41)  HR: 68 (04-22-18 @ 05:25) (68 - 83)  BP: 128/68 (04-22-18 @ 05:25) (107/50 - 157/63)  RR: 18 (04-22-18 @ 05:25) (18 - 18)  SpO2: 100% (04-22-18 @ 05:25) (100% - 100%)  Wt(kg): --  I&O's Summary    21 Apr 2018 07:01  -  22 Apr 2018 07:00  --------------------------------------------------------  IN: 1844 mL / OUT: 1550 mL / NET: 294 mL    22 Apr 2018 07:01  -  22 Apr 2018 10:38  --------------------------------------------------------  IN: 87 mL / OUT: 0 mL / NET: 87 mL        JVP: Normal  Neck: supple  Lung: clear   CV: S1 S2 , Murmur:  Abd: soft  Ext: No edema  neuro: Awake / alert  Psych: flat affect  Skin: normal       LABS/DATA:    CARDIAC MARKERS:                                10.1   4.61  )-----------( 234      ( 22 Apr 2018 05:16 )             32.1     04-22    134<L>  |  99  |  26<H>  ----------------------------<  253<H>  4.5   |  23  |  1.48<H>    Ca    8.7      22 Apr 2018 05:16  Phos  2.9     04-21  Mg     2.1     04-21      proBNP:   Lipid Profile:   HgA1c:   TSH:     TELE:  EKG:
Subjective: Patient seen and examined. No new events except as noted.     SUBJECTIVE/ROS:  No chest pain, dyspnea, palpitation, or dizziness.       MEDICATIONS:  MEDICATIONS  (STANDING):  amLODIPine   Tablet 5 milliGRAM(s) Oral daily  aspirin  chewable 81 milliGRAM(s) Oral daily  atorvastatin 40 milliGRAM(s) Oral at bedtime  dextrose 50% Injectable 12.5 Gram(s) IV Push once  dextrose 50% Injectable 25 Gram(s) IV Push once  dextrose 50% Injectable 25 Gram(s) IV Push once  heparin  Infusion. 1200 Unit(s)/Hr (12 mL/Hr) IV Continuous <Continuous>  insulin glargine Injectable (LANTUS) 35 Unit(s) SubCutaneous at bedtime  insulin lispro (HumaLOG) corrective regimen sliding scale   SubCutaneous three times a day before meals  insulin lispro (HumaLOG) corrective regimen sliding scale   SubCutaneous at bedtime  insulin lispro Injectable (HumaLOG) 7 Unit(s) SubCutaneous three times a day before meals  metoprolol tartrate 25 milliGRAM(s) Oral every 12 hours  piperacillin/tazobactam IVPB. 3.375 Gram(s) IV Intermittent every 8 hours  sodium chloride 0.9%. 1000 milliLiter(s) (75 mL/Hr) IV Continuous <Continuous>  vancomycin  IVPB 1250 milliGRAM(s) IV Intermittent every 24 hours      PHYSICAL EXAM:  T(C): 36.6 (04-23-18 @ 06:20), Max: 36.6 (04-23-18 @ 06:20)  HR: 73 (04-23-18 @ 06:20) (73 - 78)  BP: 155/62 (04-23-18 @ 06:20) (116/67 - 157/54)  RR: 18 (04-23-18 @ 06:20) (17 - 19)  SpO2: 100% (04-23-18 @ 06:20) (100% - 100%)  Wt(kg): --  I&O's Summary    22 Apr 2018 07:01  -  23 Apr 2018 07:00  --------------------------------------------------------  IN: 1895 mL / OUT: 400 mL / NET: 1495 mL    23 Apr 2018 07:01  -  23 Apr 2018 08:14  --------------------------------------------------------  IN: 0 mL / OUT: 300 mL / NET: -300 mL          JVP: Normal  Neck: supple  Lung: clear   CV: S1 S2 , Murmur:  Abd: soft  Ext: No edema  neuro: Awake / alert  Psych: flat affect  Skin: normal       LABS/DATA:    CARDIAC MARKERS:                                10.2   4.64  )-----------( 218      ( 23 Apr 2018 06:15 )             33.0     04-23    136  |  103  |  23  ----------------------------<  216<H>  4.5   |  22  |  1.38<H>    Ca    8.8      23 Apr 2018 06:15  Phos  3.4     04-23  Mg     1.8     04-23      proBNP:   Lipid Profile:   HgA1c:   TSH:     TELE:  EKG:  < from: Transthoracic Echocardiogram (04.22.18 @ 08:27) >  CONCLUSIONS:  1. Moderate segmental left ventricular systolic  dysfunction. The inferior and inferolateral walls are  severely hypokinetic.  2. Normal right ventricular size and function.  ------------------------------------------------------------------------    < end of copied text >
Subjective: Patient seen and examined. No new events except as noted.     SUBJECTIVE/ROS:  feels ok       MEDICATIONS:  MEDICATIONS  (STANDING):  amLODIPine   Tablet 5 milliGRAM(s) Oral daily  aspirin  chewable 81 milliGRAM(s) Oral daily  atorvastatin 40 milliGRAM(s) Oral at bedtime  ciprofloxacin     Tablet 500 milliGRAM(s) Oral every 12 hours  dextrose 50% Injectable 12.5 Gram(s) IV Push once  dextrose 50% Injectable 25 Gram(s) IV Push once  dextrose 50% Injectable 25 Gram(s) IV Push once  doxycycline hyclate Capsule 100 milliGRAM(s) Oral every 12 hours  heparin  Infusion. 1200 Unit(s)/Hr (12 mL/Hr) IV Continuous <Continuous>  insulin glargine Injectable (LANTUS) 35 Unit(s) SubCutaneous at bedtime  insulin lispro (HumaLOG) corrective regimen sliding scale   SubCutaneous every 6 hours  insulin lispro Injectable (HumaLOG) 7 Unit(s) SubCutaneous three times a day before meals  metoprolol tartrate 25 milliGRAM(s) Oral every 12 hours  sodium chloride 0.9%. 1000 milliLiter(s) (75 mL/Hr) IV Continuous <Continuous>      PHYSICAL EXAM:  T(C): 36.9 (04-24-18 @ 05:49), Max: 37.2 (04-24-18 @ 01:24)  HR: 67 (04-24-18 @ 05:49) (67 - 76)  BP: 151/71 (04-24-18 @ 05:49) (110/92 - 151/71)  RR: 17 (04-24-18 @ 05:49) (16 - 18)  SpO2: 100% (04-24-18 @ 05:49) (96% - 100%)  Wt(kg): --  I&O's Summary    23 Apr 2018 07:01  -  24 Apr 2018 07:00  --------------------------------------------------------  IN: 1714 mL / OUT: 2550 mL / NET: -836 mL    24 Apr 2018 07:01  -  24 Apr 2018 10:54  --------------------------------------------------------  IN: 0 mL / OUT: 400 mL / NET: -400 mL      Height (cm): 165.1 (04-24 @ 03:52)  Weight (kg): 83.4 (04-24 @ 03:52)  BMI (kg/m2): 30.6 (04-24 @ 03:52)  BSA (m2): 1.91 (04-24 @ 03:52)    JVP: Normal  Neck: supple  Lung: clear   CV: S1 S2 , Murmur:  Abd: soft  Ext: No edema  neuro: Awake / alert  Psych: flat affect  Skin: normal       LABS/DATA:    CARDIAC MARKERS:                                10.2   4.56  )-----------( 230      ( 24 Apr 2018 05:30 )             32.6     04-23    136  |  103  |  23  ----------------------------<  216<H>  4.5   |  22  |  1.38<H>    Ca    8.8      23 Apr 2018 06:15  Phos  3.4     04-23  Mg     1.8     04-23      proBNP:   Lipid Profile:   HgA1c:   TSH:     TELE:  EKG:
VASCULAR DAILY PROGRESS NOTE:     Subjective: Pt seen this AM. NAEO. Pain controlled. No N/V. Tolerating PO intake. Remains afebrile.         Objective:    PE:  Gen: NAD  Resp: respirations unlabored  CVS: regular rate and rhythm  Abdomen: soft, nontender, nondistended  Extremities: no edema  Skin: warm, dry, appropriate color, dry gangrene with betadine over right 1st and 2nd toes, AT and popliteal signals bilaterally, palpable femoral pulses bilaterally    Vital Signs Last 24 Hrs  T(C): 36.6 (2018 06:20), Max: 36.6 (2018 06:20)  T(F): 97.9 (2018 06:20), Max: 97.9 (2018 06:20)  HR: 73 (2018 06:20) (73 - 78)  BP: 155/62 (2018 06:20) (116/67 - 157/54)  BP(mean): --  RR: 18 (2018 06:20) (17 - 19)  SpO2: 100% (2018 06:20) (100% - 100%)    I&O's Detail    2018 07:01  -  2018 07:00  --------------------------------------------------------  IN:    heparin  Infusion.: 120 mL    IV PiggyBack: 350 mL    sodium chloride 0.9%.: 1425 mL  Total IN: 1895 mL    OUT:    Voided: 400 mL  Total OUT: 400 mL    Total NET: 1495 mL      2018 07:01  -  2018 07:26  --------------------------------------------------------  IN:  Total IN: 0 mL    OUT:    Voided: 100 mL  Total OUT: 100 mL    Total NET: -100 mL          Daily     Daily Weight in k.7 (2018 01:38)    MEDICATIONS  (STANDING):  amLODIPine   Tablet 5 milliGRAM(s) Oral daily  aspirin  chewable 81 milliGRAM(s) Oral daily  atorvastatin 40 milliGRAM(s) Oral at bedtime  dextrose 50% Injectable 12.5 Gram(s) IV Push once  dextrose 50% Injectable 25 Gram(s) IV Push once  dextrose 50% Injectable 25 Gram(s) IV Push once  heparin  Infusion. 1200 Unit(s)/Hr (12 mL/Hr) IV Continuous <Continuous>  insulin glargine Injectable (LANTUS) 35 Unit(s) SubCutaneous at bedtime  insulin lispro (HumaLOG) corrective regimen sliding scale   SubCutaneous three times a day before meals  insulin lispro (HumaLOG) corrective regimen sliding scale   SubCutaneous at bedtime  insulin lispro Injectable (HumaLOG) 7 Unit(s) SubCutaneous three times a day before meals  metoprolol tartrate 25 milliGRAM(s) Oral every 12 hours  piperacillin/tazobactam IVPB. 3.375 Gram(s) IV Intermittent every 8 hours  sodium chloride 0.9%. 1000 milliLiter(s) (75 mL/Hr) IV Continuous <Continuous>  vancomycin  IVPB 1250 milliGRAM(s) IV Intermittent every 24 hours    MEDICATIONS  (PRN):  dextrose Gel 1 Dose(s) Oral once PRN Blood Glucose LESS THAN 70 milliGRAM(s)/deciliter  glucagon  Injectable 1 milliGRAM(s) IntraMuscular once PRN Glucose LESS THAN 70 milligrams/deciliter  oxyCODONE    IR 5 milliGRAM(s) Oral every 4 hours PRN Moderate Pain (4 - 6)  oxyCODONE    IR 10 milliGRAM(s) Oral every 4 hours PRN Severe Pain (7 - 10)      LABS:                        10.2   4.64  )-----------( 218      ( 2018 06:15 )             33.0     -23    136  |  103  |  23  ----------------------------<  216<H>  4.5   |  22  |  1.38<H>    Ca    8.8      2018 06:15  Phos  3.4     04-23  Mg     1.8     -      PTT - ( 2018 06:15 )  PTT:61.0 SEC      RADIOLOGY & ADDITIONAL STUDIES:
VASCULAR DAILY PROGRESS NOTE:     Subjective: Pt seen this AM. NAEO. Pain controlled. No N/V. Tolerating PO intake. Remains afebrile.     Objective:  Vital Signs Last 24 Hrs  T(C): 36.9, Max: 37.2 (24 Apr 2018 01:24)  HR: 67 (67 - 76)  BP: 151/71 (110/92 - 151/71)  RR: 17 (16 - 18)  SpO2: 100% (96% - 100%)      PE:  Gen: NAD  Resp: respirations unlabored  CVS: regular rate and rhythm  Abdomen: soft, nontender, nondistended  Extremities: no edema  Skin: warm, dry, appropriate color, dry gangrene with betadine over right 1st and 2nd toes, AT and popliteal signals bilaterally, palpable femoral pulses bilaterally      LABS:                         10.2   4.56  )-----------( 230      ( 24 Apr 2018 05:30 )             32.6     04-23    136  |  103  |  23  ----------------------------<  216<H>  4.5   |  22  |  1.38<H>    Ca    8.8      23 Apr 2018 06:15  Phos  3.4     04-23  Mg     1.8     04-23      PTT - ( 24 Apr 2018 05:30 )  PTT:60.4 SEC          RADIOLOGY, EKG & ADDITIONAL TESTS: Reviewed.  Stress Test:   IMPRESSIONS:Abnormal Study  * Myocardial Perfusion SPECT results are abnormal.  * There are large, moderate to severe defects in apical,  inferior and inferolateral walls that are partially  reversible, suggestive of infarction with moderate  jorge luis-infarct ischemia.  * Post-stress gated wall motion analysis was performed  (LVEF = 46 %;LVEDV = 98 ml.), revealing mild overall  hypokinesis due to segmental wall motion abnormality.  There was severe inferior / inferolateral hypokinesis and  diminished systolic thickening. The remaining segments and  RV contracted well.
VASCULAR DAILY PROGRESS NOTE:     Subjective: Pt seen this AM. NAEO. Pain controlled. Tolerating PO intake. Remains afebrile.       Objective:    PE:  Gen: NAD  Resp: respirations unlabored  CVS: regular rate and rhythm  Abdomen: soft, nontender, nondistended  Extremities: no edema  Skin: warm, dry, appropriate color, dry gangrene with betadine over right 1st and 2nd toes, AT and popliteal signals bilaterally, palpable femoral pulses bilaterally      Vital Signs Last 24 Hrs  T(C): 36.2 (:29), Max: 36.7 (2018 05:23)  T(F): 97.2 (:), Max: 98 (2018 05:23)  HR: 81 (:) (67 - 88)  BP: 109/64 (:) (109/64 - 154/70)  BP(mean): --  RR: 18 (:) (17 - 19)  SpO2: 99% (:) (99% - 100%)    I&O's Detail    2018 07:01  -  2018 07:00  --------------------------------------------------------  IN:    heparin Infusion: 56 mL    IV PiggyBack: 350 mL  Total IN: 406 mL    OUT:    Voided: 1350 mL  Total OUT: 1350 mL    Total NET: -944 mL      2018 07:01  -  2018 04:51  --------------------------------------------------------  IN:    heparin  Infusion.: 12 mL    IV PiggyBack: 400 mL  Total IN: 412 mL    OUT:    Voided: 800 mL  Total OUT: 800 mL    Total NET: -388 mL          Daily     Daily Weight in k.4 (2018 01:29)    MEDICATIONS  (STANDING):  amLODIPine   Tablet 5 milliGRAM(s) Oral daily  aspirin  chewable 81 milliGRAM(s) Oral daily  atorvastatin 40 milliGRAM(s) Oral at bedtime  cilostazol 50 milliGRAM(s) Oral two times a day  dextrose 5%. 1000 milliLiter(s) (50 mL/Hr) IV Continuous <Continuous>  dextrose 50% Injectable 12.5 Gram(s) IV Push once  dextrose 50% Injectable 25 Gram(s) IV Push once  dextrose 50% Injectable 25 Gram(s) IV Push once  heparin  Infusion. 1200 Unit(s)/Hr (12 mL/Hr) IV Continuous <Continuous>  insulin glargine Injectable (LANTUS) 20 Unit(s) SubCutaneous at bedtime  insulin lispro (HumaLOG) corrective regimen sliding scale   SubCutaneous three times a day before meals  insulin lispro (HumaLOG) corrective regimen sliding scale   SubCutaneous at bedtime  losartan 25 milliGRAM(s) Oral daily  metoprolol tartrate 25 milliGRAM(s) Oral every 12 hours  piperacillin/tazobactam IVPB. 3.375 Gram(s) IV Intermittent every 8 hours  vancomycin  IVPB 1000 milliGRAM(s) IV Intermittent every 24 hours    MEDICATIONS  (PRN):  dextrose Gel 1 Dose(s) Oral once PRN Blood Glucose LESS THAN 70 milliGRAM(s)/deciliter  glucagon  Injectable 1 milliGRAM(s) IntraMuscular once PRN Glucose LESS THAN 70 milligrams/deciliter      LABS:                        10.3   6.12  )-----------( 250      ( 2018 13:57 )             32.9     04-20    132<L>  |  95<L>  |  23  ----------------------------<  271<H>  4.2   |  23  |  1.19    Ca    9.1      2018 05:30  Phos  2.6     04-20  Mg     2.0     04-20    TPro  8.7<H>  /  Alb  4.0  /  TBili  0.7  /  DBili  x   /  AST  44<H>  /  ALT  29  /  AlkPhos  101  04-19    PT/INR - ( 2018 18:42 )   PT: 11.7 SEC;   INR: 1.05          PTT - ( 2018 22:00 )  PTT:143.0 SEC      RADIOLOGY & ADDITIONAL STUDIES:
VASCULAR DAILY PROGRESS NOTE:     Subjective: Pt seen this AM. NAEO. Pain controlled. Tolerating PO intake. Remains afebrile.     Objective:  Vital Signs Last 24 Hrs  T(C): 36.4 (22 Apr 2018 01:46), Max: 36.8 (21 Apr 2018 21:41)  T(F): 97.6 (22 Apr 2018 01:46), Max: 98.3 (21 Apr 2018 21:41)  HR: 83 (22 Apr 2018 01:46) (62 - 83)  BP: 128/61 (22 Apr 2018 01:46) (107/50 - 157/63)  BP(mean): --  RR: 18 (22 Apr 2018 01:46) (17 - 20)  SpO2: 100% (22 Apr 2018 01:46) (100% - 100%)    PE:  Gen: NAD  Resp: respirations unlabored  CVS: regular rate and rhythm  Abdomen: soft, nontender, nondistended  Extremities: no edema  Skin: warm, dry, appropriate color, dry gangrene with betadine over right 1st and 2nd toes, AT and popliteal signals bilaterally, palpable femoral pulses bilaterally    MEDICATIONS  (STANDING):  amLODIPine   Tablet 5 milliGRAM(s) Oral daily  aspirin  chewable 81 milliGRAM(s) Oral daily  atorvastatin 40 milliGRAM(s) Oral at bedtime  dextrose 5%. 1000 milliLiter(s) (50 mL/Hr) IV Continuous <Continuous>  dextrose 50% Injectable 12.5 Gram(s) IV Push once  dextrose 50% Injectable 25 Gram(s) IV Push once  dextrose 50% Injectable 25 Gram(s) IV Push once  heparin  Infusion. 1200 Unit(s)/Hr (12 mL/Hr) IV Continuous <Continuous>  insulin glargine Injectable (LANTUS) 30 Unit(s) SubCutaneous at bedtime  insulin lispro (HumaLOG) corrective regimen sliding scale   SubCutaneous three times a day before meals  insulin lispro (HumaLOG) corrective regimen sliding scale   SubCutaneous at bedtime  metoprolol tartrate 25 milliGRAM(s) Oral every 12 hours  piperacillin/tazobactam IVPB. 3.375 Gram(s) IV Intermittent every 8 hours  sodium chloride 0.9%. 1000 milliLiter(s) (75 mL/Hr) IV Continuous <Continuous>  vancomycin  IVPB 1250 milliGRAM(s) IV Intermittent every 24 hours    MEDICATIONS  (PRN):  dextrose Gel 1 Dose(s) Oral once PRN Blood Glucose LESS THAN 70 milliGRAM(s)/deciliter  glucagon  Injectable 1 milliGRAM(s) IntraMuscular once PRN Glucose LESS THAN 70 milligrams/deciliter      LABS:        RADIOLOGY & ADDITIONAL STUDIES:
VASCULAR DAILY PROGRESS NOTE:     Subjective: Pt seen this AM. NAEO. Tolerating PO intake. No N/V.  Remains afebrile.          Objective:    PE:  Gen: NAD  Resp: respirations unlabored  CVS: regular rate and rhythm  Abdomen: soft, nontender, nondistended  Extremities: no edema  Skin: warm, dry, appropriate color, dry gangrene with betadine over right 1st and 2nd toes, AT and popliteal signals bilaterally, palpable femoral pulses bilaterally    Vital Signs Last 24 Hrs  T(C): 36.4 (2018 10:12), Max: 36.9 (2018 15:10)  T(F): 97.5 (2018 10:12), Max: 98.5 (2018 15:10)  HR: 88 (2018 10:12) (67 - 88)  BP: 121/62 (2018 10:12) (121/62 - 154/81)  BP(mean): --  RR: 18 (2018 10:12) (17 - 18)  SpO2: 99% (2018 10:12) (96% - 100%)    I&O's Detail    2018 07:01  -  2018 07:00  --------------------------------------------------------  IN:    heparin Infusion: 56 mL    IV PiggyBack: 350 mL  Total IN: 406 mL    OUT:    Voided: 1350 mL  Total OUT: 1350 mL    Total NET: -944 mL          Daily     Daily Weight in k.4 (2018 01:35)    MEDICATIONS  (STANDING):  amLODIPine   Tablet 5 milliGRAM(s) Oral daily  aspirin  chewable 81 milliGRAM(s) Oral daily  atorvastatin 40 milliGRAM(s) Oral at bedtime  cilostazol 50 milliGRAM(s) Oral two times a day  dextrose 5%. 1000 milliLiter(s) (50 mL/Hr) IV Continuous <Continuous>  dextrose 50% Injectable 12.5 Gram(s) IV Push once  dextrose 50% Injectable 25 Gram(s) IV Push once  dextrose 50% Injectable 25 Gram(s) IV Push once  heparin  Infusion. 1200 Unit(s)/Hr (12 mL/Hr) IV Continuous <Continuous>  insulin lispro (HumaLOG) corrective regimen sliding scale   SubCutaneous three times a day before meals  insulin lispro (HumaLOG) corrective regimen sliding scale   SubCutaneous at bedtime  losartan 25 milliGRAM(s) Oral daily  metoprolol tartrate 25 milliGRAM(s) Oral every 12 hours  piperacillin/tazobactam IVPB. 3.375 Gram(s) IV Intermittent every 8 hours  vancomycin  IVPB 1000 milliGRAM(s) IV Intermittent every 24 hours    MEDICATIONS  (PRN):  dextrose Gel 1 Dose(s) Oral once PRN Blood Glucose LESS THAN 70 milliGRAM(s)/deciliter  glucagon  Injectable 1 milliGRAM(s) IntraMuscular once PRN Glucose LESS THAN 70 milligrams/deciliter      LABS:                        11.0   5.43  )-----------( 251      ( 2018 05:30 )             35.5     04-20    132<L>  |  95<L>  |  23  ----------------------------<  271<H>  4.2   |  23  |  1.19    Ca    9.1      2018 05:30  Phos  2.6     04-20  Mg     2.0     04-20    TPro  8.7<H>  /  Alb  4.0  /  TBili  0.7  /  DBili  x   /  AST  44<H>  /  ALT  29  /  AlkPhos  101  04-19    PT/INR - ( 2018 18:42 )   PT: 11.7 SEC;   INR: 1.05          PTT - ( 2018 06:28 )  PTT:38.8 SEC      RADIOLOGY & ADDITIONAL STUDIES:

## 2018-04-28 LAB — BACTERIA BLD CULT: SIGNIFICANT CHANGE UP

## 2018-04-29 LAB — BACTERIA BLD CULT: SIGNIFICANT CHANGE UP

## 2018-06-08 ENCOUNTER — EMERGENCY (EMERGENCY)
Facility: HOSPITAL | Age: 78
LOS: 1 days | Discharge: HOME | End: 2018-06-08
Attending: EMERGENCY MEDICINE | Admitting: EMERGENCY MEDICINE
Payer: MEDICARE

## 2018-06-08 VITALS
SYSTOLIC BLOOD PRESSURE: 154 MMHG | DIASTOLIC BLOOD PRESSURE: 131 MMHG | RESPIRATION RATE: 16 BRPM | HEART RATE: 69 BPM | OXYGEN SATURATION: 100 %

## 2018-06-08 VITALS
TEMPERATURE: 97 F | OXYGEN SATURATION: 100 % | SYSTOLIC BLOOD PRESSURE: 156 MMHG | RESPIRATION RATE: 16 BRPM | HEART RATE: 71 BPM | DIASTOLIC BLOOD PRESSURE: 78 MMHG

## 2018-06-08 DIAGNOSIS — Z95.5 PRESENCE OF CORONARY ANGIOPLASTY IMPLANT AND GRAFT: Chronic | ICD-10-CM

## 2018-06-08 DIAGNOSIS — Z98.890 OTHER SPECIFIED POSTPROCEDURAL STATES: Chronic | ICD-10-CM

## 2018-06-08 DIAGNOSIS — Z95.1 PRESENCE OF AORTOCORONARY BYPASS GRAFT: Chronic | ICD-10-CM

## 2018-06-08 LAB
ALBUMIN SERPL ELPH-MCNC: 3.3 G/DL — SIGNIFICANT CHANGE UP (ref 3.3–5)
ALP SERPL-CCNC: 83 U/L — SIGNIFICANT CHANGE UP (ref 40–120)
ALT FLD-CCNC: SIGNIFICANT CHANGE UP U/L (ref 4–33)
APPEARANCE UR: CLEAR — SIGNIFICANT CHANGE UP
APTT BLD: 37.6 SEC — HIGH (ref 27.5–37.4)
AST SERPL-CCNC: SIGNIFICANT CHANGE UP U/L (ref 4–32)
BASOPHILS # BLD AUTO: 0.03 K/UL — SIGNIFICANT CHANGE UP (ref 0–0.2)
BASOPHILS NFR BLD AUTO: 0.5 % — SIGNIFICANT CHANGE UP (ref 0–2)
BILIRUB SERPL-MCNC: 0.6 MG/DL — SIGNIFICANT CHANGE UP (ref 0.2–1.2)
BILIRUB UR-MCNC: NEGATIVE — SIGNIFICANT CHANGE UP
BLOOD UR QL VISUAL: HIGH
BUN SERPL-MCNC: 23 MG/DL — SIGNIFICANT CHANGE UP (ref 7–23)
BUN SERPL-MCNC: 23 MG/DL — SIGNIFICANT CHANGE UP (ref 7–23)
CALCIUM SERPL-MCNC: 8.6 MG/DL — SIGNIFICANT CHANGE UP (ref 8.4–10.5)
CALCIUM SERPL-MCNC: 9.3 MG/DL — SIGNIFICANT CHANGE UP (ref 8.4–10.5)
CHLORIDE SERPL-SCNC: 103 MMOL/L — SIGNIFICANT CHANGE UP (ref 98–107)
CHLORIDE SERPL-SCNC: 104 MMOL/L — SIGNIFICANT CHANGE UP (ref 98–107)
CO2 SERPL-SCNC: 22 MMOL/L — SIGNIFICANT CHANGE UP (ref 22–31)
CO2 SERPL-SCNC: 26 MMOL/L — SIGNIFICANT CHANGE UP (ref 22–31)
COLOR SPEC: YELLOW — SIGNIFICANT CHANGE UP
CREAT SERPL-MCNC: 0.91 MG/DL — SIGNIFICANT CHANGE UP (ref 0.5–1.3)
CREAT SERPL-MCNC: 1.03 MG/DL — SIGNIFICANT CHANGE UP (ref 0.5–1.3)
EOSINOPHIL # BLD AUTO: 0.08 K/UL — SIGNIFICANT CHANGE UP (ref 0–0.5)
EOSINOPHIL NFR BLD AUTO: 1.3 % — SIGNIFICANT CHANGE UP (ref 0–6)
GLUCOSE SERPL-MCNC: 87 MG/DL — SIGNIFICANT CHANGE UP (ref 70–99)
GLUCOSE SERPL-MCNC: 92 MG/DL — SIGNIFICANT CHANGE UP (ref 70–99)
GLUCOSE UR-MCNC: NEGATIVE — SIGNIFICANT CHANGE UP
HCT VFR BLD CALC: 36 % — SIGNIFICANT CHANGE UP (ref 34.5–45)
HGB BLD-MCNC: 10.9 G/DL — LOW (ref 11.5–15.5)
IMM GRANULOCYTES # BLD AUTO: 0.02 # — SIGNIFICANT CHANGE UP
IMM GRANULOCYTES NFR BLD AUTO: 0.3 % — SIGNIFICANT CHANGE UP (ref 0–1.5)
INR BLD: 1.4 — HIGH (ref 0.88–1.17)
KETONES UR-MCNC: NEGATIVE — SIGNIFICANT CHANGE UP
LEUKOCYTE ESTERASE UR-ACNC: NEGATIVE — SIGNIFICANT CHANGE UP
LYMPHOCYTES # BLD AUTO: 1.36 K/UL — SIGNIFICANT CHANGE UP (ref 1–3.3)
LYMPHOCYTES # BLD AUTO: 21.7 % — SIGNIFICANT CHANGE UP (ref 13–44)
MCHC RBC-ENTMCNC: 23.9 PG — LOW (ref 27–34)
MCHC RBC-ENTMCNC: 30.3 % — LOW (ref 32–36)
MCV RBC AUTO: 78.9 FL — LOW (ref 80–100)
MONOCYTES # BLD AUTO: 0.49 K/UL — SIGNIFICANT CHANGE UP (ref 0–0.9)
MONOCYTES NFR BLD AUTO: 7.8 % — SIGNIFICANT CHANGE UP (ref 2–14)
MUCOUS THREADS # UR AUTO: SIGNIFICANT CHANGE UP
NEUTROPHILS # BLD AUTO: 4.28 K/UL — SIGNIFICANT CHANGE UP (ref 1.8–7.4)
NEUTROPHILS NFR BLD AUTO: 68.4 % — SIGNIFICANT CHANGE UP (ref 43–77)
NITRITE UR-MCNC: NEGATIVE — SIGNIFICANT CHANGE UP
NON-SQ EPI CELLS # UR AUTO: <1 — SIGNIFICANT CHANGE UP
NRBC # FLD: 0 — SIGNIFICANT CHANGE UP
PH UR: 6.5 — SIGNIFICANT CHANGE UP (ref 4.6–8)
PLATELET # BLD AUTO: 226 K/UL — SIGNIFICANT CHANGE UP (ref 150–400)
PMV BLD: 10.9 FL — SIGNIFICANT CHANGE UP (ref 7–13)
POTASSIUM SERPL-MCNC: 4.8 MMOL/L — SIGNIFICANT CHANGE UP (ref 3.5–5.3)
POTASSIUM SERPL-MCNC: SIGNIFICANT CHANGE UP MMOL/L (ref 3.5–5.3)
POTASSIUM SERPL-SCNC: 4.8 MMOL/L — SIGNIFICANT CHANGE UP (ref 3.5–5.3)
POTASSIUM SERPL-SCNC: SIGNIFICANT CHANGE UP MMOL/L (ref 3.5–5.3)
PROT SERPL-MCNC: SIGNIFICANT CHANGE UP G/DL (ref 6–8.3)
PROT UR-MCNC: 100 MG/DL — HIGH
PROTHROM AB SERPL-ACNC: 15.7 SEC — HIGH (ref 9.8–13.1)
RBC # BLD: 4.56 M/UL — SIGNIFICANT CHANGE UP (ref 3.8–5.2)
RBC # FLD: 15.8 % — HIGH (ref 10.3–14.5)
RBC CASTS # UR COMP ASSIST: SIGNIFICANT CHANGE UP (ref 0–?)
SODIUM SERPL-SCNC: 136 MMOL/L — SIGNIFICANT CHANGE UP (ref 135–145)
SODIUM SERPL-SCNC: 142 MMOL/L — SIGNIFICANT CHANGE UP (ref 135–145)
SP GR SPEC: 1.02 — SIGNIFICANT CHANGE UP (ref 1–1.04)
SQUAMOUS # UR AUTO: SIGNIFICANT CHANGE UP
UROBILINOGEN FLD QL: NORMAL MG/DL — SIGNIFICANT CHANGE UP
WBC # BLD: 6.26 K/UL — SIGNIFICANT CHANGE UP (ref 3.8–10.5)
WBC # FLD AUTO: 6.26 K/UL — SIGNIFICANT CHANGE UP (ref 3.8–10.5)
WBC UR QL: SIGNIFICANT CHANGE UP (ref 0–?)

## 2018-06-08 PROCEDURE — 99284 EMERGENCY DEPT VISIT MOD MDM: CPT | Mod: GC

## 2018-06-08 RX ORDER — SODIUM CHLORIDE 9 MG/ML
500 INJECTION INTRAMUSCULAR; INTRAVENOUS; SUBCUTANEOUS ONCE
Qty: 0 | Refills: 0 | Status: COMPLETED | OUTPATIENT
Start: 2018-06-08 | End: 2018-06-08

## 2018-06-08 RX ADMIN — SODIUM CHLORIDE 500 MILLILITER(S): 9 INJECTION INTRAMUSCULAR; INTRAVENOUS; SUBCUTANEOUS at 14:58

## 2018-06-08 NOTE — ED ADULT TRIAGE NOTE - CHIEF COMPLAINT QUOTE
Pt found unresponsive at home by daughter with  FS 20; 1 amp d50 given by ems //.  Pt took insulin this am with small amt of food Pt found unresponsive at home by daughter with  FS 20; 1 amp d50 given by ems //.  Pt took insulin this am with small amt of food.. IV 20 G LA noted Pt found unresponsive at home by daughter with  FS 20; 1 amp d50 given by ems //.  Pt took insulin this am with small amt of food.. IV 20 G LA noted.  FS 65 at triage Pt found unresponsive at home by daughter with  FS 20; 1 amp d50 given by ems //.  Pt took insulin this am with small amt of food.. IV 20 G LA noted.  FS 65 at triage.  Apple juice given at triage

## 2018-06-08 NOTE — ED PROVIDER NOTE - MEDICAL DECISION MAKING DETAILS
Cleo Traylor, DO: 78F with hypoglycemia. Will check labs to look for NUBIA as possible source of hypoglycemia, monitor & serial FS. Victor: 78F with hypoglycemia. Will check labs to look for NUBIA as possible source of hypoglycemia, monitor & serial FS.

## 2018-06-08 NOTE — ED PROVIDER NOTE - OBJECTIVE STATEMENT
Cleo Traylor, DO: 78F with hx of IDDM here for hypoglycemia. Patient was in usual state of health this AM, AM POC glucose 100 & given usual 10U Humalog around 8AM. At 11AM patient became more confused and complained of R sided weakness, EMS called and pt was unconscious on arrival with FS 20. 1 AMP D50 given by EMS. Denies fevers, nausea/vomiting, cough, rhinorrhea, CP/SOB, abdominal pain, diarrhea, urinary symptoms, vaginal bleeding or discharge. Patient recently d/c'd from hospital for foot surgery where it was recommended to d/c Humalog, f/u with o/p endocrinologist who recommended patient continue. Has been continuing for 2 weeks without issues.

## 2018-06-08 NOTE — ED ADULT NURSE NOTE - OBJECTIVE STATEMENT
78F with hx of IDDM here for hypoglycemia. Patient was in usual state of health this AM, AM POC glucose 100 & given usual 10U Humalog around 8AM then ate breakfast. At 11AM patient became more confused and complained of R sided weakness, EMS called and pt was unconscious on arrival with FS 20. 1 AMP D50 given by EMS. Denies fevers, nausea/vomiting, cough, rhinorrhea, CP/SOB, abdominal pain, diarrhea, urinary symptoms, vaginal bleeding or discharge. Patient recently d/c'd from hospital for foot surgery where it was recommended to d/c Humalog, f/u with o/p endocrinologist who recommended patient continue. Has been continuing for 2 weeks without issues. Family at bedside.

## 2018-06-08 NOTE — ED PROVIDER NOTE - PHYSICAL EXAMINATION
Cleo Traylor, DO:  Gen: Well appearing, NAD  Head: NCAT  HEENT: PERRL, MMM, normal conjunctiva, anicteric, neck supple  Lung: CTAB, no adventitious sounds  CV: RRR, no murmurs, rubs or gallops  Abd: soft, NTND, no rebound or guarding, no CVAT  MSK: No edema, no visible deformities  Neuro: CN II-XII intact. 5/5 strength and normal sensation in all extremities.   Skin: Warm and dry, no evidence of rash  Psych: normal mood and affect

## 2018-06-08 NOTE — ED PROVIDER NOTE - PROGRESS NOTE DETAILS
Cleo Traylor DO: Patient endorsed hematuria on urination. Pending UA results. Cleo Traylor, DO: UA negative for infection, pending culture results. Will d/c home on 5U Humalog before meals (half dose) and follow up with Endocrinologist.

## 2018-06-08 NOTE — ED ADULT NURSE NOTE - CHIEF COMPLAINT QUOTE
Pt found unresponsive at home by daughter with  FS 20; 1 amp d50 given by ems //.  Pt took insulin this am with small amt of food.. IV 20 G LA noted.  FS 65 at triage.  Apple juice given at triage

## 2018-06-10 LAB
BACTERIA UR CULT: SIGNIFICANT CHANGE UP
SPECIMEN SOURCE: SIGNIFICANT CHANGE UP

## 2018-07-06 ENCOUNTER — EMERGENCY (EMERGENCY)
Facility: HOSPITAL | Age: 78
LOS: 1 days | Discharge: HOME | End: 2018-07-06
Attending: EMERGENCY MEDICINE | Admitting: EMERGENCY MEDICINE
Payer: MEDICARE

## 2018-07-06 VITALS
DIASTOLIC BLOOD PRESSURE: 53 MMHG | TEMPERATURE: 98 F | SYSTOLIC BLOOD PRESSURE: 128 MMHG | RESPIRATION RATE: 16 BRPM | HEART RATE: 64 BPM | OXYGEN SATURATION: 100 %

## 2018-07-06 DIAGNOSIS — Z98.890 OTHER SPECIFIED POSTPROCEDURAL STATES: Chronic | ICD-10-CM

## 2018-07-06 DIAGNOSIS — Z95.1 PRESENCE OF AORTOCORONARY BYPASS GRAFT: Chronic | ICD-10-CM

## 2018-07-06 DIAGNOSIS — Z95.5 PRESENCE OF CORONARY ANGIOPLASTY IMPLANT AND GRAFT: Chronic | ICD-10-CM

## 2018-07-06 LAB
ALBUMIN SERPL ELPH-MCNC: 4 G/DL — SIGNIFICANT CHANGE UP (ref 3.3–5)
ALP SERPL-CCNC: 107 U/L — SIGNIFICANT CHANGE UP (ref 40–120)
ALT FLD-CCNC: 44 U/L — HIGH (ref 4–33)
AST SERPL-CCNC: 38 U/L — HIGH (ref 4–32)
BASOPHILS # BLD AUTO: 0.03 K/UL — SIGNIFICANT CHANGE UP (ref 0–0.2)
BASOPHILS NFR BLD AUTO: 0.5 % — SIGNIFICANT CHANGE UP (ref 0–2)
BILIRUB SERPL-MCNC: 0.5 MG/DL — SIGNIFICANT CHANGE UP (ref 0.2–1.2)
BUN SERPL-MCNC: 39 MG/DL — HIGH (ref 7–23)
CALCIUM SERPL-MCNC: 9.5 MG/DL — SIGNIFICANT CHANGE UP (ref 8.4–10.5)
CHLORIDE SERPL-SCNC: 103 MMOL/L — SIGNIFICANT CHANGE UP (ref 98–107)
CO2 SERPL-SCNC: 28 MMOL/L — SIGNIFICANT CHANGE UP (ref 22–31)
CREAT SERPL-MCNC: 1.2 MG/DL — SIGNIFICANT CHANGE UP (ref 0.5–1.3)
CRP SERPL-MCNC: < 4 MG/L — SIGNIFICANT CHANGE UP
EOSINOPHIL # BLD AUTO: 0.18 K/UL — SIGNIFICANT CHANGE UP (ref 0–0.5)
EOSINOPHIL NFR BLD AUTO: 3.1 % — SIGNIFICANT CHANGE UP (ref 0–6)
ERYTHROCYTE [SEDIMENTATION RATE] IN BLOOD: 78 MM/HR — HIGH (ref 4–25)
GLUCOSE SERPL-MCNC: 125 MG/DL — HIGH (ref 70–99)
HCT VFR BLD CALC: 34.5 % — SIGNIFICANT CHANGE UP (ref 34.5–45)
HGB BLD-MCNC: 10.8 G/DL — LOW (ref 11.5–15.5)
IMM GRANULOCYTES # BLD AUTO: 0.01 # — SIGNIFICANT CHANGE UP
IMM GRANULOCYTES NFR BLD AUTO: 0.2 % — SIGNIFICANT CHANGE UP (ref 0–1.5)
LACTATE BLDV-MCNC: 1.1 MMOL/L — SIGNIFICANT CHANGE UP (ref 0.5–2)
LYMPHOCYTES # BLD AUTO: 1.81 K/UL — SIGNIFICANT CHANGE UP (ref 1–3.3)
LYMPHOCYTES # BLD AUTO: 30.9 % — SIGNIFICANT CHANGE UP (ref 13–44)
MCHC RBC-ENTMCNC: 23.2 PG — LOW (ref 27–34)
MCHC RBC-ENTMCNC: 31.3 % — LOW (ref 32–36)
MCV RBC AUTO: 74 FL — LOW (ref 80–100)
MONOCYTES # BLD AUTO: 0.53 K/UL — SIGNIFICANT CHANGE UP (ref 0–0.9)
MONOCYTES NFR BLD AUTO: 9.1 % — SIGNIFICANT CHANGE UP (ref 2–14)
NEUTROPHILS # BLD AUTO: 3.29 K/UL — SIGNIFICANT CHANGE UP (ref 1.8–7.4)
NEUTROPHILS NFR BLD AUTO: 56.2 % — SIGNIFICANT CHANGE UP (ref 43–77)
NRBC # FLD: 0 — SIGNIFICANT CHANGE UP
PLATELET # BLD AUTO: 238 K/UL — SIGNIFICANT CHANGE UP (ref 150–400)
PMV BLD: 10.2 FL — SIGNIFICANT CHANGE UP (ref 7–13)
POTASSIUM SERPL-MCNC: 5 MMOL/L — SIGNIFICANT CHANGE UP (ref 3.5–5.3)
POTASSIUM SERPL-SCNC: 5 MMOL/L — SIGNIFICANT CHANGE UP (ref 3.5–5.3)
PROT SERPL-MCNC: 8 G/DL — SIGNIFICANT CHANGE UP (ref 6–8.3)
RBC # BLD: 4.66 M/UL — SIGNIFICANT CHANGE UP (ref 3.8–5.2)
RBC # FLD: 15.8 % — HIGH (ref 10.3–14.5)
SODIUM SERPL-SCNC: 138 MMOL/L — SIGNIFICANT CHANGE UP (ref 135–145)
WBC # BLD: 5.85 K/UL — SIGNIFICANT CHANGE UP (ref 3.8–10.5)
WBC # FLD AUTO: 5.85 K/UL — SIGNIFICANT CHANGE UP (ref 3.8–10.5)

## 2018-07-06 PROCEDURE — 99285 EMERGENCY DEPT VISIT HI MDM: CPT | Mod: GC

## 2018-07-06 PROCEDURE — 93971 EXTREMITY STUDY: CPT | Mod: 26,LT

## 2018-07-06 PROCEDURE — 73630 X-RAY EXAM OF FOOT: CPT | Mod: 26,RT

## 2018-07-06 RX ORDER — AZTREONAM 2 G
1 VIAL (EA) INJECTION
Qty: 10 | Refills: 0
Start: 2018-07-06 | End: 2018-07-10

## 2018-07-06 RX ORDER — GABAPENTIN 400 MG/1
1 CAPSULE ORAL
Qty: 42 | Refills: 0
Start: 2018-07-06 | End: 2018-07-19

## 2018-07-06 RX ORDER — GABAPENTIN 400 MG/1
300 CAPSULE ORAL ONCE
Qty: 0 | Refills: 0 | Status: COMPLETED | OUTPATIENT
Start: 2018-07-06 | End: 2018-07-06

## 2018-07-06 RX ORDER — OXYCODONE HYDROCHLORIDE 5 MG/1
5 TABLET ORAL ONCE
Qty: 0 | Refills: 0 | Status: DISCONTINUED | OUTPATIENT
Start: 2018-07-06 | End: 2018-07-06

## 2018-07-06 RX ADMIN — GABAPENTIN 300 MILLIGRAM(S): 400 CAPSULE ORAL at 18:28

## 2018-07-06 RX ADMIN — Medication 1 TABLET(S): at 18:28

## 2018-07-06 RX ADMIN — OXYCODONE HYDROCHLORIDE 5 MILLIGRAM(S): 5 TABLET ORAL at 18:28

## 2018-07-06 NOTE — CONSULT NOTE ADULT - SUBJECTIVE AND OBJECTIVE BOX
Patient is a 78y old  Female who presents with a chief complaint of right foot pain    HPI:  79yo female pmh IDDM, HTN, HLD, afib on eliquis p/w right foot pain x 3 months, worsened over the past two weeks. Also c/o worsening numbness of right foot. Recent "osteomyelitis" of right 1st toe, received abx and nail removal 6 weeks ago. Podiatrist recommended neurology follow up, has the appointment in 3 days. Taking Tylenol and percocet for pain with minimal relief. Denies fevers, n/v/d, chest pain, dyspnea, abdominal pain, recent long travel, LE swelling or calf pain. On lyrica 50mg QHS      PAST MEDICAL & SURGICAL HISTORY:  MI (myocardial infarction)  CAD (coronary artery disease)  Hypertension  Diabetes  Hyperlipemia  Diabetes type 2, uncontrolled  CAD (coronary artery disease)  H/O: hysterectomy  S/P coronary artery stent placement  S/P CABG x 3      MEDICATIONS  (STANDING):  gabapentin 300 milliGRAM(s) Oral Once  oxyCODONE    IR 5 milliGRAM(s) Oral Once  trimethoprim  160 mG/sulfamethoxazole 800 mG 1 Tablet(s) Oral Once    MEDICATIONS  (PRN):      Allergies    No Known Allergies    Intolerances        VITALS:    Vital Signs Last 24 Hrs  T(C): 36.7 (06 Jul 2018 11:44), Max: 36.7 (06 Jul 2018 11:44)  T(F): 98.1 (06 Jul 2018 11:44), Max: 98.1 (06 Jul 2018 11:44)  HR: 64 (06 Jul 2018 11:44) (64 - 64)  BP: 128/53 (06 Jul 2018 11:44) (128/53 - 128/53)  BP(mean): --  RR: 16 (06 Jul 2018 11:44) (16 - 16)  SpO2: 100% (06 Jul 2018 11:44) (100% - 100%)    LABS:                          10.8   5.85  )-----------( 238      ( 06 Jul 2018 14:20 )             34.5       07-06    138  |  103  |  39<H>  ----------------------------<  125<H>  5.0   |  28  |  1.20    Ca    9.5      06 Jul 2018 14:20    TPro  8.0  /  Alb  4.0  /  TBili  0.5  /  DBili  x   /  AST  38<H>  /  ALT  44<H>  /  AlkPhos  107  07-06      CAPILLARY BLOOD GLUCOSE      POCT Blood Glucose.: 126 mg/dL (06 Jul 2018 11:47)          LOWER EXTREMITY PHYSICAL EXAM:    Vascular: DP/PT 1/4, B/L, CFT <5 seconds B/L, Temperature gradient WNL   Neuro: Epicritic sensation diminished to the level of midfoot, B/L.  Musculoskeletal/Ortho: no pain on palpation of right 2nd toe.    RADIOLOGY:  no gas or osteo on xrays, only free air in hallux (resident read)

## 2018-07-06 NOTE — ED PROVIDER NOTE - MEDICAL DECISION MAKING DETAILS
78F DM HTN CAD afib p/w right foot pain x months, worse last two weeks. Has neuro appointment on Monday. Mild right 2nd toe swelling. No skin changes. Xray toes, labs with esr, crp. If negative, will discharge on gabapentin with increasing dose and d/c pregabalin.

## 2018-07-06 NOTE — CONSULT NOTE ADULT - ASSESSMENT
79 y/o female pt with right foot 2nd toe pain  - pt seen and evaluated   - pt was previously seen by Dr. Pedro on Monday as outpatient who referred pt to neurology  - pt consistent with neuropathic pain  - rec outpt follow up with neurology  - will d/w attending

## 2018-07-06 NOTE — ED PROVIDER NOTE - PROGRESS NOTE DETAILS
DP and PT pulses auscultated by doppler. RAMIRO 0.95 in right lower extremity. Pending DVT study. Will give home percocet and gabapentin DVT study negative. Pain is well controlled. Will discharge on Bactrim and gabapentin. Discussed stopping taking the pregabalin. Patient and her daughter verbalized understanding. Patient informed of ED visit findings, understands plan.  Patient provided with written and further verbal instructions not included in discharge paperwork.  Patient instructed to follow up with their primary care physician in 2-3 days and return for new, worsened, or persistent symptoms. DVT study negative. Xray with questionable subcutaneous air in right first toe, which is not the toe in causing pain and can be due to recent toe nail removal and surgical intervention. Pain is well controlled. Will discharge on Bactrim and gabapentin. Discussed stopping taking the pregabalin. Patient and her daughter verbalized understanding. Patient informed of ED visit findings, understands plan.  Patient provided with written and further verbal instructions not included in discharge paperwork.  Patient instructed to follow up with their primary care physician in 2-3 days and return for new, worsened, or persistent symptoms. Marcella MAC- PT SEEN BY HUA, RECOMMEND, NO ACUTE INTERVENTION  and no concerns abut xray finding of great toe, discharged home

## 2018-07-06 NOTE — ED ADULT TRIAGE NOTE - CHIEF COMPLAINT QUOTE
Pt c/o pain to right foot x 1 year worsening x 1 week, seen by MD and told to f/u w neuro, pt has appointment on Monday.

## 2018-07-06 NOTE — ED PROVIDER NOTE - ATTENDING CONTRIBUTION TO CARE
Marcella MAC- 77 Y/O F with h/o Insulin dependent DM, HTN, Afib on eliquis, chronic foot neuropathy b/l p/w worsening rt foot pain on the dorsal aspect , no fall or trauma that she call recall but it going on for few weeks and gets swollen intermittently and they keep It elevated with mild relief. No fever, chills, diarrhea.    pt is alert, well appearing female, s1s2 normal reg, b/l clear breath sounds, abd soft, nt, nd, normal bowel sounds, neuro exam aox3, cn 2-12 intact, skin warm, dry, good turgor, b/l foot has mod pulse , normal temp but signs of chronic poor vasculature like darker skin as compared to leg, poor texture, rt foot also has hyperesthesia, fungal toe nail infection with scaling of nails. Full rom at all joints, rt second toe appears swollen and inflamed but no redness, no intertoe lesions noted    plan to check labs, r/o dvt and also do vascular study for arterial function of rt leg will do xr rt foot to r/o fx vs free air

## 2018-07-31 ENCOUNTER — EMERGENCY (EMERGENCY)
Facility: HOSPITAL | Age: 78
LOS: 1 days | Discharge: HOME | End: 2018-07-31
Attending: EMERGENCY MEDICINE | Admitting: EMERGENCY MEDICINE
Payer: MEDICARE

## 2018-07-31 VITALS
SYSTOLIC BLOOD PRESSURE: 129 MMHG | TEMPERATURE: 98 F | DIASTOLIC BLOOD PRESSURE: 56 MMHG | OXYGEN SATURATION: 99 % | HEART RATE: 74 BPM | RESPIRATION RATE: 16 BRPM

## 2018-07-31 VITALS
DIASTOLIC BLOOD PRESSURE: 53 MMHG | HEART RATE: 92 BPM | OXYGEN SATURATION: 100 % | RESPIRATION RATE: 18 BRPM | SYSTOLIC BLOOD PRESSURE: 152 MMHG

## 2018-07-31 DIAGNOSIS — Z95.5 PRESENCE OF CORONARY ANGIOPLASTY IMPLANT AND GRAFT: Chronic | ICD-10-CM

## 2018-07-31 DIAGNOSIS — Z98.890 OTHER SPECIFIED POSTPROCEDURAL STATES: Chronic | ICD-10-CM

## 2018-07-31 DIAGNOSIS — Z95.1 PRESENCE OF AORTOCORONARY BYPASS GRAFT: Chronic | ICD-10-CM

## 2018-07-31 LAB
ALBUMIN SERPL ELPH-MCNC: 4 G/DL — SIGNIFICANT CHANGE UP (ref 3.3–5)
ALP SERPL-CCNC: 111 U/L — SIGNIFICANT CHANGE UP (ref 40–120)
ALT FLD-CCNC: 34 U/L — HIGH (ref 4–33)
AST SERPL-CCNC: 28 U/L — SIGNIFICANT CHANGE UP (ref 4–32)
BASOPHILS # BLD AUTO: 0.03 K/UL — SIGNIFICANT CHANGE UP (ref 0–0.2)
BASOPHILS NFR BLD AUTO: 0.4 % — SIGNIFICANT CHANGE UP (ref 0–2)
BILIRUB SERPL-MCNC: 0.3 MG/DL — SIGNIFICANT CHANGE UP (ref 0.2–1.2)
BUN SERPL-MCNC: 46 MG/DL — HIGH (ref 7–23)
CALCIUM SERPL-MCNC: 9.5 MG/DL — SIGNIFICANT CHANGE UP (ref 8.4–10.5)
CHLORIDE SERPL-SCNC: 101 MMOL/L — SIGNIFICANT CHANGE UP (ref 98–107)
CO2 SERPL-SCNC: 26 MMOL/L — SIGNIFICANT CHANGE UP (ref 22–31)
CREAT SERPL-MCNC: 1.52 MG/DL — HIGH (ref 0.5–1.3)
EOSINOPHIL # BLD AUTO: 0.3 K/UL — SIGNIFICANT CHANGE UP (ref 0–0.5)
EOSINOPHIL NFR BLD AUTO: 4.5 % — SIGNIFICANT CHANGE UP (ref 0–6)
GLUCOSE SERPL-MCNC: 199 MG/DL — HIGH (ref 70–99)
HCT VFR BLD CALC: 35.1 % — SIGNIFICANT CHANGE UP (ref 34.5–45)
HGB BLD-MCNC: 10.8 G/DL — LOW (ref 11.5–15.5)
IMM GRANULOCYTES # BLD AUTO: 0.01 # — SIGNIFICANT CHANGE UP
IMM GRANULOCYTES NFR BLD AUTO: 0.1 % — SIGNIFICANT CHANGE UP (ref 0–1.5)
LYMPHOCYTES # BLD AUTO: 2.4 K/UL — SIGNIFICANT CHANGE UP (ref 1–3.3)
LYMPHOCYTES # BLD AUTO: 35.8 % — SIGNIFICANT CHANGE UP (ref 13–44)
MCHC RBC-ENTMCNC: 23.7 PG — LOW (ref 27–34)
MCHC RBC-ENTMCNC: 30.8 % — LOW (ref 32–36)
MCV RBC AUTO: 77 FL — LOW (ref 80–100)
MONOCYTES # BLD AUTO: 0.6 K/UL — SIGNIFICANT CHANGE UP (ref 0–0.9)
MONOCYTES NFR BLD AUTO: 8.9 % — SIGNIFICANT CHANGE UP (ref 2–14)
NEUTROPHILS # BLD AUTO: 3.37 K/UL — SIGNIFICANT CHANGE UP (ref 1.8–7.4)
NEUTROPHILS NFR BLD AUTO: 50.3 % — SIGNIFICANT CHANGE UP (ref 43–77)
NRBC # FLD: 0 — SIGNIFICANT CHANGE UP
PLATELET # BLD AUTO: 229 K/UL — SIGNIFICANT CHANGE UP (ref 150–400)
PMV BLD: 10.5 FL — SIGNIFICANT CHANGE UP (ref 7–13)
POTASSIUM SERPL-MCNC: 4.9 MMOL/L — SIGNIFICANT CHANGE UP (ref 3.5–5.3)
POTASSIUM SERPL-SCNC: 4.9 MMOL/L — SIGNIFICANT CHANGE UP (ref 3.5–5.3)
PROT SERPL-MCNC: 8.2 G/DL — SIGNIFICANT CHANGE UP (ref 6–8.3)
RBC # BLD: 4.56 M/UL — SIGNIFICANT CHANGE UP (ref 3.8–5.2)
RBC # FLD: 16.9 % — HIGH (ref 10.3–14.5)
SODIUM SERPL-SCNC: 139 MMOL/L — SIGNIFICANT CHANGE UP (ref 135–145)
WBC # BLD: 6.71 K/UL — SIGNIFICANT CHANGE UP (ref 3.8–10.5)
WBC # FLD AUTO: 6.71 K/UL — SIGNIFICANT CHANGE UP (ref 3.8–10.5)

## 2018-07-31 PROCEDURE — 99284 EMERGENCY DEPT VISIT MOD MDM: CPT | Mod: GC

## 2018-07-31 NOTE — ED PROVIDER NOTE - FAMILY HISTORY
No pertinent family history in first degree relatives     No pertinent family history in first degree relatives
No pertinent family history in first degree relatives     No pertinent family history in first degree relatives

## 2018-07-31 NOTE — ED PROVIDER NOTE - OBJECTIVE STATEMENT
78F, PMH of HTN, CAD sent in for elevated potassium. Patient was at a routine cardiology appointment and was found to have potassium elevated to 6. Denies any chest pain, difficulty breathing, headache, dizziness, abdominal pain. Reports bilateral lower leg swelling.
Patient states cardiologist recommended evaluation at ED for a potassium of 7.1 that was drawn yesterday. Patient states she feels well and is in normal health. Patient was at cardiology for a routine visit. Patient endorses pain in both lower extremities but states she had chronic leg pain and swelling. Patient has no acute complaints at this time and feels in good health. Cardiologist notified that patient is in ED.

## 2018-07-31 NOTE — ED PROVIDER NOTE - PMH
CAD (coronary artery disease)    CAD (coronary artery disease)    Diabetes    Diabetes type 2, uncontrolled    Hyperlipemia    Hypertension    MI (myocardial infarction)
CAD (coronary artery disease)    CAD (coronary artery disease)    Diabetes    Diabetes type 2, uncontrolled    Hyperlipemia    Hypertension    MI (myocardial infarction)

## 2018-07-31 NOTE — ED PROVIDER NOTE - ATTENDING CONTRIBUTION TO CARE
I, Corky Aldana MD, personally saw the patient with the resident, and completed the key components of the history and physical exam. I then discussed the management plan with the resident.    78F, PMH of HTN, CAD sent in for elevated potassium.  Pt on routine blood work noted to have elevated potassium.  Asymptomatic @ time of exam.  LE edema to baseline.    ***GEN - NAD; well appearing; A+O x3 ***HEAD - NC/AT   ***PULMONARY - CTA b/l, symmetric breath sounds. ***CARDIAC -s1s2, RRR, no M,G,R  ***ABDOMEN - ND, NT, soft, no guarding, no rebound, no masses    ***SKIN - no rash or bruising   ***NEUROLOGIC - alert and oriented, follows commands, sensation nl, motor nl, gait nl, ***PSYCH - insight and judgment nl, memory nl, affect nl, thought nl    pt w/ elevated K+ on outpatient testing, will rpt.

## 2018-07-31 NOTE — ED PROVIDER NOTE - CARE PLAN
Principal Discharge DX:	Potassium serum increased  Assessment and plan of treatment:	Please follow-up with your primary care doctor in the next 24-48 hours   If you have an headache, chest pain or difficulty breathing please return to the emergency department

## 2018-07-31 NOTE — ED ADULT NURSE NOTE - NSIMPLEMENTINTERV_GEN_ALL_ED
Implemented All Fall Risk Interventions:  [place] to call system. Call bell, personal items and telephone within reach. Instruct patient to call for assistance. Room bathroom lighting operational. Non-slip footwear when patient is off stretcher. Physically safe environment: no spills, clutter or unnecessary equipment. Stretcher in lowest position, wheels locked, appropriate side rails in place. Provide visual cue, wrist band, yellow gown, etc. Monitor gait and stability. Monitor for mental status changes and reorient to person, place, and time. Review medications for side effects contributing to fall risk. Reinforce activity limits and safety measures with patient and family.

## 2018-07-31 NOTE — ED SUB INTERN NOTE - MUSCULOSKELETAL NEGATIVE STATEMENT, MLM
no back pain, no gout, no neck pain, and no weakness. Positive leg pain lower extremities bilaterally. Positive leg swelling.

## 2018-07-31 NOTE — ED SUB INTERN NOTE - OBJECTIVE STATEMENT FT
Patient is a 78 year old female with PMH of MI, CABG, CAD, came to ED with son at request of her cardiologist. Cardiologist notified her of a potassium of 7.1 from labs drawn yesterday at a routine visit. The patient states she is in her normal state of health and denies any illness or new problems. The patient states both of her lower legs are swollen and painful, but this is her baseline. The patient denies shortness of breath, chest pain, syncopal episodes, recent illness, pain,  and abdominal pain. Patient is complaint with medications and regularly sees PCP and Cardiology.

## 2018-07-31 NOTE — ED ADULT NURSE NOTE - CHIEF COMPLAINT QUOTE
sent by cardiologist Dr. Lunsford [phone], for abnormal lab results.  Patient unaware of what lab is abnormal.  Denies any SOB or CP.

## 2018-07-31 NOTE — ED PROVIDER NOTE - CHIEF COMPLAINT
The patient is a 78y Female complaining of abnormal lab result.
The patient is a 78y Female that presents with an elevated potassium level per cardiologist.

## 2018-07-31 NOTE — ED PROVIDER NOTE - PHYSICAL EXAMINATION
General: well appearing female, no acute distress   HEENT: normoceophalic, atraumatic   Respiratory: normal work General: well appearing female, no acute distress   HEENT: normoceophalic, atraumatic   Respiratory: normal work, of breathing, lungs clear to auscultation bilaterally   Cardiac: regular rate and rhythm   Abdomen: soft, non-tender   MSK: no tendeness of lower extremities   Skin: no rashes   Neuro: A&Ox3

## 2018-07-31 NOTE — ED SUB INTERN NOTE - MEDICAL DECISION MAKING DETAILS
Patient was reported to have an elevated potassium from a blood draw at routine cardiology visit. Patient has no current complaints or symptoms. A current potassium should be drawn and and EKG should be done to check for any abnormalities.

## 2018-07-31 NOTE — ED PROVIDER NOTE - PROGRESS NOTE DETAILS
patient and family updated on results. patient given jug of water for NUBIA. will discharge. - resident Jean Chavez

## 2018-07-31 NOTE — ED PROVIDER NOTE - PSH
H/O: hysterectomy    S/P CABG x 3    S/P coronary artery stent placement
H/O: hysterectomy    S/P CABG x 3    S/P coronary artery stent placement

## 2018-07-31 NOTE — ED ADULT TRIAGE NOTE - CHIEF COMPLAINT QUOTE
sent by cardiologist Dr. Lunsford [phone], for abnormal lab results.  Patient unaware of what lab is abnormal.  Denies any SOB or CP.

## 2018-07-31 NOTE — ED PROVIDER NOTE - PLAN OF CARE
Please follow-up with your primary care doctor in the next 24-48 hours   If you have an headache, chest pain or difficulty breathing please return to the emergency department

## 2018-07-31 NOTE — ED ADULT NURSE NOTE - OBJECTIVE STATEMENT
pt received to room 17, A&Ox3, ambulatory with a cane, sent by cardiologist for evaluation of an abnormal lab result. further investigation reveals the lab result to be potassium. pt denies chest pain, SOB, abdominal pain, nausea, vomiting, diarrhea. lung sounds clear and equal bilaterally, respirations equal and non labored. abdomen soft and non tender, non distended. saline lock placed, labs sent. MD at bedside.

## 2019-02-14 NOTE — ED ADULT TRIAGE NOTE - NS ED NOTE AC HIGH RISK COUNTRIES
No
FLU A B RSV Detection by PCR (02.14.19 @ 15:28)    Flu A Result: Positive: MARION Olson  Negative results do not preclude influenza infection and  should not be used as the sole basis for treatment or  other patient management decisions.  A positive result may occur in the absence of viable virus.  By: [employer] Flu viral assay by Reverse Transcriptase  Polymerase Chain Reaction (RT-PCR).    Flu B Result: Negative: MARION Olson    RSV Result: Negative: MARION Olson    Comprehensive Metabolic Panel (02.14.19 @ 15:25)    Sodium, Serum: 137 mmol/L    Potassium, Serum: 4.0 mmol/L    Chloride, Serum: 98 mmol/L    Carbon Dioxide, Serum: 24 mmol/L    Anion Gap, Serum: 15 mmol/L    Blood Urea Nitrogen, Serum: 10 mg/dL    Creatinine, Serum: <0.5 mg/dL    Glucose, Serum: 108 mg/dL    Calcium, Total Serum: 9.2 mg/dL    Protein Total, Serum: 6.4 g/dL    Albumin, Serum: 4.2 g/dL    Bilirubin Total, Serum: 0.2 mg/dL    Alkaline Phosphatase, Serum: 150 U/L    Aspartate Aminotransferase (AST/SGOT): 27 U/L    Alanine Aminotransferase (ALT/SGPT): 12 U/L    Complete Blood Count + Automated Diff (02.14.19 @ 15:25)    WBC Count: 4.10 K/uL    RBC Count: 4.23 M/uL    Hemoglobin: 11.8 g/dL    Hematocrit: 33.8 %    Mean Cell Volume: 79.9 fL    Mean Cell Hemoglobin: 27.9 pg    Mean Cell Hemoglobin Conc: 34.9 g/dL    Red Cell Distrib Width: 13.0 %    Platelet Count - Automated: 232 K/uL    Auto Neutrophil #: 3.28 K/uL    Auto Lymphocyte #: 0.48 K/uL    Auto Monocyte #: 0.33 K/uL    Auto Eosinophil #: 0.00 K/uL    Auto Basophil #: 0.00 K/uL    Auto Neutrophil %: 80.1: Differential percentages must be correlated with absolute numbers for  clinical significance. %    Auto Lymphocyte %: 11.7 %    Auto Monocyte %: 8.0 %    Auto Eosinophil %: 0.0 %    Auto Basophil %: 0.0 %    Auto Immature Granulocyte %: 0.2 %    Nucleated RBC: 0 /100 WBCs

## 2019-03-18 NOTE — ED ADULT TRIAGE NOTE - TEMPERATURE IN FAHRENHEIT (DEGREES F)
You have been set up with a 48 Hour Holter Monitor today. Remember you must wear this until it turns off. The screen will go blank. The monitor will not make any noise or vibrate. Remove on: 3/20/2019 at 915 a.m. You MAY exercise, attend work, and drive while wearing the monitor. You MAY NOT shower, bathe, or swim while wearing the monitor. The monitor CAN NOT get wet. REMINDER: You may receive a phone call, email or text message reminding you of an appointment to return the monitor. Please ignore this notification as you do NOT have that appointment. RETURN the monitor along with your diary of recorded symptoms to [facility] Cardiology Department check in desk as soon as the monitor TURNS OFF. Should you have any questions, do not hesitate to call [phone]. Ask for the Cardiology Phone Nurse. [phone]

## 2019-06-01 NOTE — DISCHARGE NOTE ADULT - DO YOU HAVE DIFFICULTY CLIMBING STAIRS
A/P  32yo s/p c/s, POD #2, stable and meeting postoperative milestones appropriately.   1. Pain: controlled with OPM.   2. GI: Regular diet as tolerated   3. : voiding   4. DVT prophylaxis: ambulation, Subcutaneous heparin   5. Dispo: Likely POD3 or 4
A/P  34yo s/p c/s, POD #1, stable and meeting postoperative milestones appropriately.   1. Pain: controlled with OPM.   2. GI: Regular diet as tolerated   3. : voiding   4. DVT prophylaxis: ambulation, Subcutaneous heparin   5. Dispo: Likely POD3 or 4
A/P  34yo s/p c/s, POD #3, stable and meeting postoperative milestones appropriately.   1. Pain: controlled with OPM.   2. GI: Regular diet as tolerated   3. : voiding   4. DVT prophylaxis: ambulation, Subcutaneous heparin   5. Dispo: Likely POD3 or 4
A/P  yo 33y P1   s/p c/s, POD #2 ,stable  1. Pain: po pain meds  2. GI: regular diet	  3. : voiding freely  4. DVT prophylaxis: SQH  5. Dispo: Discharge home POD 3 or 4 if patient remains hemodynamically stable
A/P  yo 33y s/p c/s, POD #3  ,stable  1. Pain: po pain meds  2. GI: regular diet  3. : voiding freely  4. DVT prophylaxis: SQH, ambulating  5. Dispo: Discharge home today
Yes

## 2020-07-20 VITALS
DIASTOLIC BLOOD PRESSURE: 71 MMHG | TEMPERATURE: 100 F | SYSTOLIC BLOOD PRESSURE: 180 MMHG | HEART RATE: 62 BPM | RESPIRATION RATE: 18 BRPM | OXYGEN SATURATION: 99 %

## 2020-07-20 RX ORDER — CHLORHEXIDINE GLUCONATE 213 G/1000ML
1 SOLUTION TOPICAL ONCE
Refills: 0 | Status: DISCONTINUED | OUTPATIENT
Start: 2020-07-29 | End: 2020-07-29

## 2020-07-20 NOTE — H&P ADULT - HISTORY OF PRESENT ILLNESS
[facility]  VERIFY MEDS  COVID ___  Cardiologist: Dr. Thayer  Pharmacy:  Escort:    81 yo F with PMH of HTN, HLD, AF (on Eliquis), IDDM with neuropathy, CAD s/p cardiac cath 4/2018 @ [facility] (pLAD 20% ISR, oD2 60%, dOM3 small with 99% ISR but supplied by collaterals from [facility], mRCA 30% with plan for aggressive medical management) , chronic systolic CHF (EF 40-45% by ECHO 4/2018), osteomyelitis of  R 1st  and 2nd toe with gangrene 4/2018 s/p toenail removal and s/p 6 week abx course who presented to Cardiologist Dr. Thayer with c/o ___.     Denies CP, SOB, dizziness, diaphoresis, palpitations, fatigue, orthopnea, PND, syncope, N/V, abdominal pain.     NST 4/22/18: large, moderate to severe defects in apical/inferior/inferolateral walls that are partially reversible suggestive of infarction with moderate per-infarct ischemia, LVEF 4%. TTE 4/22/18: mild MR, mildly dilated LA, moderate segmental LV systolic dysfunction,  LVEF 40-45%, inferior and inferolateral walls severe hypokinetic, mild [place].     In light of pt's risk factors, CCS Anginal Class ___ Sx, known h/o CAD, pt referred for cardiac cath with possible intervention to r/o progressive CAD.     Cath hx:  Cardiac cath at [facility] 4/24/18: LMCA normal, pLAD 20% ISR, oD2 60%, dOM3 (small) 99% ISR but supplied by collaterals from the [facility] (likely culprit for abnormal NST), mRCA 30%, plan for aggressive medical management. VERIFY MEDS  Patient has not gone for covid test yet, unlikely patient will go.   Cardiologist: Dr. Nolvia Lunsford  Pharmacy:  Pt unsure.   Escort: [facility], [employer], son also available per pt    81 yo F, POOR HISTORIAN, walks with walker with PMH of HTN, HLD, AF (on Eliquis 1x/d only per pt, last dose 7/20 AM), IDDM with neuropathy, CAD s/p cardiac cath 4/2018 @ [facility] (pLAD 20% ISR, oD2 60%, dOM3 small with 99% ISR but supplied by collaterals from [facility], mRCA 30% with plan for aggressive medical management) , chronic systolic CHF (EF 40-45% by ECHO 4/2018), osteomyelitis of  R 1st  and 2nd toe with gangrene 4/2018 s/p toenail removal and s/p 6 week abx course who presented to Cardiologist Dr. Thayer with c/o pain under L breast described as "wiggling like a worm" radiating to L arm and of mild intensity occurring independent of exertion, lasting couple minutes before self-resolving, over past 3 weeks.  Denies SOB, dizziness, diaphoresis, palpitations, fatigue, orthopnea, PND, syncope, N/V, abdominal pain.   NST 4/22/18: large, moderate to severe defects in apical/inferior/inferolateral walls that are partially reversible suggestive of infarction with moderate per-infarct ischemia, LVEF 4%. TTE 4/22/18: mild MR, mildly dilated LA, moderate segmental LV systolic dysfunction,  LVEF 40-45%, inferior and inferolateral walls severe hypokinetic, mild [place].   In light of pt's risk factors, CCS Anginal Class IV Sx, known h/o CAD, pt referred for cardiac cath with possible intervention to r/o progressive CAD.     Cath hx:  Cardiac cath at [facility] 4/24/18: LMCA normal, pLAD 20% ISR, oD2 60%, dOM3 (small) 99% ISR but supplied by collaterals from the [facility] (likely culprit for abnormal NST), mRCA 30%, plan for aggressive medical management. VERIFY MEDS  Patient has not gone for covid test yet, unlikely patient will go - very poor historian, please call pt's son to discuss Covid testing.   Cardiologist: Dr. Nolvia Lunsford  Pharmacy:  Pt unsure.   Escort: [facility], [employer], son also available per pt    79 yo F, POOR HISTORIAN, ?medication compliance, walks with walker with PMHx of HTN, HLD, AF (on Eliquis 1x/d only per pt, last dose 7/20 AM), IDDM with neuropathy, CAD s/p cardiac cath 4/2018 @ [facility] (pLAD 20% ISR, oD2 60%, dOM3 small with 99% ISR but supplied by collaterals from [facility], mRCA 30% with plan for aggressive medical management) , chronic systolic CHF (EF 40-45% by ECHO 4/2018), osteomyelitis of  R 1st  and 2nd toe with gangrene 4/2018 s/p toenail removal and s/p 6 week abx course who presented to Cardiologist Dr. De Souza with c/o pain under L breast described as "wiggling like a worm" radiating to L arm and of mild intensity occurring independent of exertion, lasting couple minutes before self-resolving, over past 3 weeks.  Denies SOB, dizziness, diaphoresis, palpitations, fatigue, orthopnea, PND, syncope, N/V, abdominal pain.   NST 4/22/18: large, moderate to severe defects in apical/inferior/inferolateral walls that are partially reversible suggestive of infarction with moderate per-infarct ischemia, LVEF 4%. TTE 4/22/18: mild MR, mildly dilated LA, moderate segmental LV systolic dysfunction,  LVEF 40-45%, inferior and inferolateral walls severe hypokinetic, mild ID.   In light of pt's risk factors, CCS Anginal Class IV Sx, known h/o CAD, pt referred for cardiac cath with possible intervention to r/o progressive CAD.     Cath hx:  Cardiac cath at [facility] 4/24/18: LMCA normal, pLAD 20% ISR, oD2 60%, dOM3 (small) 99% ISR but supplied by collaterals from the [facility] (likely culprit for abnormal NST), mRCA 30%, plan for aggressive medical management. VERIFY MEDS  Patient has not gone for covid test yet, unlikely patient will go - very poor historian, please call pt's son to discuss Covid testing (did not answer).  Also to make sure he reminds patient to avoid Eliquis and to bring in medications.   Cardiologist: Dr. Nolvia Lunsford  Pharmacy:  Pt unsure.   Escort: [facility]HA, [employer], son also available per pt    81 yo F, POOR HISTORIAN, ?medication compliance, walks with walker with PMHx of HTN, HLD, AF (on Eliquis 1x/d only per pt, last dose 7/20 AM), IDDM with neuropathy, CAD s/p cardiac cath 4/2018 @ [facility] (pLAD 20% ISR, oD2 60%, dOM3 small with 99% ISR but supplied by collaterals from [facility], mRCA 30% with plan for aggressive medical management) , chronic systolic CHF (EF 40-45% by ECHO 4/2018), osteomyelitis of  R 1st  and 2nd toe with gangrene 4/2018 s/p toenail removal and s/p 6 week abx course who presented to Cardiologist Dr. De Souza with c/o pain under L breast described as "wiggling like a worm" radiating to L arm and of mild intensity occurring independent of exertion, lasting couple minutes before self-resolving, over past 3 weeks.  Denies SOB, dizziness, diaphoresis, palpitations, fatigue, orthopnea, PND, syncope, N/V, abdominal pain.   NST 4/22/18: large, moderate to severe defects in apical/inferior/inferolateral walls that are partially reversible suggestive of infarction with moderate per-infarct ischemia, LVEF 4%. TTE 4/22/18: mild MR, mildly dilated LA, moderate segmental LV systolic dysfunction,  LVEF 40-45%, inferior and inferolateral walls severe hypokinetic, mild CA.   In light of pt's risk factors, CCS Anginal Class IV Sx, known h/o CAD, pt referred for cardiac cath with possible intervention to r/o progressive CAD.     Cath hx:  Cardiac cath at [facility] 4/24/18: LMCA normal, pLAD 20% ISR, oD2 60%, dOM3 (small) 99% ISR but supplied by collaterals from the [facility] (likely culprit for abnormal NST), mRCA 30%, plan for aggressive medical management. Cardiologist: Dr. Nolvia Lunsford  Pharmacy:  Pt unsure.   Escort: [facility], [employer], son also available per pt  81 yo F, POOR HISTORIAN, ?medication compliance, walks with walker with PMHx of HTN, HLD, AF (on Eliquis 1x/d only per pt, last dose 7/20 AM), IDDM with neuropathy, CAD s/p cardiac cath 4/2018 @ [facility] (pLAD 20% ISR, oD2 60%, dOM3 small with 99% ISR but supplied by collaterals from [facility], mRCA 30% with plan for aggressive medical management) , chronic systolic CHF (EF 40-45% by ECHO 4/20 18), osteomyelitis of  R 1st  and 2nd toe with gangrene 4/2018 s/p toenail removal and s/p 6 week abx course who presented to Cardiologist Dr. De Souza with c/o pain under L breast described as "wiggling like a worm" radiating to L arm and of mild intensity occurring independent of exertion, lasting couple minutes before self-resolving, over past 3 weeks.  Denies SOB, dizziness, diaphoresis, palpitations, fatigue, orthopnea, PND, syncope, N/V, abdominal pain.   NST 4/22/18: large, moderate to severe defects in apical/inferior/inferolateral walls that are partially reversible suggestive of infarction with moderate per-infarct ischemia, LVEF 4%. TTE 4/22/18: mild MR, mildly dilated LA, moderate segmental LV systolic dysfunction,  LVEF 40-45%, inferior and inferolateral walls severe hypokinetic, mild [place].   In light of pt's risk factors, CCS Anginal Class IV Sx, known h/o CAD, pt referred for cardiac cath with possible intervention to r/o progressive CAD.     Cath hx:  Cardiac cath at [facility] 4/24/18: LMCA normal, pLAD 20% ISR, oD2 60%, dOM3 (small) 99% ISR but supplied by collaterals from the [facility] (likely culprit for abnormal NST), mRCA 30%, plan for aggressive medical management.

## 2020-07-20 NOTE — H&P ADULT - NSICDXPASTMEDICALHX_GEN_ALL_CORE_FT
PAST MEDICAL HISTORY:  Chronic systolic HF (heart failure)     Coronary artery disease     DM (diabetes mellitus)     H/O osteomyelitis 2018    Hyperlipemia     Hypertension PAST MEDICAL HISTORY:  AF (atrial fibrillation)     Chronic systolic HF (heart failure)     Coronary artery disease     DM (diabetes mellitus)     H/O osteomyelitis 2018    Hyperlipemia     Hypertension

## 2020-07-20 NOTE — H&P ADULT - ASSESSMENT
81 yo F, POOR HISTORIAN, ?medication compliance, walks with walker with PMHx of HTN, HLD, AF (on Eliquis 1x/d only per pt, last dose 7/20 AM), IDDM with neuropathy, CAD s/p cardiac cath 4/2018 @ [facility] (pLAD 20% ISR, oD2 60%, dOM3 small with 99% ISR but supplied by collaterals from [facility], mRCA 30% with plan for aggressive medical management) , chronic systolic CHF (EF 40-45% by ECHO 4/20 18), who presented to Cardiologist Dr. De Souza with c/o pain under L breast described and had subsquent positive NST. In light of pt's risk factors, CCS Anginal Class IV Sx, known h/o CAD, pt referred for cardiac cath with possible intervention to r/o progressive CAD.     Pt denies bleeding, hematuria, hematochezia, melena. Last dose of Eliquis was > a week ago.   Hx of CHF EF (40%-45% 2018).   Pt     Mallampati Class  ASA Class  Pt is a suitable candidate for moderate sedation.   Risks & benefits of procedure and alternative therapy have been explained to the patient including but not limited to: allergic reaction, bleeding w/possible need for blood transfusion, infection, renal and vascular compromise, limb damage, arrhythmia, stroke, vessel dissection/perforation, Myocardial infarction, emergent CABG. Informed consent obtained and in chart. 81 yo F, POOR HISTORIAN, ?medication compliance, walks with walker with PMHx of HTN, HLD, AF (on Eliquis 1x/d only per pt, last dose 7/20 AM), IDDM with neuropathy, CAD s/p cardiac cath 4/2018 @ [facility] (pLAD 20% ISR, oD2 60%, dOM3 small with 99% ISR but supplied by collaterals from [facility], mRCA 30% with plan for aggressive medical management) , chronic systolic CHF (EF 40-45% by ECHO 4/20 18), who presented to Cardiologist Dr. De Souza with c/o pain under L breast described and had subsquent positive NST. In light of pt's risk factors, CCS Anginal Class IV Sx, known h/o CAD, pt referred for cardiac cath with possible intervention to r/o progressive CAD.     Pt denies bleeding, hematuria, hematochezia, melena. Last dose of Eliquis was > a week ago. Aspirin 325mg and Plavix 600mg x 1 ordered pre-cath  Hx of CHF EF (40%-45% 2018). Pt lungs CTA, no JVD. 2-3+ pitting edema in LE b/l. NS @ 30cc/hr ordered pre-cath.   Pt hypoglycemic @ 60 but asymptomatic. ! amp dextrose ordered.   Mallampati Class IV    ASA Class III  Pt is a suitable candidate for moderate sedation.   Risks & benefits of procedure and alternative therapy have been explained to the patient including but not limited to: allergic reaction, bleeding w/possible need for blood transfusion, infection, renal and vascular compromise, limb damage, arrhythmia, stroke, vessel dissection/perforation, Myocardial infarction, emergent CABG. Informed consent obtained and in chart. 79 yo F, POOR HISTORIAN, ?medication compliance, walks with walker with PMHx of HTN, HLD, AF (on Eliquis 1x/d only per pt, last dose 7/20 AM), IDDM with neuropathy, CAD s/p cardiac cath 4/2018 @ [facility] (pLAD 20% ISR, oD2 60%, dOM3 small with 99% ISR but supplied by collaterals from [facility], mRCA 30% with plan for aggressive medical management) , chronic systolic CHF (EF 40-45% by ECHO 4/20 18), who presented to Cardiologist Dr. De Souza with c/o pain under L breast described and had subsquent positive NST. In light of pt's risk factors, CCS Anginal Class IV Sx, known h/o CAD, pt referred for cardiac cath with possible intervention to r/o progressive CAD.     Pt denies bleeding, hematuria, hematochezia, melena. Last dose of Eliquis was > a week ago. Aspirin 325mg and Plavix 600mg x 1 ordered pre-cath  Hx of CHF EF (40%-45% 2018). Pt lungs CTA, no JVD. 2-3+ pitting edema in LE b/l. NS @ 30cc/hr ordered pre-cath.   Pt hypoglycemic @ 60 but asymptomatic. 1 amp dextrose ordered.   K 5.4. Continue to monitor and consider Lokelma is increasing.   Mallampati Class IV    ASA Class III  Pt is a suitable candidate for moderate sedation.   Risks & benefits of procedure and alternative therapy have been explained to the patient including but not limited to: allergic reaction, bleeding w/possible need for blood transfusion, infection, renal and vascular compromise, limb damage, arrhythmia, stroke, vessel dissection/perforation, Myocardial infarction, emergent CABG. Informed consent obtained and in chart.

## 2020-07-29 ENCOUNTER — TRANSCRIPTION ENCOUNTER (OUTPATIENT)
Age: 80
End: 2020-07-29

## 2020-07-29 ENCOUNTER — INPATIENT (INPATIENT)
Facility: HOSPITAL | Age: 80
LOS: 0 days | Discharge: HOME | DRG: 247 | End: 2020-07-30
Attending: INTERNAL MEDICINE | Admitting: STUDENT IN AN ORGANIZED HEALTH CARE EDUCATION/TRAINING PROGRAM
Payer: MEDICARE

## 2020-07-29 DIAGNOSIS — Z98.890 OTHER SPECIFIED POSTPROCEDURAL STATES: Chronic | ICD-10-CM

## 2020-07-29 DIAGNOSIS — Z95.5 PRESENCE OF CORONARY ANGIOPLASTY IMPLANT AND GRAFT: Chronic | ICD-10-CM

## 2020-07-29 DIAGNOSIS — Z95.1 PRESENCE OF AORTOCORONARY BYPASS GRAFT: Chronic | ICD-10-CM

## 2020-07-29 DIAGNOSIS — Z90.710 ACQUIRED ABSENCE OF BOTH CERVIX AND UTERUS: Chronic | ICD-10-CM

## 2020-07-29 LAB
A1C WITH ESTIMATED AVERAGE GLUCOSE RESULT: 8.8 % — HIGH (ref 4–5.6)
ALBUMIN SERPL ELPH-MCNC: 4.1 G/DL — SIGNIFICANT CHANGE UP (ref 3.3–5)
ALP SERPL-CCNC: 96 U/L — SIGNIFICANT CHANGE UP (ref 40–120)
ALT FLD-CCNC: 28 U/L — SIGNIFICANT CHANGE UP (ref 10–45)
ANION GAP SERPL CALC-SCNC: 9 MMOL/L — SIGNIFICANT CHANGE UP (ref 5–17)
APTT BLD: 41.4 SEC — HIGH (ref 27.5–35.5)
AST SERPL-CCNC: 27 U/L — SIGNIFICANT CHANGE UP (ref 10–40)
BASOPHILS # BLD AUTO: 0.02 K/UL — SIGNIFICANT CHANGE UP (ref 0–0.2)
BASOPHILS NFR BLD AUTO: 0.4 % — SIGNIFICANT CHANGE UP (ref 0–2)
BILIRUB SERPL-MCNC: 0.5 MG/DL — SIGNIFICANT CHANGE UP (ref 0.2–1.2)
BUN SERPL-MCNC: 46 MG/DL — HIGH (ref 7–23)
CALCIUM SERPL-MCNC: 9.6 MG/DL — SIGNIFICANT CHANGE UP (ref 8.4–10.5)
CHLORIDE SERPL-SCNC: 104 MMOL/L — SIGNIFICANT CHANGE UP (ref 96–108)
CHOLEST SERPL-MCNC: 136 MG/DL — SIGNIFICANT CHANGE UP (ref 10–199)
CK MB CFR SERPL CALC: 1.6 NG/ML — SIGNIFICANT CHANGE UP (ref 0–6.7)
CK SERPL-CCNC: 158 U/L — SIGNIFICANT CHANGE UP (ref 25–170)
CO2 SERPL-SCNC: 26 MMOL/L — SIGNIFICANT CHANGE UP (ref 22–31)
CREAT SERPL-MCNC: 1.45 MG/DL — HIGH (ref 0.5–1.3)
CRP SERPL-MCNC: 0.6 MG/DL — HIGH (ref 0–0.4)
EOSINOPHIL # BLD AUTO: 0.14 K/UL — SIGNIFICANT CHANGE UP (ref 0–0.5)
EOSINOPHIL NFR BLD AUTO: 2.6 % — SIGNIFICANT CHANGE UP (ref 0–6)
ESTIMATED AVERAGE GLUCOSE: 206 MG/DL — HIGH (ref 68–114)
GLUCOSE BLDC GLUCOMTR-MCNC: 174 MG/DL — HIGH (ref 70–99)
GLUCOSE BLDC GLUCOMTR-MCNC: 191 MG/DL — HIGH (ref 70–99)
GLUCOSE SERPL-MCNC: 55 MG/DL — LOW (ref 70–99)
HCT VFR BLD CALC: 35.5 % — SIGNIFICANT CHANGE UP (ref 34.5–45)
HDLC SERPL-MCNC: 48 MG/DL — LOW
HGB BLD-MCNC: 11.1 G/DL — LOW (ref 11.5–15.5)
IMM GRANULOCYTES NFR BLD AUTO: 0.2 % — SIGNIFICANT CHANGE UP (ref 0–1.5)
INR BLD: 1.04 — SIGNIFICANT CHANGE UP (ref 0.88–1.16)
LIPID PNL WITH DIRECT LDL SERPL: 74 MG/DL — SIGNIFICANT CHANGE UP
LYMPHOCYTES # BLD AUTO: 1.94 K/UL — SIGNIFICANT CHANGE UP (ref 1–3.3)
LYMPHOCYTES # BLD AUTO: 36.7 % — SIGNIFICANT CHANGE UP (ref 13–44)
MCHC RBC-ENTMCNC: 26.6 PG — LOW (ref 27–34)
MCHC RBC-ENTMCNC: 31.3 GM/DL — LOW (ref 32–36)
MCV RBC AUTO: 84.9 FL — SIGNIFICANT CHANGE UP (ref 80–100)
MONOCYTES # BLD AUTO: 0.49 K/UL — SIGNIFICANT CHANGE UP (ref 0–0.9)
MONOCYTES NFR BLD AUTO: 9.3 % — SIGNIFICANT CHANGE UP (ref 2–14)
NEUTROPHILS # BLD AUTO: 2.69 K/UL — SIGNIFICANT CHANGE UP (ref 1.8–7.4)
NEUTROPHILS NFR BLD AUTO: 50.8 % — SIGNIFICANT CHANGE UP (ref 43–77)
NRBC # BLD: 0 /100 WBCS — SIGNIFICANT CHANGE UP (ref 0–0)
PLATELET # BLD AUTO: 212 K/UL — SIGNIFICANT CHANGE UP (ref 150–400)
POTASSIUM SERPL-MCNC: 5.4 MMOL/L — HIGH (ref 3.5–5.3)
POTASSIUM SERPL-SCNC: 5.4 MMOL/L — HIGH (ref 3.5–5.3)
PROT SERPL-MCNC: 7.6 G/DL — SIGNIFICANT CHANGE UP (ref 6–8.3)
PROTHROM AB SERPL-ACNC: 12.5 SEC — SIGNIFICANT CHANGE UP (ref 10.6–13.6)
RBC # BLD: 4.18 M/UL — SIGNIFICANT CHANGE UP (ref 3.8–5.2)
RBC # FLD: 14.9 % — HIGH (ref 10.3–14.5)
SARS-COV-2 RNA SPEC QL NAA+PROBE: SIGNIFICANT CHANGE UP
SODIUM SERPL-SCNC: 139 MMOL/L — SIGNIFICANT CHANGE UP (ref 135–145)
TOTAL CHOLESTEROL/HDL RATIO MEASUREMENT: 2.8 RATIO — LOW (ref 3.3–7.1)
TRIGL SERPL-MCNC: 70 MG/DL — SIGNIFICANT CHANGE UP (ref 10–149)
WBC # BLD: 5.29 K/UL — SIGNIFICANT CHANGE UP (ref 3.8–10.5)
WBC # FLD AUTO: 5.29 K/UL — SIGNIFICANT CHANGE UP (ref 3.8–10.5)

## 2020-07-29 PROCEDURE — 99222 1ST HOSP IP/OBS MODERATE 55: CPT

## 2020-07-29 PROCEDURE — 93010 ELECTROCARDIOGRAM REPORT: CPT

## 2020-07-29 RX ORDER — ASPIRIN/CALCIUM CARB/MAGNESIUM 324 MG
325 TABLET ORAL ONCE
Refills: 0 | Status: COMPLETED | OUTPATIENT
Start: 2020-07-29 | End: 2020-07-29

## 2020-07-29 RX ORDER — LOSARTAN POTASSIUM 100 MG/1
1 TABLET, FILM COATED ORAL
Qty: 0 | Refills: 0 | DISCHARGE

## 2020-07-29 RX ORDER — DEXTROSE 50 % IN WATER 50 %
50 SYRINGE (ML) INTRAVENOUS ONCE
Refills: 0 | Status: COMPLETED | OUTPATIENT
Start: 2020-07-29 | End: 2020-07-29

## 2020-07-29 RX ORDER — DEXTROSE 50 % IN WATER 50 %
25 SYRINGE (ML) INTRAVENOUS ONCE
Refills: 0 | Status: DISCONTINUED | OUTPATIENT
Start: 2020-07-29 | End: 2020-07-30

## 2020-07-29 RX ORDER — MOMETASONE FUROATE AND FORMOTEROL FUMARATE DIHYDRATE 200; 5 UG/1; UG/1
2 AEROSOL RESPIRATORY (INHALATION)
Qty: 0 | Refills: 0 | DISCHARGE

## 2020-07-29 RX ORDER — AMLODIPINE BESYLATE 2.5 MG/1
5 TABLET ORAL DAILY
Refills: 0 | Status: DISCONTINUED | OUTPATIENT
Start: 2020-07-29 | End: 2020-07-30

## 2020-07-29 RX ORDER — METOPROLOL TARTRATE 50 MG
25 TABLET ORAL AT BEDTIME
Refills: 0 | Status: DISCONTINUED | OUTPATIENT
Start: 2020-07-29 | End: 2020-07-30

## 2020-07-29 RX ORDER — SODIUM CHLORIDE 9 MG/ML
500 INJECTION INTRAMUSCULAR; INTRAVENOUS; SUBCUTANEOUS
Refills: 0 | Status: DISCONTINUED | OUTPATIENT
Start: 2020-07-29 | End: 2020-07-30

## 2020-07-29 RX ORDER — INSULIN DETEMIR 100/ML (3)
50 INSULIN PEN (ML) SUBCUTANEOUS
Qty: 0 | Refills: 0 | DISCHARGE

## 2020-07-29 RX ORDER — GABAPENTIN 400 MG/1
1 CAPSULE ORAL
Qty: 0 | Refills: 0 | DISCHARGE

## 2020-07-29 RX ORDER — APIXABAN 2.5 MG/1
5 TABLET, FILM COATED ORAL
Refills: 0 | Status: DISCONTINUED | OUTPATIENT
Start: 2020-07-30 | End: 2020-07-30

## 2020-07-29 RX ORDER — INSULIN DETEMIR 100/ML (3)
70 INSULIN PEN (ML) SUBCUTANEOUS
Qty: 0 | Refills: 0 | DISCHARGE

## 2020-07-29 RX ORDER — GABAPENTIN 400 MG/1
300 CAPSULE ORAL THREE TIMES A DAY
Refills: 0 | Status: DISCONTINUED | OUTPATIENT
Start: 2020-07-29 | End: 2020-07-30

## 2020-07-29 RX ORDER — DEXTROSE 50 % IN WATER 50 %
15 SYRINGE (ML) INTRAVENOUS ONCE
Refills: 0 | Status: DISCONTINUED | OUTPATIENT
Start: 2020-07-29 | End: 2020-07-30

## 2020-07-29 RX ORDER — CHLORHEXIDINE GLUCONATE 213 G/1000ML
1 SOLUTION TOPICAL ONCE
Refills: 0 | Status: DISCONTINUED | OUTPATIENT
Start: 2020-07-29 | End: 2020-07-29

## 2020-07-29 RX ORDER — EZETIMIBE 10 MG/1
1 TABLET ORAL
Qty: 0 | Refills: 0 | DISCHARGE

## 2020-07-29 RX ORDER — INSULIN LISPRO 100/ML
VIAL (ML) SUBCUTANEOUS
Refills: 0 | Status: DISCONTINUED | OUTPATIENT
Start: 2020-07-29 | End: 2020-07-30

## 2020-07-29 RX ORDER — CLOPIDOGREL BISULFATE 75 MG/1
600 TABLET, FILM COATED ORAL ONCE
Refills: 0 | Status: COMPLETED | OUTPATIENT
Start: 2020-07-29 | End: 2020-07-29

## 2020-07-29 RX ORDER — METOPROLOL TARTRATE 50 MG
1 TABLET ORAL
Qty: 0 | Refills: 0 | DISCHARGE

## 2020-07-29 RX ORDER — CLOPIDOGREL BISULFATE 75 MG/1
75 TABLET, FILM COATED ORAL DAILY
Refills: 0 | Status: DISCONTINUED | OUTPATIENT
Start: 2020-07-30 | End: 2020-07-30

## 2020-07-29 RX ORDER — CILOSTAZOL 100 MG/1
50 TABLET ORAL
Refills: 0 | Status: DISCONTINUED | OUTPATIENT
Start: 2020-07-29 | End: 2020-07-30

## 2020-07-29 RX ORDER — ASPIRIN/CALCIUM CARB/MAGNESIUM 324 MG
81 TABLET ORAL DAILY
Refills: 0 | Status: DISCONTINUED | OUTPATIENT
Start: 2020-07-30 | End: 2020-07-30

## 2020-07-29 RX ORDER — AMLODIPINE BESYLATE 2.5 MG/1
1 TABLET ORAL
Qty: 0 | Refills: 0 | DISCHARGE

## 2020-07-29 RX ORDER — DEXTROSE 50 % IN WATER 50 %
12.5 SYRINGE (ML) INTRAVENOUS ONCE
Refills: 0 | Status: DISCONTINUED | OUTPATIENT
Start: 2020-07-29 | End: 2020-07-30

## 2020-07-29 RX ORDER — SODIUM CHLORIDE 9 MG/ML
1000 INJECTION, SOLUTION INTRAVENOUS
Refills: 0 | Status: DISCONTINUED | OUTPATIENT
Start: 2020-07-29 | End: 2020-07-30

## 2020-07-29 RX ORDER — LISINOPRIL 2.5 MG/1
20 TABLET ORAL DAILY
Refills: 0 | Status: DISCONTINUED | OUTPATIENT
Start: 2020-07-30 | End: 2020-07-30

## 2020-07-29 RX ORDER — GLUCAGON INJECTION, SOLUTION 0.5 MG/.1ML
1 INJECTION, SOLUTION SUBCUTANEOUS ONCE
Refills: 0 | Status: DISCONTINUED | OUTPATIENT
Start: 2020-07-29 | End: 2020-07-30

## 2020-07-29 RX ORDER — MONTELUKAST 4 MG/1
10 TABLET, CHEWABLE ORAL DAILY
Refills: 0 | Status: DISCONTINUED | OUTPATIENT
Start: 2020-07-29 | End: 2020-07-30

## 2020-07-29 RX ORDER — SODIUM CHLORIDE 9 MG/ML
500 INJECTION INTRAMUSCULAR; INTRAVENOUS; SUBCUTANEOUS
Refills: 0 | Status: DISCONTINUED | OUTPATIENT
Start: 2020-07-29 | End: 2020-07-29

## 2020-07-29 RX ORDER — ACETAMINOPHEN 500 MG
650 TABLET ORAL
Qty: 0 | Refills: 0 | DISCHARGE

## 2020-07-29 RX ORDER — ATORVASTATIN CALCIUM 80 MG/1
80 TABLET, FILM COATED ORAL DAILY
Refills: 0 | Status: DISCONTINUED | OUTPATIENT
Start: 2020-07-29 | End: 2020-07-30

## 2020-07-29 RX ADMIN — Medication 2: at 23:39

## 2020-07-29 RX ADMIN — CILOSTAZOL 50 MILLIGRAM(S): 100 TABLET ORAL at 18:18

## 2020-07-29 RX ADMIN — Medication 325 MILLIGRAM(S): at 13:11

## 2020-07-29 RX ADMIN — ATORVASTATIN CALCIUM 80 MILLIGRAM(S): 80 TABLET, FILM COATED ORAL at 23:39

## 2020-07-29 RX ADMIN — GABAPENTIN 300 MILLIGRAM(S): 400 CAPSULE ORAL at 23:39

## 2020-07-29 RX ADMIN — Medication 25 MILLIGRAM(S): at 18:54

## 2020-07-29 RX ADMIN — CLOPIDOGREL BISULFATE 600 MILLIGRAM(S): 75 TABLET, FILM COATED ORAL at 13:12

## 2020-07-29 RX ADMIN — SODIUM CHLORIDE 30 MILLILITER(S): 9 INJECTION INTRAMUSCULAR; INTRAVENOUS; SUBCUTANEOUS at 13:12

## 2020-07-29 RX ADMIN — Medication 50 MILLILITER(S): at 12:45

## 2020-07-29 NOTE — DISCHARGE NOTE PROVIDER - CARE PROVIDER_API CALL
Samm Thayer  CARDIOVASCULAR DISEASE  [facility] [place] [address]  Phone: [phone]  Fax: [phone]  Follow Up Time: 1 week Samm Thayer  CARDIOVASCULAR DISEASE  [address] [place] [address]  Phone: [phone]  Fax: [phone]  Follow Up Time: 1 week    Nolvia Lunsford  [address] [place] [address]  Phone: [phone]  Fax: (   )    -  Established Patient  Follow Up Time: 2 weeks Nolvia Lunsford  [address] [facility] [address] [place] [address]  Phone: [phone]  Fax: (   )    -  Established Patient  Follow Up Time: 1 week

## 2020-07-29 NOTE — DISCHARGE NOTE PROVIDER - HOSPITAL COURSE
79 yo F, POOR HISTORIAN, ?medication compliance, walks with walker with PMHx of HTN, HLD, AF (on Eliquis 1x/d only per pt, last dose 7/20 AM), IDDM with neuropathy, CAD s/p cardiac cath 4/2018 @ [facility] (pLAD 20% ISR, oD2 60%, dOM3 small with 99% ISR but supplied by collaterals from OM2, mRCA 30% with plan for aggressive medical management) , chronic systolic CHF (EF 40-45% by ECHO 4/20 18), osteomyelitis of  R 1st  and 2nd toe with gangrene 4/2018 s/p toenail removal and s/p 6 week abx course who presented to Cardiologist Dr. De Souza with c/o pain under L breast described as "wiggling like a worm" radiating to L arm and of mild intensity occurring independent of exertion, lasting couple minutes before self-resolving, over past 3 weeks.  Denies SOB, dizziness, diaphoresis, palpitations, fatigue, orthopnea, PND, syncope, N/V, abdominal pain. NST 4/22/18: large, moderate to severe defects in apical/inferior/inferolateral walls that are partially reversible suggestive of infarction with moderate per-infarct ischemia, LVEF 4%. TTE 4/22/18: mild MR, mildly dilated LA, moderate segmental LV systolic dysfunction,  LVEF 40-45%, inferior and inferolateral walls severe hypokinetic, mild KS. In light of pt's risk factors, CCS Anginal Class IV Sx, known h/o CAD, pt referred for cardiac cath with possible intervention to r/o progressive CAD.     Pt s/p cardiac catheterization 7/29/20 receiving PTCA/NEYDA mLAD and unsuccessful attempt of OM3 ISR , access R radial. Pt seen and examined at bedside this AM without any complaints or events overnight, access site stable non TTP, without ecchymosis, bleeding or hematoma, pulse 2+. VSS, labs and telemetry reviewed and pt stable for discharge as discussed with  ___. Pt has received appropriate discharge instructions, including medication regimen, access site management and follow up with Dr. Thayer in 1-2 weeks. Discharge medication regimen has been reviewed with attending with plan for patient to take Aspirin 81 mg daily, Plavix 75 mg daily, and ______. 81 yo F, POOR HISTORIAN, ?medication compliance, walks with rollator, with PMHx of HTN, HLD, AFib (on Eliquis), IDDM with neuropathy, CAD s/p cardiac cath 4/2018 @ [facility] (pLAD 20% ISR, oD2 60%, dOM3 small with 99% ISR but supplied by collaterals from OM2, mRCA 30% with plan for aggressive medical management) , chronic systolic CHF (EF 40-45% by ECHO 4/2018), osteomyelitis of R 1st  and 2nd toe with gangrene 4/2018 s/p toenail removal and s/p 6 week abx course who presented to Cardiologist Dr. De Souza with c/o pain under L breast described as "wiggling like a worm" radiating to L arm and of mild intensity occurring independent of exertion, lasting couple minutes before self-resolving, over past 3 weeks.  NST 4/22/18: large, moderate to severe defects in apical/inferior/inferolateral walls that are partially reversible suggestive of infarction with moderate per-infarct ischemia, LVEF 40-45%. TTE 4/22/18: mild MR, mildly dilated LA, moderate segmental LV systolic dysfunction, LVEF 40-45%, inferior and inferolateral walls severe hypokinetic, mild [place]. In light of pt's risk factors, CCS Anginal Class IV Sx, known h/o CAD, pt referred for cardiac cath with possible intervention to r/o progressive CAD.     Pt s/p cardiac catheterization 7/29/20 receiving PTCA/NEYDA mLAD and unsuccessful attempt of OM3 ISR . R radial access developed small hematoma s/p procedure, resolved after manual compression. In the AM R radial site CDI, stable non TTP, without ecchymosis, bleeding or hematoma, pulse 2+.     Pt seen and examined at bedside in AM without any complaints. Tele noted Afib 100-120s overnight and self-converted to SR 70s. AM labs noting uptrending hyperkalemia to 5.6. EKG w/o changes. Pt received Kayexalate 15g PO, and outpatient ACEi was stopped. Repeat K 5.9. Pt then received Lokelma 10g PO, Dextrose 50% 1/2 amp, Insulin regular 10u IV, Albuterol Neb x 1. Repeat K ____    Pt has received appropriate discharge instructions, including medication regimen, access site management and follow up with Dr. Thayer and cardiologist Dr Nolvia Lusnford in 1-2 weeks. Discharge medication regimen has been reviewed with attending with plan for patient to take Aspirin 81 mg daily, Plavix 75 mg daily, and Eliquis 5mg BID until follow up with outpatient cardiologist in office. 81 yo F, POOR HISTORIAN, ?medication compliance, walks with rollator, with PMHx of HTN, HLD, AFib (on Eliquis), IDDM with neuropathy, CAD s/p cardiac cath 4/2018 @ [facility] (pLAD 20% ISR, oD2 60%, dOM3 small with 99% ISR but supplied by collaterals from OM2, mRCA 30% with plan for aggressive medical management) , chronic systolic CHF (EF 40-45% by ECHO 4/2018), osteomyelitis of R 1st  and 2nd toe with gangrene 4/2018 s/p toenail removal and s/p 6 week abx course who presented to Cardiologist Dr. De Souza with c/o pain under L breast described as "wiggling like a worm" radiating to L arm and of mild intensity occurring independent of exertion, lasting couple minutes before self-resolving, over past 3 weeks.  NST 4/22/18: large, moderate to severe defects in apical/inferior/inferolateral walls that are partially reversible suggestive of infarction with moderate per-infarct ischemia, LVEF 40-45%. TTE 4/22/18: mild MR, mildly dilated LA, moderate segmental LV systolic dysfunction, LVEF 40-45%, inferior and inferolateral walls severe hypokinetic, mild DC. In light of pt's risk factors, CCS Anginal Class IV Sx, known h/o CAD, pt referred for cardiac cath with possible intervention to r/o progressive CAD.     Pt s/p cardiac catheterization 7/29/20 receiving PTCA/NEYDA mLAD and unsuccessful attempt of OM3 ISR . R radial access developed small hematoma s/p procedure, resolved after manual compression. In the AM R radial site CDI, stable non TTP, without ecchymosis, bleeding or hematoma, pulse 2+.     Pt seen and examined at bedside in AM without any complaints. Tele noted Afib 100-120s overnight and self-converted to SR 70s. AM labs noting uptrending hyperkalemia to 5.6. EKG w/o changes. Pt received Kayexalate 15g PO, and outpatient ACEi was stopped. Repeat K 5.9. Pt then received Lokelma 10g PO, Dextrose 50% 1/2 amp, Insulin regular 10u IV, Albuterol Neb x 1. Repeat K 4.9.    Pt has received appropriate discharge instructions, including medication regimen, access site management and follow up with Dr. Thayer and cardiologist Dr Nolvia Lunsford in 1-2 weeks. Discharge medication regimen has been reviewed with attending with plan for patient to take Aspirin 81 mg daily, Plavix 75 mg daily, and Eliquis 5mg BID until follow up with outpatient cardiologist in office. 79 yo F, POOR HISTORIAN, ?medication compliance, walks with rollator, with PMHx of HTN, HLD, AFib (on Eliquis), IDDM with neuropathy, CAD s/p cardiac cath 4/2018 @ [facility] (pLAD 20% ISR, oD2 60%, dOM3 small with 99% ISR but supplied by collaterals from OM2, mRCA 30% with plan for aggressive medical management) , chronic systolic CHF (EF 40-45% by ECHO 4/2018), osteomyelitis of R 1st  and 2nd toe with gangrene 4/2018 s/p toenail removal and s/p 6 week abx course who presented to Cardiologist Dr. De Souza with c/o pain under L breast described as "wiggling like a worm" radiating to L arm and of mild intensity occurring independent of exertion, lasting couple minutes before self-resolving, over past 3 weeks.  NST 4/22/18: large, moderate to severe defects in apical/inferior/inferolateral walls that are partially reversible suggestive of infarction with moderate per-infarct ischemia, LVEF 40-45%. TTE 4/22/18: mild MR, mildly dilated LA, moderate segmental LV systolic dysfunction, LVEF 40-45%, inferior and inferolateral walls severe hypokinetic, mild [place]. In light of pt's risk factors, CCS Anginal Class IV Sx, known h/o CAD, pt referred for cardiac cath with possible intervention to r/o progressive CAD.     Pt s/p cardiac catheterization 7/29/20 receiving PTCA/NEYDA mLAD and unsuccessful attempt of OM3 ISR . R radial access developed small hematoma s/p procedure, resolved after manual compression. In the AM R radial site CDI, stable non TTP, without ecchymosis, bleeding or hematoma, pulse 2+.     Pt seen and examined at bedside in AM without any complaints. Tele noted Afib 100-120s overnight and self-converted to SR 70s. AM labs noting uptrending hyperkalemia to 5.6. EKG w/o changes. Pt received Kayexalate 15g PO, and outpatient ACEi was stopped. Repeat K 5.9. Pt then received Lokelma 10g PO, Dextrose 50% 1/2 amp, Insulin regular 10u IV, Albuterol Neb x 1. Repeat K 4.9.    Pt has received appropriate discharge instructions, including medication regimen, access site management and follow up with cardiologist Dr Nolvia Lunsford in 1-2 weeks. Discharge medication regimen has been reviewed with attending with plan for patient to take Aspirin 81 mg daily, Plavix 75 mg daily, and Eliquis 5mg BID until follow up with outpatient cardiologist in office. Outpatient Benazapril 20mg qd d/c'ed in light of hyperkalemia.

## 2020-07-29 NOTE — DISCHARGE NOTE PROVIDER - NSDCMRMEDTOKEN_GEN_ALL_CORE_FT
amlodipine-benazepril 5 mg-20 mg oral capsule: 1 cap(s) orally once a day  aspirin 81 mg oral tablet: 1 tab(s) orally once a day  cilostazol 50 mg oral tablet: 1 tab(s) orally 2 times a day   Eliquis 5 mg oral tablet: 1 tab(s) orally 2 times a day  gabapentin 300 mg oral capsule: 1 cap(s) orally 3 times a day   metoprolol succinate 25 mg oral tablet, extended release: 1 tab(s) orally once a day  montelukast 10 mg oral tablet: 1 tab(s) orally once a day  Rolling Walker: 1 unit  rosuvastatin 20 mg oral tablet: 1 tab(s) orally once a day  Zetia 10 mg oral tablet: 1 tab(s) orally once a day amlodipine-benazepril 5 mg-20 mg oral capsule: 1 cap(s) orally once a day  aspirin 81 mg oral tablet: 1 tab(s) orally once a day  clopidogrel 75 mg oral tablet: 1 tab(s) orally once a day  Eliquis 5 mg oral tablet: 1 tab(s) orally 2 times a day  gabapentin 300 mg oral capsule: 1 cap(s) orally 3 times a day   metoprolol succinate 25 mg oral tablet, extended release: 1 tab(s) orally once a day  montelukast 10 mg oral tablet: 1 tab(s) orally once a day  Rolling Walker: 1 unit  rosuvastatin 20 mg oral tablet: 1 tab(s) orally once a day  Zetia 10 mg oral tablet: 1 tab(s) orally once a day amLODIPine 5 mg oral tablet: 1 tab(s) orally once a day  aspirin 81 mg oral tablet: 1 tab(s) orally once a day  clopidogrel 75 mg oral tablet: 1 tab(s) orally once a day  Eliquis 5 mg oral tablet: 1 tab(s) orally 2 times a day  gabapentin 300 mg oral capsule: 1 cap(s) orally 3 times a day   metoprolol succinate 25 mg oral tablet, extended release: 1 tab(s) orally once a day  montelukast 10 mg oral tablet: 1 tab(s) orally once a day  Rolling Walker: 1 unit  rosuvastatin 20 mg oral tablet: 1 tab(s) orally once a day  Zetia 10 mg oral tablet: 1 tab(s) orally once a day amLODIPine 5 mg oral tablet: 1 tab(s) orally once a day  aspirin 81 mg oral tablet: 1 tab(s) orally once a day  cilostazol 50 mg oral tablet: 1 tab(s) orally 2 times a day  clopidogrel 75 mg oral tablet: 1 tab(s) orally once a day  Eliquis 5 mg oral tablet: 1 tab(s) orally 2 times a day  gabapentin 300 mg oral capsule: 1 cap(s) orally 3 times a day   metoprolol succinate 25 mg oral tablet, extended release: 1 tab(s) orally once a day  montelukast 10 mg oral tablet: 1 tab(s) orally once a day  Rolling Walker: 1 unit  rosuvastatin 20 mg oral tablet: 1 tab(s) orally once a day  Zetia 10 mg oral tablet: 1 tab(s) orally once a day

## 2020-07-29 NOTE — DISCHARGE NOTE PROVIDER - NSDCCPCAREPLAN_GEN_ALL_CORE_FT
PRINCIPAL DISCHARGE DIAGNOSIS  Diagnosis: CAD (coronary artery disease)  Assessment and Plan of Treatment: You were found to have blockages in the arteries of your heart, also known as Coronary Artery Diseease. You underwent a cardiac angiogram on 7/29/20 and received a stent to the left anterior descending artery. PLEASE CONTINUE ASPIRIN 81MG DAILY AND PLAVIX 75MG DAILY. DO NOT STOP THESE MEDICATIONS FOR ANY REASON AS THEY ARE KEEPING YOUR STENT OPEN AND PREVENTING A HEART ATTACK.   Avoid strenuous activity or heavy lifting anything more than 5lbs for the next five days. Do not take a bath or swim for the next five days; you may shower. For any bleeding or hematoma formation (hardened blood collection under the skin) at the access site of your right wrist please hold pressure and go to the emergency room. Please follow up with Dr. Thayer in 1-2 weeks. For recurrent chest pain, please call your doctor or go to the emergency room.      SECONDARY DISCHARGE DIAGNOSES  Diagnosis: HTN (hypertension)  Assessment and Plan of Treatment: Please continue your medications as listed to keep your blood pressure controlled. For blood pressure that is too high or too low please see your doctor or go to the emergency room as necessary.    Diagnosis: HLD (hyperlipidemia)  Assessment and Plan of Treatment: Please continue ____ at bedtime to keep your cholesterol low. High cholesterol contributes to heart disease.    Diagnosis: Atrial fibrillation  Assessment and Plan of Treatment: You have been diagnosed with an abnormal heart rhythm (arrhythmia) called atrial fibrillation. With this condition, your heart’s 2 upper chambers (the atria) quiver rather than squeeze the blood out in a normal pattern. This leads to an irregular and sometimes rapid heartbeat. Atrial fibrillation is serious condition as it affects the heart’s ability to fill with blood as it should and blood clots may form, which increases the risk for stroke. PLEASE CONTINUE TO TAKE YOUR ELIQUIS 5MG TWICE A DAY TO PREVENT A STROKE. PLEASE CONTINUE YOUR _____  TO KEEP YOUR HEART RATE NORMAL. PRINCIPAL DISCHARGE DIAGNOSIS  Diagnosis: CAD (coronary artery disease)  Assessment and Plan of Treatment: You were found to have blockages in the arteries of your heart, also known as Coronary Artery Diseease. You underwent a cardiac angiogram on 7/29/20 and received a cardiac stent to the left anterior descending artery. PLEASE CONTINUE BLOOD THINNER MEDICATIONS ASPIRIN 81MG DAILY AND PLAVIX 75MG DAILY. DO NOT STOP THESE MEDICATIONS FOR ANY REASON AS THEY ARE KEEPING YOUR STENT OPEN AND PREVENTING A HEART ATTACK.   Avoid strenuous activity or heavy lifting anything more than 5lbs for the next five days. Do not take a bath or swim for the next five days; you may shower. For any bleeding or hematoma formation (hardened blood collection under the skin) at the access site of your right wrist please hold pressure and go to the emergency room. Please follow up with Dr. Lunsford in 1-2 weeks. For recurrent chest pain, please call your doctor or go to the emergency room. If you have any questions or concerns regarding your right wrist access site, call [phone] anytime 24 hours a day.      SECONDARY DISCHARGE DIAGNOSES  Diagnosis: Hyperkalemia  Assessment and Plan of Treatment: You were found to have high potassium levels while in the hospital. One of your blood pressure medication (Benazapril) was STOPPED due to high potassium level. You were given medications to help bring the potassium level back to normal. Please follow up with your PMD or cardiologist in 1-2 weeks for further monitoring.    Diagnosis: HTN (hypertension)  Assessment and Plan of Treatment: Your blood pressure medications that is a combination pill (Amlodipine/Benazapril) was STOPPED due to high potassium level. A new prescribed was sent to your pharmacy for Amlodipine 5mg once a day (without the Benazapril). Continue taking your other medications.    Diagnosis: HLD (hyperlipidemia)  Assessment and Plan of Treatment: Please continue Crestor at bedtime to keep your cholesterol low. High cholesterol contributes to heart disease.    Diagnosis: Atrial fibrillation  Assessment and Plan of Treatment: You have been diagnosed with an abnormal heart rhythm (arrhythmia) called atrial fibrillation. With this condition, your heart’s 2 upper chambers (the atria) quiver rather than squeeze the blood out in a normal pattern. This leads to an irregular and sometimes rapid heartbeat. Atrial fibrillation is serious condition as it affects the heart’s ability to fill with blood as it should and blood clots may form, which increases the risk for stroke. PLEASE CONTINUE TO TAKE YOUR ELIQUIS 5MG TWICE A DAY TO PREVENT A STROKE. PLEASE CONTINUE YOUR METOPROLOL 25MG ONCE A DAY  TO KEEP YOUR HEART RATE NORMAL.

## 2020-07-29 NOTE — PROGRESS NOTE ADULT - SUBJECTIVE AND OBJECTIVE BOX
Interventional Cardiology Radial band Removal Note    Pt without complaints.  VSS.    Right Radial access site TR  Hemoband in place, no hematoma, no bleed  Radial pulse: 2+    Hemostasis achieved with manual release of hemoband and 10 minutes manual compression.     No Vasovagal reaction.    Meds given: None    Right Radial access site  no hematoma, no bleed  Radial pulse: 2+    A/P:  s/p Stent   -	continue to monitor  -	-OOB as tolerated  -	Post Procedure Instructions given  -                   Call [phone] if any access site issues.

## 2020-07-29 NOTE — DISCHARGE NOTE PROVIDER - PROVIDER TOKENS
PROVIDER:[TOKEN:[[id number]],FOLLOWUP:[1 week]] PROVIDER:[TOKEN:[[id number]],FOLLOWUP:[1 week]],FREE:[LAST:[Akinboboye],FIRST:[[place]],PHONE:[phone]:[(   )    -],ADDRESS:[[phone] [place] [address]],FOLLOWUP:[2 weeks],ESTABLISHEDPATIENT:[T]] FREE:[LAST:[Akinboboye],FIRST:[Nolvia],PHONE:[phone]],FAX:[(   )    -],ADDRESS:[[address] [place] [address]],FOLLOWUP:[1 week],ESTABLISHEDPATIENT:[T]]

## 2020-07-30 ENCOUNTER — TRANSCRIPTION ENCOUNTER (OUTPATIENT)
Age: 80
End: 2020-07-30

## 2020-07-30 VITALS — SYSTOLIC BLOOD PRESSURE: 137 MMHG | RESPIRATION RATE: 20 BRPM | DIASTOLIC BLOOD PRESSURE: 60 MMHG | HEART RATE: 98 BPM

## 2020-07-30 LAB
ANION GAP SERPL CALC-SCNC: 10 MMOL/L — SIGNIFICANT CHANGE UP (ref 5–17)
ANION GAP SERPL CALC-SCNC: 8 MMOL/L — SIGNIFICANT CHANGE UP (ref 5–17)
ANION GAP SERPL CALC-SCNC: 9 MMOL/L — SIGNIFICANT CHANGE UP (ref 5–17)
BUN SERPL-MCNC: 34 MG/DL — HIGH (ref 7–23)
BUN SERPL-MCNC: 36 MG/DL — HIGH (ref 7–23)
BUN SERPL-MCNC: 39 MG/DL — HIGH (ref 7–23)
CALCIUM SERPL-MCNC: 9 MG/DL — SIGNIFICANT CHANGE UP (ref 8.4–10.5)
CALCIUM SERPL-MCNC: 9.2 MG/DL — SIGNIFICANT CHANGE UP (ref 8.4–10.5)
CALCIUM SERPL-MCNC: 9.7 MG/DL — SIGNIFICANT CHANGE UP (ref 8.4–10.5)
CHLORIDE SERPL-SCNC: 104 MMOL/L — SIGNIFICANT CHANGE UP (ref 96–108)
CHLORIDE SERPL-SCNC: 106 MMOL/L — SIGNIFICANT CHANGE UP (ref 96–108)
CHLORIDE SERPL-SCNC: 106 MMOL/L — SIGNIFICANT CHANGE UP (ref 96–108)
CO2 SERPL-SCNC: 23 MMOL/L — SIGNIFICANT CHANGE UP (ref 22–31)
CO2 SERPL-SCNC: 23 MMOL/L — SIGNIFICANT CHANGE UP (ref 22–31)
CO2 SERPL-SCNC: 26 MMOL/L — SIGNIFICANT CHANGE UP (ref 22–31)
CREAT SERPL-MCNC: 1.29 MG/DL — SIGNIFICANT CHANGE UP (ref 0.5–1.3)
CREAT SERPL-MCNC: 1.37 MG/DL — HIGH (ref 0.5–1.3)
CREAT SERPL-MCNC: 1.54 MG/DL — HIGH (ref 0.5–1.3)
GLUCOSE BLDC GLUCOMTR-MCNC: 196 MG/DL — HIGH (ref 70–99)
GLUCOSE BLDC GLUCOMTR-MCNC: 242 MG/DL — HIGH (ref 70–99)
GLUCOSE BLDC GLUCOMTR-MCNC: 95 MG/DL — SIGNIFICANT CHANGE UP (ref 70–99)
GLUCOSE SERPL-MCNC: 111 MG/DL — HIGH (ref 70–99)
GLUCOSE SERPL-MCNC: 213 MG/DL — HIGH (ref 70–99)
GLUCOSE SERPL-MCNC: 231 MG/DL — HIGH (ref 70–99)
HCT VFR BLD CALC: 39.1 % — SIGNIFICANT CHANGE UP (ref 34.5–45)
HGB BLD-MCNC: 12.2 G/DL — SIGNIFICANT CHANGE UP (ref 11.5–15.5)
MAGNESIUM SERPL-MCNC: 2.4 MG/DL — SIGNIFICANT CHANGE UP (ref 1.6–2.6)
MCHC RBC-ENTMCNC: 26.1 PG — LOW (ref 27–34)
MCHC RBC-ENTMCNC: 31.2 GM/DL — LOW (ref 32–36)
MCV RBC AUTO: 83.5 FL — SIGNIFICANT CHANGE UP (ref 80–100)
NRBC # BLD: 0 /100 WBCS — SIGNIFICANT CHANGE UP (ref 0–0)
PLATELET # BLD AUTO: 248 K/UL — SIGNIFICANT CHANGE UP (ref 150–400)
POTASSIUM SERPL-MCNC: 4.9 MMOL/L — SIGNIFICANT CHANGE UP (ref 3.5–5.3)
POTASSIUM SERPL-MCNC: 5.6 MMOL/L — HIGH (ref 3.5–5.3)
POTASSIUM SERPL-MCNC: 5.9 MMOL/L — HIGH (ref 3.5–5.3)
POTASSIUM SERPL-SCNC: 4.9 MMOL/L — SIGNIFICANT CHANGE UP (ref 3.5–5.3)
POTASSIUM SERPL-SCNC: 5.6 MMOL/L — HIGH (ref 3.5–5.3)
POTASSIUM SERPL-SCNC: 5.9 MMOL/L — HIGH (ref 3.5–5.3)
RBC # BLD: 4.68 M/UL — SIGNIFICANT CHANGE UP (ref 3.8–5.2)
RBC # FLD: 14.6 % — HIGH (ref 10.3–14.5)
SODIUM SERPL-SCNC: 137 MMOL/L — SIGNIFICANT CHANGE UP (ref 135–145)
SODIUM SERPL-SCNC: 138 MMOL/L — SIGNIFICANT CHANGE UP (ref 135–145)
SODIUM SERPL-SCNC: 140 MMOL/L — SIGNIFICANT CHANGE UP (ref 135–145)
WBC # BLD: 5.64 K/UL — SIGNIFICANT CHANGE UP (ref 3.8–10.5)
WBC # FLD AUTO: 5.64 K/UL — SIGNIFICANT CHANGE UP (ref 3.8–10.5)

## 2020-07-30 PROCEDURE — 93010 ELECTROCARDIOGRAM REPORT: CPT

## 2020-07-30 PROCEDURE — 99238 HOSP IP/OBS DSCHRG MGMT 30/<: CPT

## 2020-07-30 RX ORDER — INSULIN HUMAN 100 [IU]/ML
10 INJECTION, SOLUTION SUBCUTANEOUS ONCE
Refills: 0 | Status: COMPLETED | OUTPATIENT
Start: 2020-07-30 | End: 2020-07-30

## 2020-07-30 RX ORDER — LACTULOSE 10 G/15ML
10 SOLUTION ORAL ONCE
Refills: 0 | Status: COMPLETED | OUTPATIENT
Start: 2020-07-30 | End: 2020-07-30

## 2020-07-30 RX ORDER — AMLODIPINE BESYLATE AND BENAZEPRIL HYDROCHLORIDE 10; 20 MG/1; MG/1
1 CAPSULE ORAL
Qty: 0 | Refills: 0 | DISCHARGE

## 2020-07-30 RX ORDER — SODIUM ZIRCONIUM CYCLOSILICATE 10 G/10G
10 POWDER, FOR SUSPENSION ORAL ONCE
Refills: 0 | Status: COMPLETED | OUTPATIENT
Start: 2020-07-30 | End: 2020-07-30

## 2020-07-30 RX ORDER — AMLODIPINE BESYLATE 2.5 MG/1
1 TABLET ORAL
Qty: 30 | Refills: 0
Start: 2020-07-30 | End: 2020-08-28

## 2020-07-30 RX ORDER — ALBUTEROL 90 UG/1
2.5 AEROSOL, METERED ORAL ONCE
Refills: 0 | Status: DISCONTINUED | OUTPATIENT
Start: 2020-07-30 | End: 2020-07-30

## 2020-07-30 RX ORDER — DEXTROSE 50 % IN WATER 50 %
25 SYRINGE (ML) INTRAVENOUS ONCE
Refills: 0 | Status: COMPLETED | OUTPATIENT
Start: 2020-07-30 | End: 2020-07-30

## 2020-07-30 RX ORDER — SODIUM POLYSTYRENE SULFONATE 4.1 MEQ/G
15 POWDER, FOR SUSPENSION ORAL ONCE
Refills: 0 | Status: COMPLETED | OUTPATIENT
Start: 2020-07-30 | End: 2020-07-30

## 2020-07-30 RX ORDER — SODIUM POLYSTYRENE SULFONATE 4.1 MEQ/G
15 POWDER, FOR SUSPENSION ORAL ONCE
Refills: 0 | Status: DISCONTINUED | OUTPATIENT
Start: 2020-07-30 | End: 2020-07-30

## 2020-07-30 RX ORDER — CLOPIDOGREL BISULFATE 75 MG/1
1 TABLET, FILM COATED ORAL
Qty: 30 | Refills: 11
Start: 2020-07-30 | End: 2021-07-24

## 2020-07-30 RX ADMIN — AMLODIPINE BESYLATE 5 MILLIGRAM(S): 2.5 TABLET ORAL at 06:06

## 2020-07-30 RX ADMIN — Medication 2: at 17:05

## 2020-07-30 RX ADMIN — Medication 25 MILLIGRAM(S): at 14:23

## 2020-07-30 RX ADMIN — Medication 81 MILLIGRAM(S): at 11:49

## 2020-07-30 RX ADMIN — INSULIN HUMAN 10 UNIT(S): 100 INJECTION, SOLUTION SUBCUTANEOUS at 14:08

## 2020-07-30 RX ADMIN — GABAPENTIN 300 MILLIGRAM(S): 400 CAPSULE ORAL at 06:07

## 2020-07-30 RX ADMIN — LACTULOSE 10 GRAM(S): 10 SOLUTION ORAL at 13:41

## 2020-07-30 RX ADMIN — SODIUM POLYSTYRENE SULFONATE 15 GRAM(S): 4.1 POWDER, FOR SUSPENSION ORAL at 09:21

## 2020-07-30 RX ADMIN — SODIUM ZIRCONIUM CYCLOSILICATE 10 GRAM(S): 10 POWDER, FOR SUSPENSION ORAL at 13:41

## 2020-07-30 RX ADMIN — GABAPENTIN 300 MILLIGRAM(S): 400 CAPSULE ORAL at 14:08

## 2020-07-30 RX ADMIN — Medication 4: at 11:49

## 2020-07-30 RX ADMIN — CILOSTAZOL 50 MILLIGRAM(S): 100 TABLET ORAL at 06:06

## 2020-07-30 RX ADMIN — MONTELUKAST 10 MILLIGRAM(S): 4 TABLET, CHEWABLE ORAL at 17:05

## 2020-07-30 RX ADMIN — Medication 25 MILLILITER(S): at 13:48

## 2020-07-30 RX ADMIN — CLOPIDOGREL BISULFATE 75 MILLIGRAM(S): 75 TABLET, FILM COATED ORAL at 11:49

## 2020-07-30 RX ADMIN — APIXABAN 5 MILLIGRAM(S): 2.5 TABLET, FILM COATED ORAL at 11:49

## 2020-07-30 NOTE — CHART NOTE - NSCHARTNOTEFT_GEN_A_CORE
Admitting Diagnosis:   Patient is a 80y old  Female who presents with a chief complaint of cardiac cath (29 Jul 2020 17:49)      Consult: Yes [   ]  No [  x ]    Reason for Initial Nutrition Assessment: LOS       PAST MEDICAL & SURGICAL HISTORY:  AF (atrial fibrillation)  Chronic systolic HF (heart failure)  H/O osteomyelitis: 2018  DM (diabetes mellitus)  Hyperlipemia  Hypertension  Coronary artery disease  MI (myocardial infarction)  CAD (coronary artery disease)  Hypertension  Diabetes  Hyperlipemia  Diabetes type 2, uncontrolled  CAD (coronary artery disease)  S/P hysterectomy  Stented coronary artery  H/O: hysterectomy  S/P coronary artery stent placement  S/P CABG x 3      Current Nutrition Order:  DASH TLC Consistent Carbohydrate     PO Intake: Good (%) [   ]  Fair (50-75%) [   ] Poor (<25%) [   ]  Please See Below     GI Issues:   BM 7/27   Soft NT     Pain:  none per flow sheets     Skin Integrity:  2+ BL ankle edema  no pressure ulcers     Labs:   07-30    138  |  106  |  36<H>  ----------------------------<  231<H>  5.9<H>   |  23  |  1.37<H>    Ca    9.2      30 Jul 2020 12:25  Mg     2.4     07-30    TPro  7.6  /  Alb  4.1  /  TBili  0.5  /  DBili  <0.2  /  AST  27  /  ALT  28  /  AlkPhos  96  07-29    CAPILLARY BLOOD GLUCOSE      POCT Blood Glucose.: 242 mg/dL (30 Jul 2020 11:37)  POCT Blood Glucose.: 95 mg/dL (30 Jul 2020 06:58)  POCT Blood Glucose.: 174 mg/dL (29 Jul 2020 23:36)  POCT Blood Glucose.: 191 mg/dL (29 Jul 2020 21:48)    Nutritionally Pertinent Lab Values:  K 5.9  BUN/Cr 36/1.37  Glucose 231  POCT 242  A1c 8.8%     Medications:  MEDICATIONS  (STANDING):  amLODIPine   Tablet 5 milliGRAM(s) Oral daily  apixaban 5 milliGRAM(s) Oral two times a day  aspirin enteric coated 81 milliGRAM(s) Oral daily  atorvastatin 80 milliGRAM(s) Oral daily  clopidogrel Tablet 75 milliGRAM(s) Oral daily  dextrose 5%. 1000 milliLiter(s) (50 mL/Hr) IV Continuous <Continuous>  dextrose 50% Injectable 12.5 Gram(s) IV Push once  dextrose 50% Injectable 25 Gram(s) IV Push once  dextrose 50% Injectable 25 Gram(s) IV Push once  gabapentin 300 milliGRAM(s) Oral three times a day  insulin lispro (HumaLOG) corrective regimen sliding scale   SubCutaneous Before meals and at bedtime  metoprolol succinate ER 25 milliGRAM(s) Oral at bedtime  montelukast 10 milliGRAM(s) Oral daily    MEDICATIONS  (PRN):  dextrose 40% Gel 15 Gram(s) Oral once PRN Blood Glucose LESS THAN 70 milliGRAM(s)/deciliter  glucagon  Injectable 1 milliGRAM(s) IntraMuscular once PRN Glucose LESS THAN 70 milligrams/deciliter      Admitted Anthropometrics:  5'2''  pounds +-10%  7/29 183 pounds  BMI 33.6  %QMK=631    Weight Change: NA     Nutrition Focused Physical Exam: Completed [   ]  Unable to complete [   ]- NA     Estimated energy needs:   IBW used to calculate energy needs due to pt's current body weight exceeding 120% of IBW  Adjusted for age based on current conditions, fluids per team   ~1700kcal/day 25kcal/kg  ~70-80gm/day 1.0-1.2gm/kg     Subjective:   79 yo F, POOR HISTORIAN, ?medication compliance, walks with walker with PMHx of HTN, HLD, AF (on Eliquis 1x/d only per pt, last dose 7/20 AM), IDDM with neuropathy, CAD s/p cardiac cath 4/2018 @ [facility] (pLAD 20% ISR, oD2 60%, dOM3 small with 99% ISR but supplied by collaterals from OM2, mRCA 30% with plan for aggressive medical management) , chronic systolic CHF (EF 40-45% by ECHO 4/20 18), osteomyelitis of  R 1st  and 2nd toe with gangrene 4/2018 s/p toenail removal and s/p 6 week abx course who presented to Cardiologist Dr. De Souza with c/o pain under L breast. In light of pt's risk factors, CCS Anginal Class IV Sx, known h/o CAD, pt referred for cardiac cath with possible intervention to r/o progressive CAD.   Spoke with pt/son at bedside. Reports decreased PO intake d/t aging, reports she takes oral nutrition supplements - per pt taking ensure, however son reports use of Glucerna. Usually takes x1/day. Despite decreased PO intake PTA, pt denies wt changes, however of note pt is unsure of UBW. Pt likes to eat crackers and yogurt. Pt on DASH TLC Consistent Carbohydrate diet at this time- reports overall good PO, No issues chewing/swallowing, NKFA.  Please see below for nutritions recommendations.     Nutrition Diagnosis: Inadequate oral intake RT decreased desire for meals / appetite d/t aging AEB decreased PO intake PTA   Goal:  Pt will meet at least 75% of nutrient needs     Recommendations:  1. DASH TLC Consistent Carbohydrate diet.  >> Monitor need for adding no conc K Pending Labs; noted elevated K at this time.  2. Monitor for %PO intake.  >> If intake at/below 50% of meals, suggest Glucerna daily (220kcal/20gm prot per 1 can).  3. Monitor Skin, Wts daily, GI, GOC.  4. Labs: monitor BMP, CBC, glucose, lytes, trend renal indices, LFTs, POCT.  5. RD to remain available for additional nutrition interventions as needed.     Education: Reviewed foods high/low in K. Discussed oral nutrition supplements. Encouraged PO intake during meals & reviewed importance. Discussed benefit of consuming protein with CHO + need for adequate protein in general. Understanding stated, provide additional motivation as needed.     Risk Level: High [   ] Moderate [ X  ] Low [   ]
Yes

## 2020-07-30 NOTE — PROVIDER CONTACT NOTE (CRITICAL VALUE NOTIFICATION) - ASSESSMENT
No acute distress noted pt denies any chest pain, palpitations, SOB. VSS - /85, HR 98 Afib, RR 17, 99% O2 on room air

## 2020-07-30 NOTE — DISCHARGE NOTE NURSING/CASE MANAGEMENT/SOCIAL WORK - PATIENT PORTAL LINK FT
You can access the FollowMyHealth Patient Portal offered by [facility] by registering at the following website: [email or web address]. By joining [facility]’s FollowMyHealth portal, you will also be able to view your health information using other applications (apps) compatible with our system.

## 2020-07-31 PROCEDURE — 85025 COMPLETE CBC W/AUTO DIFF WBC: CPT

## 2020-07-31 PROCEDURE — 85730 THROMBOPLASTIN TIME PARTIAL: CPT

## 2020-07-31 PROCEDURE — 83036 HEMOGLOBIN GLYCOSYLATED A1C: CPT

## 2020-07-31 PROCEDURE — 85610 PROTHROMBIN TIME: CPT

## 2020-07-31 PROCEDURE — 86140 C-REACTIVE PROTEIN: CPT

## 2020-07-31 PROCEDURE — 83735 ASSAY OF MAGNESIUM: CPT

## 2020-07-31 PROCEDURE — 80048 BASIC METABOLIC PNL TOTAL CA: CPT

## 2020-07-31 PROCEDURE — C1874: CPT

## 2020-07-31 PROCEDURE — 82550 ASSAY OF CK (CPK): CPT

## 2020-07-31 PROCEDURE — 82962 GLUCOSE BLOOD TEST: CPT

## 2020-07-31 PROCEDURE — 82248 BILIRUBIN DIRECT: CPT

## 2020-07-31 PROCEDURE — 36415 COLL VENOUS BLD VENIPUNCTURE: CPT

## 2020-07-31 PROCEDURE — C1894: CPT

## 2020-07-31 PROCEDURE — C1887: CPT

## 2020-07-31 PROCEDURE — 93005 ELECTROCARDIOGRAM TRACING: CPT

## 2020-07-31 PROCEDURE — C1769: CPT

## 2020-07-31 PROCEDURE — 80061 LIPID PANEL: CPT

## 2020-07-31 PROCEDURE — C1725: CPT

## 2020-07-31 PROCEDURE — 85027 COMPLETE CBC AUTOMATED: CPT

## 2020-07-31 PROCEDURE — 82553 CREATINE MB FRACTION: CPT

## 2020-07-31 PROCEDURE — 87635 SARS-COV-2 COVID-19 AMP PRB: CPT

## 2020-07-31 PROCEDURE — 80053 COMPREHEN METABOLIC PANEL: CPT

## 2020-08-04 DIAGNOSIS — Z79.4 LONG TERM (CURRENT) USE OF INSULIN: ICD-10-CM

## 2020-08-04 DIAGNOSIS — I25.2 OLD MYOCARDIAL INFARCTION: ICD-10-CM

## 2020-08-04 DIAGNOSIS — I48.91 UNSPECIFIED ATRIAL FIBRILLATION: ICD-10-CM

## 2020-08-04 DIAGNOSIS — Z79.01 LONG TERM (CURRENT) USE OF ANTICOAGULANTS: ICD-10-CM

## 2020-08-04 DIAGNOSIS — I11.0 HYPERTENSIVE HEART DISEASE WITH HEART FAILURE: ICD-10-CM

## 2020-08-04 DIAGNOSIS — Z90.710 ACQUIRED ABSENCE OF BOTH CERVIX AND UTERUS: ICD-10-CM
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
## 2020-08-04 DIAGNOSIS — L76.32 POSTPROCEDURAL HEMATOMA OF SKIN AND SUBCUTANEOUS TISSUE FOLLOWING OTHER PROCEDURE: ICD-10-CM

## 2020-08-04 DIAGNOSIS — E11.649 TYPE 2 DIABETES MELLITUS WITH HYPOGLYCEMIA WITHOUT COMA: ICD-10-CM

## 2020-08-04 DIAGNOSIS — E78.5 HYPERLIPIDEMIA, UNSPECIFIED: ICD-10-CM

## 2020-08-04 DIAGNOSIS — T82.855A STENOSIS OF CORONARY ARTERY STENT, INITIAL ENCOUNTER: ICD-10-CM
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
## 2020-08-04 DIAGNOSIS — I25.82 CHRONIC TOTAL OCCLUSION OF CORONARY ARTERY: ICD-10-CM

## 2020-08-04 DIAGNOSIS — I25.110 ATHEROSCLEROTIC HEART DISEASE OF NATIVE CORONARY ARTERY WITH UNSTABLE ANGINA PECTORIS: ICD-10-CM

## 2020-08-04 DIAGNOSIS — E87.5 HYPERKALEMIA: ICD-10-CM

## 2020-08-04 DIAGNOSIS — E11.40 TYPE 2 DIABETES MELLITUS WITH DIABETIC NEUROPATHY, UNSPECIFIED: ICD-10-CM
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
## 2020-08-04 DIAGNOSIS — I50.22 CHRONIC SYSTOLIC (CONGESTIVE) HEART FAILURE: ICD-10-CM

## 2020-09-26 ENCOUNTER — EMERGENCY (EMERGENCY)
Facility: HOSPITAL | Age: 80
LOS: 1 days | Discharge: HOME | End: 2020-09-26
Attending: STUDENT IN AN ORGANIZED HEALTH CARE EDUCATION/TRAINING PROGRAM | Admitting: STUDENT IN AN ORGANIZED HEALTH CARE EDUCATION/TRAINING PROGRAM
Payer: COMMERCIAL

## 2020-09-26 VITALS — HEART RATE: 64 BPM | OXYGEN SATURATION: 99 % | TEMPERATURE: 99 F | RESPIRATION RATE: 18 BRPM

## 2020-09-26 VITALS
RESPIRATION RATE: 18 BRPM | HEIGHT: 62 IN | HEART RATE: 74 BPM | TEMPERATURE: 98 F | OXYGEN SATURATION: 100 % | SYSTOLIC BLOOD PRESSURE: 142 MMHG | DIASTOLIC BLOOD PRESSURE: 40 MMHG

## 2020-09-26 DIAGNOSIS — Z95.5 PRESENCE OF CORONARY ANGIOPLASTY IMPLANT AND GRAFT: Chronic | ICD-10-CM

## 2020-09-26 DIAGNOSIS — Z95.1 PRESENCE OF AORTOCORONARY BYPASS GRAFT: Chronic | ICD-10-CM

## 2020-09-26 DIAGNOSIS — Z98.890 OTHER SPECIFIED POSTPROCEDURAL STATES: Chronic | ICD-10-CM

## 2020-09-26 DIAGNOSIS — Z90.710 ACQUIRED ABSENCE OF BOTH CERVIX AND UTERUS: Chronic | ICD-10-CM

## 2020-09-26 PROBLEM — I10 ESSENTIAL (PRIMARY) HYPERTENSION: Chronic | Status: ACTIVE | Noted: 2020-07-20

## 2020-09-26 LAB
ALBUMIN SERPL ELPH-MCNC: 3.8 G/DL — SIGNIFICANT CHANGE UP (ref 3.3–5)
ALP SERPL-CCNC: 89 U/L — SIGNIFICANT CHANGE UP (ref 40–120)
ALT FLD-CCNC: 29 U/L — SIGNIFICANT CHANGE UP (ref 4–33)
ANION GAP SERPL CALC-SCNC: 12 MMO/L — SIGNIFICANT CHANGE UP (ref 7–14)
APTT BLD: 47.6 SEC — HIGH (ref 27–36.3)
AST SERPL-CCNC: 24 U/L — SIGNIFICANT CHANGE UP (ref 4–32)
BASOPHILS # BLD AUTO: 0.02 K/UL — SIGNIFICANT CHANGE UP (ref 0–0.2)
BASOPHILS NFR BLD AUTO: 0.3 % — SIGNIFICANT CHANGE UP (ref 0–2)
BILIRUB SERPL-MCNC: 0.4 MG/DL — SIGNIFICANT CHANGE UP (ref 0.2–1.2)
BUN SERPL-MCNC: 41 MG/DL — HIGH (ref 7–23)
CALCIUM SERPL-MCNC: 9.5 MG/DL — SIGNIFICANT CHANGE UP (ref 8.4–10.5)
CHLORIDE SERPL-SCNC: 101 MMOL/L — SIGNIFICANT CHANGE UP (ref 98–107)
CO2 SERPL-SCNC: 26 MMOL/L — SIGNIFICANT CHANGE UP (ref 22–31)
CREAT SERPL-MCNC: 1.38 MG/DL — HIGH (ref 0.5–1.3)
EOSINOPHIL # BLD AUTO: 0.14 K/UL — SIGNIFICANT CHANGE UP (ref 0–0.5)
EOSINOPHIL NFR BLD AUTO: 2.4 % — SIGNIFICANT CHANGE UP (ref 0–6)
GLUCOSE SERPL-MCNC: 224 MG/DL — HIGH (ref 70–99)
HCT VFR BLD CALC: 36.5 % — SIGNIFICANT CHANGE UP (ref 34.5–45)
HGB BLD-MCNC: 11 G/DL — LOW (ref 11.5–15.5)
IMM GRANULOCYTES NFR BLD AUTO: 0.2 % — SIGNIFICANT CHANGE UP (ref 0–1.5)
INR BLD: 1.28 — HIGH (ref 0.88–1.16)
LYMPHOCYTES # BLD AUTO: 1.92 K/UL — SIGNIFICANT CHANGE UP (ref 1–3.3)
LYMPHOCYTES # BLD AUTO: 32.9 % — SIGNIFICANT CHANGE UP (ref 13–44)
MAGNESIUM SERPL-MCNC: 2.4 MG/DL — SIGNIFICANT CHANGE UP (ref 1.6–2.6)
MCHC RBC-ENTMCNC: 25.8 PG — LOW (ref 27–34)
MCHC RBC-ENTMCNC: 30.1 % — LOW (ref 32–36)
MCV RBC AUTO: 85.7 FL — SIGNIFICANT CHANGE UP (ref 80–100)
MONOCYTES # BLD AUTO: 0.59 K/UL — SIGNIFICANT CHANGE UP (ref 0–0.9)
MONOCYTES NFR BLD AUTO: 10.1 % — SIGNIFICANT CHANGE UP (ref 2–14)
NEUTROPHILS # BLD AUTO: 3.16 K/UL — SIGNIFICANT CHANGE UP (ref 1.8–7.4)
NEUTROPHILS NFR BLD AUTO: 54.1 % — SIGNIFICANT CHANGE UP (ref 43–77)
NRBC # FLD: 0 K/UL — SIGNIFICANT CHANGE UP (ref 0–0)
PHOSPHATE SERPL-MCNC: 4.2 MG/DL — SIGNIFICANT CHANGE UP (ref 2.5–4.5)
PLATELET # BLD AUTO: 207 K/UL — SIGNIFICANT CHANGE UP (ref 150–400)
PMV BLD: 10.4 FL — SIGNIFICANT CHANGE UP (ref 7–13)
POTASSIUM SERPL-MCNC: 5.2 MMOL/L — SIGNIFICANT CHANGE UP (ref 3.5–5.3)
POTASSIUM SERPL-SCNC: 5.2 MMOL/L — SIGNIFICANT CHANGE UP (ref 3.5–5.3)
PROT SERPL-MCNC: 7.2 G/DL — SIGNIFICANT CHANGE UP (ref 6–8.3)
PROTHROM AB SERPL-ACNC: 14.4 SEC — HIGH (ref 10.6–13.6)
RBC # BLD: 4.26 M/UL — SIGNIFICANT CHANGE UP (ref 3.8–5.2)
RBC # FLD: 13.7 % — SIGNIFICANT CHANGE UP (ref 10.3–14.5)
SODIUM SERPL-SCNC: 139 MMOL/L — SIGNIFICANT CHANGE UP (ref 135–145)
WBC # BLD: 5.84 K/UL — SIGNIFICANT CHANGE UP (ref 3.8–10.5)
WBC # FLD AUTO: 5.84 K/UL — SIGNIFICANT CHANGE UP (ref 3.8–10.5)

## 2020-09-26 PROCEDURE — 99284 EMERGENCY DEPT VISIT MOD MDM: CPT

## 2020-09-26 PROCEDURE — 93970 EXTREMITY STUDY: CPT | Mod: 26

## 2020-09-26 PROCEDURE — 93926 LOWER EXTREMITY STUDY: CPT | Mod: 26,LT

## 2020-09-26 RX ORDER — GABAPENTIN 400 MG/1
300 CAPSULE ORAL ONCE
Refills: 0 | Status: COMPLETED | OUTPATIENT
Start: 2020-09-26 | End: 2020-09-26

## 2020-09-26 RX ADMIN — GABAPENTIN 300 MILLIGRAM(S): 400 CAPSULE ORAL at 23:50

## 2020-09-26 NOTE — ED PROVIDER NOTE - PSH
H/O: hysterectomy    S/P CABG x 3    S/P coronary artery stent placement    S/P hysterectomy    Stented coronary artery

## 2020-09-26 NOTE — ED PROVIDER NOTE - ATTENDING CONTRIBUTION TO CARE
I performed a history and physical exam of the patient and discussed their management with the resident.  I reviewed the resident's note and agree with the documented findings and plan of care except as noted below. My medical decision making and observations are as follows:    80F w/ PMHx of CAD s/p cardiac cath 4/2018, 9/2020, HTN, HLD, AFib on Eliquis, IDDM, who presents with R leg pain x2weeks in setting of recent cardiac stent placement w/ R fem artery access. Since cardiac cath 2 weeks ago, patient has had progressively worsening R LE pain with swelling.  Denies numbness/weakness of ectremity.  RLE is slightly more swollen than left with ttp especially of calf, equal DP pulses and femoral pulses b/l.  Concern for possible DVT, less likley arterial in nature.  WIll check duplex of lower ext and reassess. I performed a history and physical exam of the patient and discussed their management with the student/resident.  I reviewed the student/resident's note and agree with the documented findings and plan of care except as noted below. My medical decision making and observations are as follows:    80F w/ PMHx of CAD s/p cardiac cath 4/2018, 9/2020, HTN, HLD, AFib on Eliquis, IDDM, who presents with R leg pain x2weeks in setting of recent cardiac stent placement w/ R fem artery access. Since cardiac cath 2 weeks ago, patient has had progressively worsening R LE pain with swelling.  Denies numbness/weakness of ectremity.  RLE is slightly more swollen than left with ttp especially of calf, equal DP pulses and femoral pulses b/l.  Concern for possible DVT, less likley arterial in nature.  WIll check duplex of lower ext and reassess.

## 2020-09-26 NOTE — ED PROVIDER NOTE - PATIENT PORTAL LINK FT
You can access the FollowMyHealth Patient Portal offered by [facility] by registering at the following website: [email or web address]. By joining [facility]’s FollowMyHealth portal, you will also be able to view your health information using other applications (apps) compatible with our system.

## 2020-09-26 NOTE — ED PROVIDER NOTE - NSFOLLOWUPINSTRUCTIONS_ED_ALL_ED_FT
- Please follow up with your Primary Care Doctor within 48 hours. Bring your results from today.    - Tylenol up to 650 mg every 6 hours as needed for pain.    - Please take your Gabapentin as indicated.     - Be sure to return to the ED if you develop new, worsening, or any distressing symptoms.

## 2020-09-26 NOTE — ED ADULT NURSE REASSESSMENT NOTE - NS ED NURSE REASSESS COMMENT FT1
assumed care at this time. patient NAD at this time. respirations even and unlabored. awaiting US results. will continue to monitor.

## 2020-09-26 NOTE — ED PROVIDER NOTE - PROGRESS NOTE DETAILS
Rozina, PGY2: no images uploaded for venous study in pacs, calling Radiology to find out. Unable to reach [facility] tech. Will f/u

## 2020-09-26 NOTE — ED PROVIDER NOTE - PHYSICAL EXAMINATION
PHYSICAL EXAM:  GENERAL: NAD, well-developed  HEAD:  Atraumatic, Normocephalic  EYES: EOMI, PERRLA, conjunctiva and sclera clear  NECK: Supple, No JVD  CHEST/LUNG: Clear to auscultation bilaterally; No wheeze  HEART: Regular rate and rhythm; No murmurs, rubs, or gallops  ABDOMEN: Soft, Nontender, Nondistended; Bowel sounds present  EXTREMITIES:  R thigh soft but tender to palpation, no bruising, warmth, or edema noted. 2+ pulses of b/l fem arteries, no bruits or murmurs; diminished pulses of b/l PT and DP. 2+ b/l pitting edema of LE (R worse than L) from feet up to mid-calves.   PSYCH: AAOx3  NEUROLOGY: non-focal  SKIN: No rashes or lesions

## 2020-09-26 NOTE — ED PROVIDER NOTE - PMH
AF (atrial fibrillation)    CAD (coronary artery disease)    CAD (coronary artery disease)    Chronic systolic HF (heart failure)    Coronary artery disease    Diabetes    Diabetes type 2, uncontrolled    DM (diabetes mellitus)    H/O osteomyelitis  2018  Hyperlipemia    Hyperlipemia    Hypertension    Hypertension    MI (myocardial infarction)

## 2020-09-26 NOTE — ED PROVIDER NOTE - OBJECTIVE STATEMENT
80F poor historian, ?medication compliance, walks with rollator, with PMHx of HTN, HLD, AFib (on Eliquis), IDDM with neuropathy, CAD s/p cardiac cath 4/2018 @ [facility], HFrEF (EF 40-45% by ECHO 4/2018), osteomyelitis of R 1st  and 2nd toe with gangrene 4/2018 s/p toenail removal and s/p 6 week abx course, p/w right leg swelling x 3 days associated with pain in the leg 80F w/ PMHx of CAD s/p cardiac cath 4/2018, 9/2020, HTN, HLD, AFib on Eliquis, IDDM, who presents with R leg pain x2weeks in setting of recent cardiac stent placement w/ R fem artery access. Since cardiac cath 2 weeks ago, patient has had progressively worsening R LE pain with swelling. She denies RLE weakness, numbness, and tingling. No CP, SOB, fevers, n/v, abd pain. Patient follows outpatient cardiologist Dr. Nolvia Allen.

## 2020-09-26 NOTE — ED ADULT NURSE NOTE - OBJECTIVE STATEMENT
Pt received A&Ox3, with chief complaint of pain and swelling of RLE s/p stent placement by femoral artery. Swelling noted to b/l LE denies CP, denies SOB. Respirations even and unlabored. No apparent distress noted. Pt on blood thinners. MD martell in progress, Pt safety maintained, continue to monitor.

## 2020-09-26 NOTE — ED ADULT NURSE NOTE - NSIMPLEMENTINTERV_GEN_ALL_ED
Implemented All Fall with Harm Risk Interventions:  [place] to call system. Call bell, personal items and telephone within reach. Instruct patient to call for assistance. Room bathroom lighting operational. Non-slip footwear when patient is off stretcher. Physically safe environment: no spills, clutter or unnecessary equipment. Stretcher in lowest position, wheels locked, appropriate side rails in place. Provide visual cue, wrist band, yellow gown, etc. Monitor gait and stability. Monitor for mental status changes and reorient to person, place, and time. Review medications for side effects contributing to fall risk. Reinforce activity limits and safety measures with patient and family. Provide visual clues: red socks.

## 2020-09-26 NOTE — ED PROVIDER NOTE - CLINICAL SUMMARY MEDICAL DECISION MAKING FREE TEXT BOX
80F w/ PMH CAD, HTN, AFib on Eliquis, DM who p/w RLE pain x2 weeks in setting of recent cardiac cath w/ R fem access. On exam, RLE pitting edema up to mid-calves. Dispo pending RLE arterial and venous duplex.

## 2020-09-26 NOTE — ED ADULT NURSE NOTE - CHIEF COMPLAINT QUOTE
r leg swelling x 3 days with pain. seanies ean rnormally uses walker to ambulate. son cyn [phone] .  fs [phone]

## 2020-09-26 NOTE — ED ADULT TRIAGE NOTE - CHIEF COMPLAINT QUOTE
r leg swelling x 3 days with pain. seanies ean rnormally uses walker to ambulate. son cyn [phone] .  fs [phone]

## 2020-09-26 NOTE — ED ADULT NURSE REASSESSMENT NOTE - NS ED NURSE REASSESS COMMENT FT1
Pt appears to be resting in the semi-fowlers position, watching TV. Respirations are even & unlabored, denies chest pain, dyspnea, N/V/D, chills, fever, weakness, dizziness, headache, palpitations, cough. Call bell within reach, pt verbalizes understanding of how to use call bell.

## 2020-09-26 NOTE — ED PROVIDER NOTE - FAMILY HISTORY
No pertinent family history in first degree relatives     No pertinent family history in first degree relatives     Family history of pacemaker, Brother

## 2021-04-20 ENCOUNTER — INPATIENT (INPATIENT)
Facility: HOSPITAL | Age: 81
LOS: 9 days | Discharge: HOME HEALTH | End: 2021-04-30
Attending: INTERNAL MEDICINE | Admitting: INTERNAL MEDICINE
Payer: MEDICARE

## 2021-04-20 VITALS
SYSTOLIC BLOOD PRESSURE: 135 MMHG | HEART RATE: 86 BPM | OXYGEN SATURATION: 100 % | TEMPERATURE: 98 F | RESPIRATION RATE: 18 BRPM | HEIGHT: 62 IN | DIASTOLIC BLOOD PRESSURE: 80 MMHG

## 2021-04-20 DIAGNOSIS — Z95.5 PRESENCE OF CORONARY ANGIOPLASTY IMPLANT AND GRAFT: Chronic | ICD-10-CM

## 2021-04-20 DIAGNOSIS — I50.9 HEART FAILURE, UNSPECIFIED: ICD-10-CM

## 2021-04-20 DIAGNOSIS — Z90.710 ACQUIRED ABSENCE OF BOTH CERVIX AND UTERUS: Chronic | ICD-10-CM

## 2021-04-20 DIAGNOSIS — Z95.1 PRESENCE OF AORTOCORONARY BYPASS GRAFT: Chronic | ICD-10-CM

## 2021-04-20 DIAGNOSIS — Z98.890 OTHER SPECIFIED POSTPROCEDURAL STATES: Chronic | ICD-10-CM

## 2021-04-20 LAB
ALBUMIN SERPL ELPH-MCNC: 3.3 G/DL — SIGNIFICANT CHANGE UP (ref 3.3–5)
ALP SERPL-CCNC: 96 U/L — SIGNIFICANT CHANGE UP (ref 40–120)
ALT FLD-CCNC: 20 U/L — SIGNIFICANT CHANGE UP (ref 4–33)
ANION GAP SERPL CALC-SCNC: 7 MMOL/L — SIGNIFICANT CHANGE UP (ref 7–14)
APPEARANCE UR: CLEAR — SIGNIFICANT CHANGE UP
AST SERPL-CCNC: 19 U/L — SIGNIFICANT CHANGE UP (ref 4–32)
BASOPHILS # BLD AUTO: 0.02 K/UL — SIGNIFICANT CHANGE UP (ref 0–0.2)
BASOPHILS NFR BLD AUTO: 0.4 % — SIGNIFICANT CHANGE UP (ref 0–2)
BILIRUB SERPL-MCNC: 0.5 MG/DL — SIGNIFICANT CHANGE UP (ref 0.2–1.2)
BILIRUB UR-MCNC: NEGATIVE — SIGNIFICANT CHANGE UP
BUN SERPL-MCNC: 24 MG/DL — HIGH (ref 7–23)
CALCIUM SERPL-MCNC: 9.3 MG/DL — SIGNIFICANT CHANGE UP (ref 8.4–10.5)
CHLORIDE SERPL-SCNC: 102 MMOL/L — SIGNIFICANT CHANGE UP (ref 98–107)
CO2 SERPL-SCNC: 29 MMOL/L — SIGNIFICANT CHANGE UP (ref 22–31)
COLOR SPEC: SIGNIFICANT CHANGE UP
CREAT SERPL-MCNC: 1.35 MG/DL — HIGH (ref 0.5–1.3)
DIFF PNL FLD: NEGATIVE — SIGNIFICANT CHANGE UP
EOSINOPHIL # BLD AUTO: 0.16 K/UL — SIGNIFICANT CHANGE UP (ref 0–0.5)
EOSINOPHIL NFR BLD AUTO: 3.1 % — SIGNIFICANT CHANGE UP (ref 0–6)
GLUCOSE BLDC GLUCOMTR-MCNC: 137 MG/DL — HIGH (ref 70–99)
GLUCOSE SERPL-MCNC: 139 MG/DL — HIGH (ref 70–99)
GLUCOSE UR QL: NEGATIVE — SIGNIFICANT CHANGE UP
HCT VFR BLD CALC: 38.3 % — SIGNIFICANT CHANGE UP (ref 34.5–45)
HGB BLD-MCNC: 11.8 G/DL — SIGNIFICANT CHANGE UP (ref 11.5–15.5)
IANC: 2.07 K/UL — SIGNIFICANT CHANGE UP (ref 1.5–8.5)
IMM GRANULOCYTES NFR BLD AUTO: 0.2 % — SIGNIFICANT CHANGE UP (ref 0–1.5)
KETONES UR-MCNC: NEGATIVE — SIGNIFICANT CHANGE UP
LEUKOCYTE ESTERASE UR-ACNC: NEGATIVE — SIGNIFICANT CHANGE UP
LYMPHOCYTES # BLD AUTO: 2.29 K/UL — SIGNIFICANT CHANGE UP (ref 1–3.3)
LYMPHOCYTES # BLD AUTO: 44.6 % — HIGH (ref 13–44)
MCHC RBC-ENTMCNC: 23.8 PG — LOW (ref 27–34)
MCHC RBC-ENTMCNC: 30.8 GM/DL — LOW (ref 32–36)
MCV RBC AUTO: 77.2 FL — LOW (ref 80–100)
MONOCYTES # BLD AUTO: 0.58 K/UL — SIGNIFICANT CHANGE UP (ref 0–0.9)
MONOCYTES NFR BLD AUTO: 11.3 % — SIGNIFICANT CHANGE UP (ref 2–14)
NEUTROPHILS # BLD AUTO: 2.07 K/UL — SIGNIFICANT CHANGE UP (ref 1.8–7.4)
NEUTROPHILS NFR BLD AUTO: 40.4 % — LOW (ref 43–77)
NITRITE UR-MCNC: NEGATIVE — SIGNIFICANT CHANGE UP
NRBC # BLD: 0 /100 WBCS — SIGNIFICANT CHANGE UP
NRBC # FLD: 0 K/UL — SIGNIFICANT CHANGE UP
NT-PROBNP SERPL-SCNC: 582 PG/ML — HIGH
PH UR: 7 — SIGNIFICANT CHANGE UP (ref 5–8)
PLATELET # BLD AUTO: 243 K/UL — SIGNIFICANT CHANGE UP (ref 150–400)
POTASSIUM SERPL-MCNC: 4.9 MMOL/L — SIGNIFICANT CHANGE UP (ref 3.5–5.3)
POTASSIUM SERPL-SCNC: 4.9 MMOL/L — SIGNIFICANT CHANGE UP (ref 3.5–5.3)
PROT SERPL-MCNC: 7.2 G/DL — SIGNIFICANT CHANGE UP (ref 6–8.3)
PROT UR-MCNC: ABNORMAL
RBC # BLD: 4.96 M/UL — SIGNIFICANT CHANGE UP (ref 3.8–5.2)
RBC # FLD: 16.4 % — HIGH (ref 10.3–14.5)
SARS-COV-2 RNA SPEC QL NAA+PROBE: SIGNIFICANT CHANGE UP
SODIUM SERPL-SCNC: 138 MMOL/L — SIGNIFICANT CHANGE UP (ref 135–145)
SP GR SPEC: 1.01 — SIGNIFICANT CHANGE UP (ref 1.01–1.02)
TROPONIN T, HIGH SENSITIVITY RESULT: 29 NG/L — SIGNIFICANT CHANGE UP
TROPONIN T, HIGH SENSITIVITY RESULT: 29 NG/L — SIGNIFICANT CHANGE UP
UROBILINOGEN FLD QL: SIGNIFICANT CHANGE UP
WBC # BLD: 5.13 K/UL — SIGNIFICANT CHANGE UP (ref 3.8–10.5)
WBC # FLD AUTO: 5.13 K/UL — SIGNIFICANT CHANGE UP (ref 3.8–10.5)

## 2021-04-20 PROCEDURE — 99285 EMERGENCY DEPT VISIT HI MDM: CPT | Mod: 25

## 2021-04-20 PROCEDURE — 71045 X-RAY EXAM CHEST 1 VIEW: CPT | Mod: 26

## 2021-04-20 PROCEDURE — 73562 X-RAY EXAM OF KNEE 3: CPT | Mod: 26,50

## 2021-04-20 PROCEDURE — 93970 EXTREMITY STUDY: CPT | Mod: 26

## 2021-04-20 PROCEDURE — 73521 X-RAY EXAM HIPS BI 2 VIEWS: CPT | Mod: 26

## 2021-04-20 PROCEDURE — 93010 ELECTROCARDIOGRAM REPORT: CPT

## 2021-04-20 RX ORDER — FUROSEMIDE 40 MG
40 TABLET ORAL ONCE
Refills: 0 | Status: COMPLETED | OUTPATIENT
Start: 2021-04-20 | End: 2021-04-20

## 2021-04-20 RX ORDER — EZETIMIBE 10 MG/1
1 TABLET ORAL
Qty: 0 | Refills: 0 | DISCHARGE

## 2021-04-20 RX ORDER — METOPROLOL TARTRATE 50 MG
1 TABLET ORAL
Qty: 0 | Refills: 0 | DISCHARGE

## 2021-04-20 RX ORDER — APIXABAN 2.5 MG/1
5 TABLET, FILM COATED ORAL
Refills: 0 | Status: DISCONTINUED | OUTPATIENT
Start: 2021-04-20 | End: 2021-04-30

## 2021-04-20 RX ORDER — ASPIRIN/CALCIUM CARB/MAGNESIUM 324 MG
1 TABLET ORAL
Qty: 0 | Refills: 0 | DISCHARGE

## 2021-04-20 RX ORDER — MONTELUKAST 4 MG/1
1 TABLET, CHEWABLE ORAL
Qty: 0 | Refills: 0 | DISCHARGE

## 2021-04-20 RX ORDER — ROSUVASTATIN CALCIUM 5 MG/1
1 TABLET ORAL
Qty: 0 | Refills: 0 | DISCHARGE

## 2021-04-20 RX ORDER — INSULIN GLARGINE 100 [IU]/ML
41 INJECTION, SOLUTION SUBCUTANEOUS ONCE
Refills: 0 | Status: COMPLETED | OUTPATIENT
Start: 2021-04-20 | End: 2021-04-21

## 2021-04-20 RX ORDER — ACETAMINOPHEN 500 MG
975 TABLET ORAL ONCE
Refills: 0 | Status: COMPLETED | OUTPATIENT
Start: 2021-04-20 | End: 2021-04-20

## 2021-04-20 RX ORDER — CILOSTAZOL 100 MG/1
1 TABLET ORAL
Qty: 0 | Refills: 0 | DISCHARGE

## 2021-04-20 RX ADMIN — APIXABAN 5 MILLIGRAM(S): 2.5 TABLET, FILM COATED ORAL at 23:52

## 2021-04-20 RX ADMIN — Medication 975 MILLIGRAM(S): at 17:02

## 2021-04-20 RX ADMIN — Medication 40 MILLIGRAM(S): at 19:14

## 2021-04-20 NOTE — ED PROVIDER NOTE - MUSCULOSKELETAL, MLM
Spine appears normal, range of motion is not limited, no muscle or joint tenderness. No bony tenderness. Full rom of hips, knee, feet. No bruising. Atraumatic exam.

## 2021-04-20 NOTE — ED PROVIDER NOTE - CARDIAC, MLM
Normal rate, regular rhythm.  Heart sounds S1, S2.  No murmurs, rubs or gallops. Significant nonpitting b/l edema. No calf tenderness.

## 2021-04-20 NOTE — ED PROVIDER NOTE - PRO INTERPRETER NEED 2
Quality 226: Preventive Care And Screening: Tobacco Use: Screening And Cessation Intervention: Patient screened for tobacco use and is an ex/non-smoker Detail Level: Detailed Quality 130: Documentation Of Current Medications In The Medical Record: Current Medications Documented Quality 431: Preventive Care And Screening: Unhealthy Alcohol Use - Screening: Patient screened for unhealthy alcohol use using a single question and scores less than 2 times per year Quality 128: Preventive Care And Screening: Body Mass Index (Bmi) Screening And Follow-Up Plan: BMI not documented, reason not otherwise specified. Quality 110: Preventive Care And Screening: Influenza Immunization: Influenza Immunization not Administered because Patient Refused. English

## 2021-04-20 NOTE — ED ADULT NURSE REASSESSMENT NOTE - NS ED NURSE REASSESS COMMENT FT1
Report received from TESSY Reeves. Pt. received A&Ox2, not oriented to time. Pt. received with 20G left ac, on cardiac monitor, with bilateral lower extremity edema. Offers no complaints at present. Respirations even & unlabored on room air. Skin intact. Admitted to Tele, report given to [facility] 2 TESSY Bosch. Pt. transported to [facility] 2 at this time.

## 2021-04-20 NOTE — ED PROVIDER NOTE - OBJECTIVE STATEMENT
80 y/o female with pmhx of noninsulin dependant DM, presents to ED c/o b/l LE edema x 2 days. Pt states she returned from [place] vacation 2 weeks ago. Pt states she had a mechanical fall 2 weeks ago in [place], states her cane dropped and she fell on to her right leg. No head trauma or LOC. No preceding symptoms. Pt has been ambulatory since fall and did not seek medical care. Pt c/o diffuse b/l leg edema with pain. No weakness, numbness, tingling, redness, back pain, headache, vomiting, ataxia, dizziness, cp, sob, cough, abd pain, n/v. Pt has a cardiologist she does not believe is affiliated. Pt states she has had swelling in her legs before and does not take water pill. Poor historian. Denies any other medical history.  As per chart, pt has hx of CABG x 3 and MI.

## 2021-04-20 NOTE — ED ADULT NURSE NOTE - OBJECTIVE STATEMENT
80 y/o F received to room 29 c/o b/l leg swelling. Pt a&ox2 (oriented to self and situation) and ambulatory with assistance. Pt states for 1 week shes noticed worsening b/l swelling to her lower extremities. pt is unsure what medication she is currently taking but denies missing any dose. Pt endorses elevating legs at night with no change in swelling. PT respirations even and unlabroed. pt abdomen soft nontender nondistended. pt skin intact. Pt denies fevers, chills, sob, cp, plaptions, n/v/d, or ha. VS as noted, call bell in reach, comfort measures provided pt placed on cardiac monitor

## 2021-04-20 NOTE — ED PROVIDER NOTE - TEMPLATE, MLM
Reason For Visit  LUCI CELESTIN is here today for a nurse visit for TB reading TB test read- Test negative 0mm.      Current Meds   1. [facility] Contour Test STRP; test once daily;   Therapy: 29Oct2008 to  Requested for:  Recorded   2. Aspirin 81 MG TABS; 1 Every Day;   Therapy: 05Aug2011 to  Requested for:  Recorded   3. Lisinopril 20 MG Oral Tablet; TAKE 1 TABLET BY MOUTH EVERY DAY FOR BLOOD   PRESSURE;   Therapy: 23Nov2011 to (Evaluate:14Jan2018)  Requested for: 16Oct2017; Last   Rx:16Oct2017 Ordered   4. MetFORMIN HCl - 1000 MG Oral Tablet; TAKE 1 TABLET BY MOUTH TWICE DAILY;   Therapy: 11Aug2011 to (Evaluate:25Nov2017)  Requested for: 26Oct2017; Last   Rx:26Oct2017 Ordered    Allergies  Penicillins    Plan   1. PPD    Signatures   Electronically signed by : Pricila Whitten R.N.; Nov 8 2017  9:12AM CST     General

## 2021-04-20 NOTE — ED PROVIDER NOTE - SKIN, MLM
Skin normal color for race, warm, dry and intact. No evidence of rash. No abrasions.
How Severe Are Your Spot(S)?: mild
What Type Of Note Output Would You Prefer (Optional)?: Standard Output
What Is The Reason For Today's Visit?: Full Body Skin Examination with No Concerns
What Is The Reason For Today's Visit? (Being Monitored For X): concerning skin lesions on an annual basis

## 2021-04-20 NOTE — ED ADULT TRIAGE NOTE - CHIEF COMPLAINT QUOTE
Pt h/o HTN, DM c/o swelling to b/l legs worsening over the past week, denies shortness of breath, denies chest pain, no headache/dizziness, no abd pain, no n/v/d, afebrile.

## 2021-04-20 NOTE — ED PROVIDER NOTE - CLINICAL SUMMARY MEDICAL DECISION MAKING FREE TEXT BOX
82 y/o female with pmhx of noninsulin dependant DM, CABG, MI, presents to ED c/o b/l LE edema x 2 days. Pt states she returned from [place] vacation 2 weeks ago. Pt states she had a mechanical fall 2 weeks ago in [place], states her cane dropped and she fell on to her right leg. No head trauma or LOC. No preceding symptoms. Pt has been ambulatory since fall and did not seek medical care. Pt c/o diffuse b/l leg edema with pain. No weakness, numbness, tingling, redness, back pain, headache, vomiting, ataxia, dizziness, cp, sob, cough, abd pain, n/v. Pt has a cardiologist she does not believe is affiliated. Pt states she has had swelling in her legs before and does not take water pill. on exam  pt with clear lungs, NAD. significant b/l LE nonpitting edema, no signs of trauma, full rom of all joints. plan to check XR hip/knee given fall, US r/o DVT. concern for CHF exacerbation, will check labs troponin proBNP cxr and ekg, admit.

## 2021-04-21 DIAGNOSIS — R60.0 LOCALIZED EDEMA: ICD-10-CM

## 2021-04-21 DIAGNOSIS — I48.20 CHRONIC ATRIAL FIBRILLATION, UNSPECIFIED: ICD-10-CM

## 2021-04-21 DIAGNOSIS — E78.5 HYPERLIPIDEMIA, UNSPECIFIED: ICD-10-CM

## 2021-04-21 DIAGNOSIS — I10 ESSENTIAL (PRIMARY) HYPERTENSION: ICD-10-CM

## 2021-04-21 DIAGNOSIS — N18.32 CHRONIC KIDNEY DISEASE, STAGE 3B: ICD-10-CM

## 2021-04-21 DIAGNOSIS — I25.10 ATHEROSCLEROTIC HEART DISEASE OF NATIVE CORONARY ARTERY WITHOUT ANGINA PECTORIS: ICD-10-CM

## 2021-04-21 DIAGNOSIS — Z29.9 ENCOUNTER FOR PROPHYLACTIC MEASURES, UNSPECIFIED: ICD-10-CM

## 2021-04-21 DIAGNOSIS — I50.23 ACUTE ON CHRONIC SYSTOLIC (CONGESTIVE) HEART FAILURE: ICD-10-CM

## 2021-04-21 DIAGNOSIS — I50.22 CHRONIC SYSTOLIC (CONGESTIVE) HEART FAILURE: ICD-10-CM

## 2021-04-21 DIAGNOSIS — E11.40 TYPE 2 DIABETES MELLITUS WITH DIABETIC NEUROPATHY, UNSPECIFIED: ICD-10-CM

## 2021-04-21 LAB
A1C WITH ESTIMATED AVERAGE GLUCOSE RESULT: 9.2 % — HIGH (ref 4–5.6)
ANION GAP SERPL CALC-SCNC: 9 MMOL/L — SIGNIFICANT CHANGE UP (ref 7–14)
BASOPHILS # BLD AUTO: 0.03 K/UL — SIGNIFICANT CHANGE UP (ref 0–0.2)
BASOPHILS NFR BLD AUTO: 0.6 % — SIGNIFICANT CHANGE UP (ref 0–2)
BUN SERPL-MCNC: 24 MG/DL — HIGH (ref 7–23)
CALCIUM SERPL-MCNC: 9.5 MG/DL — SIGNIFICANT CHANGE UP (ref 8.4–10.5)
CHLORIDE SERPL-SCNC: 100 MMOL/L — SIGNIFICANT CHANGE UP (ref 98–107)
CHOLEST SERPL-MCNC: 134 MG/DL — SIGNIFICANT CHANGE UP
CO2 SERPL-SCNC: 26 MMOL/L — SIGNIFICANT CHANGE UP (ref 22–31)
CREAT SERPL-MCNC: 1.34 MG/DL — HIGH (ref 0.5–1.3)
CULTURE RESULTS: SIGNIFICANT CHANGE UP
EOSINOPHIL # BLD AUTO: 0.16 K/UL — SIGNIFICANT CHANGE UP (ref 0–0.5)
EOSINOPHIL NFR BLD AUTO: 3.3 % — SIGNIFICANT CHANGE UP (ref 0–6)
ESTIMATED AVERAGE GLUCOSE: 217 MG/DL — HIGH (ref 68–114)
GLUCOSE BLDC GLUCOMTR-MCNC: 133 MG/DL — HIGH (ref 70–99)
GLUCOSE BLDC GLUCOMTR-MCNC: 145 MG/DL — HIGH (ref 70–99)
GLUCOSE BLDC GLUCOMTR-MCNC: 189 MG/DL — HIGH (ref 70–99)
GLUCOSE BLDC GLUCOMTR-MCNC: 199 MG/DL — HIGH (ref 70–99)
GLUCOSE BLDC GLUCOMTR-MCNC: 233 MG/DL — HIGH (ref 70–99)
GLUCOSE BLDC GLUCOMTR-MCNC: 88 MG/DL — SIGNIFICANT CHANGE UP (ref 70–99)
GLUCOSE SERPL-MCNC: 215 MG/DL — HIGH (ref 70–99)
HCT VFR BLD CALC: 40.7 % — SIGNIFICANT CHANGE UP (ref 34.5–45)
HDLC SERPL-MCNC: 52 MG/DL — SIGNIFICANT CHANGE UP
HGB BLD-MCNC: 12.8 G/DL — SIGNIFICANT CHANGE UP (ref 11.5–15.5)
IANC: 2.16 K/UL — SIGNIFICANT CHANGE UP (ref 1.5–8.5)
IMM GRANULOCYTES NFR BLD AUTO: 0.2 % — SIGNIFICANT CHANGE UP (ref 0–1.5)
LIPID PNL WITH DIRECT LDL SERPL: 62 MG/DL — SIGNIFICANT CHANGE UP
LYMPHOCYTES # BLD AUTO: 2.12 K/UL — SIGNIFICANT CHANGE UP (ref 1–3.3)
LYMPHOCYTES # BLD AUTO: 43.6 % — SIGNIFICANT CHANGE UP (ref 13–44)
MAGNESIUM SERPL-MCNC: 2 MG/DL — SIGNIFICANT CHANGE UP (ref 1.6–2.6)
MCHC RBC-ENTMCNC: 24 PG — LOW (ref 27–34)
MCHC RBC-ENTMCNC: 31.4 GM/DL — LOW (ref 32–36)
MCV RBC AUTO: 76.2 FL — LOW (ref 80–100)
MONOCYTES # BLD AUTO: 0.38 K/UL — SIGNIFICANT CHANGE UP (ref 0–0.9)
MONOCYTES NFR BLD AUTO: 7.8 % — SIGNIFICANT CHANGE UP (ref 2–14)
NEUTROPHILS # BLD AUTO: 2.16 K/UL — SIGNIFICANT CHANGE UP (ref 1.8–7.4)
NEUTROPHILS NFR BLD AUTO: 44.5 % — SIGNIFICANT CHANGE UP (ref 43–77)
NON HDL CHOLESTEROL: 82 MG/DL — SIGNIFICANT CHANGE UP
NRBC # BLD: 0 /100 WBCS — SIGNIFICANT CHANGE UP
NRBC # FLD: 0 K/UL — SIGNIFICANT CHANGE UP
PHOSPHATE SERPL-MCNC: 3.3 MG/DL — SIGNIFICANT CHANGE UP (ref 2.5–4.5)
PLATELET # BLD AUTO: 244 K/UL — SIGNIFICANT CHANGE UP (ref 150–400)
POTASSIUM SERPL-MCNC: 4.7 MMOL/L — SIGNIFICANT CHANGE UP (ref 3.5–5.3)
POTASSIUM SERPL-SCNC: 4.7 MMOL/L — SIGNIFICANT CHANGE UP (ref 3.5–5.3)
RBC # BLD: 5.34 M/UL — HIGH (ref 3.8–5.2)
RBC # FLD: 16.4 % — HIGH (ref 10.3–14.5)
SODIUM SERPL-SCNC: 135 MMOL/L — SIGNIFICANT CHANGE UP (ref 135–145)
SPECIMEN SOURCE: SIGNIFICANT CHANGE UP
TRIGL SERPL-MCNC: 102 MG/DL — SIGNIFICANT CHANGE UP
TSH SERPL-MCNC: 1.36 UIU/ML — SIGNIFICANT CHANGE UP (ref 0.27–4.2)
WBC # BLD: 4.86 K/UL — SIGNIFICANT CHANGE UP (ref 3.8–10.5)
WBC # FLD AUTO: 4.86 K/UL — SIGNIFICANT CHANGE UP (ref 3.8–10.5)

## 2021-04-21 PROCEDURE — 99223 1ST HOSP IP/OBS HIGH 75: CPT | Mod: GC

## 2021-04-21 PROCEDURE — 12345: CPT | Mod: NC

## 2021-04-21 PROCEDURE — 93306 TTE W/DOPPLER COMPLETE: CPT | Mod: 26

## 2021-04-21 RX ORDER — INSULIN LISPRO 100/ML
VIAL (ML) SUBCUTANEOUS AT BEDTIME
Refills: 0 | Status: DISCONTINUED | OUTPATIENT
Start: 2021-04-21 | End: 2021-04-30

## 2021-04-21 RX ORDER — GABAPENTIN 400 MG/1
300 CAPSULE ORAL THREE TIMES A DAY
Refills: 0 | Status: DISCONTINUED | OUTPATIENT
Start: 2021-04-21 | End: 2021-04-30

## 2021-04-21 RX ORDER — DEXTROSE 50 % IN WATER 50 %
25 SYRINGE (ML) INTRAVENOUS ONCE
Refills: 0 | Status: DISCONTINUED | OUTPATIENT
Start: 2021-04-21 | End: 2021-04-30

## 2021-04-21 RX ORDER — INSULIN GLARGINE 100 [IU]/ML
41 INJECTION, SOLUTION SUBCUTANEOUS AT BEDTIME
Refills: 0 | Status: DISCONTINUED | OUTPATIENT
Start: 2021-04-21 | End: 2021-04-21

## 2021-04-21 RX ORDER — ATORVASTATIN CALCIUM 80 MG/1
80 TABLET, FILM COATED ORAL AT BEDTIME
Refills: 0 | Status: DISCONTINUED | OUTPATIENT
Start: 2021-04-21 | End: 2021-04-30

## 2021-04-21 RX ORDER — DEXTROSE 50 % IN WATER 50 %
25 SYRINGE (ML) INTRAVENOUS ONCE
Refills: 0 | Status: DISCONTINUED | OUTPATIENT
Start: 2021-04-21 | End: 2021-04-21

## 2021-04-21 RX ORDER — INSULIN LISPRO 100/ML
3 VIAL (ML) SUBCUTANEOUS ONCE
Refills: 0 | Status: COMPLETED | OUTPATIENT
Start: 2021-04-21 | End: 2021-04-21

## 2021-04-21 RX ORDER — ACETAMINOPHEN 500 MG
650 TABLET ORAL EVERY 8 HOURS
Refills: 0 | Status: DISCONTINUED | OUTPATIENT
Start: 2021-04-21 | End: 2021-04-30

## 2021-04-21 RX ORDER — GLUCAGON INJECTION, SOLUTION 0.5 MG/.1ML
1 INJECTION, SOLUTION SUBCUTANEOUS ONCE
Refills: 0 | Status: DISCONTINUED | OUTPATIENT
Start: 2021-04-21 | End: 2021-04-21

## 2021-04-21 RX ORDER — SODIUM CHLORIDE 9 MG/ML
1000 INJECTION, SOLUTION INTRAVENOUS
Refills: 0 | Status: DISCONTINUED | OUTPATIENT
Start: 2021-04-21 | End: 2021-04-30

## 2021-04-21 RX ORDER — DEXTROSE 50 % IN WATER 50 %
12.5 SYRINGE (ML) INTRAVENOUS ONCE
Refills: 0 | Status: DISCONTINUED | OUTPATIENT
Start: 2021-04-21 | End: 2021-04-21

## 2021-04-21 RX ORDER — INSULIN GLARGINE 100 [IU]/ML
30 INJECTION, SOLUTION SUBCUTANEOUS AT BEDTIME
Refills: 0 | Status: DISCONTINUED | OUTPATIENT
Start: 2021-04-21 | End: 2021-04-23

## 2021-04-21 RX ORDER — INSULIN LISPRO 100/ML
5 VIAL (ML) SUBCUTANEOUS
Refills: 0 | Status: DISCONTINUED | OUTPATIENT
Start: 2021-04-22 | End: 2021-04-23

## 2021-04-21 RX ORDER — SODIUM CHLORIDE 9 MG/ML
1000 INJECTION, SOLUTION INTRAVENOUS
Refills: 0 | Status: DISCONTINUED | OUTPATIENT
Start: 2021-04-21 | End: 2021-04-21

## 2021-04-21 RX ORDER — METOPROLOL TARTRATE 50 MG
25 TABLET ORAL DAILY
Refills: 0 | Status: DISCONTINUED | OUTPATIENT
Start: 2021-04-21 | End: 2021-04-28

## 2021-04-21 RX ORDER — FUROSEMIDE 40 MG
40 TABLET ORAL DAILY
Refills: 0 | Status: DISCONTINUED | OUTPATIENT
Start: 2021-04-21 | End: 2021-04-22

## 2021-04-21 RX ORDER — INSULIN LISPRO 100/ML
VIAL (ML) SUBCUTANEOUS
Refills: 0 | Status: DISCONTINUED | OUTPATIENT
Start: 2021-04-21 | End: 2021-04-30

## 2021-04-21 RX ORDER — CILOSTAZOL 100 MG/1
50 TABLET ORAL
Refills: 0 | Status: DISCONTINUED | OUTPATIENT
Start: 2021-04-21 | End: 2021-04-30

## 2021-04-21 RX ORDER — DEXTROSE 50 % IN WATER 50 %
15 SYRINGE (ML) INTRAVENOUS ONCE
Refills: 0 | Status: DISCONTINUED | OUTPATIENT
Start: 2021-04-21 | End: 2021-04-21

## 2021-04-21 RX ORDER — DEXTROSE 50 % IN WATER 50 %
12.5 SYRINGE (ML) INTRAVENOUS ONCE
Refills: 0 | Status: DISCONTINUED | OUTPATIENT
Start: 2021-04-21 | End: 2021-04-30

## 2021-04-21 RX ORDER — DEXTROSE 50 % IN WATER 50 %
15 SYRINGE (ML) INTRAVENOUS ONCE
Refills: 0 | Status: DISCONTINUED | OUTPATIENT
Start: 2021-04-21 | End: 2021-04-30

## 2021-04-21 RX ORDER — GLUCAGON INJECTION, SOLUTION 0.5 MG/.1ML
1 INJECTION, SOLUTION SUBCUTANEOUS ONCE
Refills: 0 | Status: DISCONTINUED | OUTPATIENT
Start: 2021-04-21 | End: 2021-04-22

## 2021-04-21 RX ORDER — ASPIRIN/CALCIUM CARB/MAGNESIUM 324 MG
81 TABLET ORAL DAILY
Refills: 0 | Status: DISCONTINUED | OUTPATIENT
Start: 2021-04-21 | End: 2021-04-30

## 2021-04-21 RX ORDER — MONTELUKAST 4 MG/1
10 TABLET, CHEWABLE ORAL DAILY
Refills: 0 | Status: DISCONTINUED | OUTPATIENT
Start: 2021-04-21 | End: 2021-04-30

## 2021-04-21 RX ORDER — INSULIN LISPRO 100/ML
VIAL (ML) SUBCUTANEOUS AT BEDTIME
Refills: 0 | Status: DISCONTINUED | OUTPATIENT
Start: 2021-04-21 | End: 2021-04-21

## 2021-04-21 RX ORDER — INSULIN LISPRO 100/ML
VIAL (ML) SUBCUTANEOUS
Refills: 0 | Status: DISCONTINUED | OUTPATIENT
Start: 2021-04-21 | End: 2021-04-21

## 2021-04-21 RX ADMIN — ATORVASTATIN CALCIUM 80 MILLIGRAM(S): 80 TABLET, FILM COATED ORAL at 19:27

## 2021-04-21 RX ADMIN — Medication 650 MILLIGRAM(S): at 19:27

## 2021-04-21 RX ADMIN — Medication 25 MILLIGRAM(S): at 05:38

## 2021-04-21 RX ADMIN — GABAPENTIN 300 MILLIGRAM(S): 400 CAPSULE ORAL at 13:16

## 2021-04-21 RX ADMIN — Medication 81 MILLIGRAM(S): at 13:16

## 2021-04-21 RX ADMIN — APIXABAN 5 MILLIGRAM(S): 2.5 TABLET, FILM COATED ORAL at 05:37

## 2021-04-21 RX ADMIN — CILOSTAZOL 50 MILLIGRAM(S): 100 TABLET ORAL at 05:38

## 2021-04-21 RX ADMIN — Medication 40 MILLIGRAM(S): at 05:38

## 2021-04-21 RX ADMIN — Medication 2: at 13:15

## 2021-04-21 RX ADMIN — GABAPENTIN 300 MILLIGRAM(S): 400 CAPSULE ORAL at 19:27

## 2021-04-21 RX ADMIN — APIXABAN 5 MILLIGRAM(S): 2.5 TABLET, FILM COATED ORAL at 17:59

## 2021-04-21 RX ADMIN — GABAPENTIN 300 MILLIGRAM(S): 400 CAPSULE ORAL at 05:37

## 2021-04-21 RX ADMIN — MONTELUKAST 10 MILLIGRAM(S): 4 TABLET, CHEWABLE ORAL at 13:16

## 2021-04-21 RX ADMIN — CILOSTAZOL 50 MILLIGRAM(S): 100 TABLET ORAL at 19:27

## 2021-04-21 RX ADMIN — INSULIN GLARGINE 41 UNIT(S): 100 INJECTION, SOLUTION SUBCUTANEOUS at 01:06

## 2021-04-21 RX ADMIN — Medication 3 UNIT(S): at 17:59

## 2021-04-21 RX ADMIN — INSULIN GLARGINE 30 UNIT(S): 100 INJECTION, SOLUTION SUBCUTANEOUS at 21:33

## 2021-04-21 NOTE — H&P ADULT - NSICDXPASTSURGICALHX_GEN_ALL_CORE_FT
PAST SURGICAL HISTORY:  H/O: hysterectomy     S/P CABG x 3     S/P coronary artery stent placement     S/P hysterectomy     Stented coronary artery

## 2021-04-21 NOTE — H&P ADULT - ASSESSMENT
82 y/o F w PMHx of HTN, HLD, AFib on Eliquis, IDDM w/ neuropathy, sHFrEF (EF 40-45% TTE 4/2018), CAD s/p cardiac cath 4/2018 @ [facility] (pLAD 20% ISR, oD2 60%, dOM3 small with 99% ISR but supplied by collaterals from OM2, mRCA 30% with plan for aggressive medical management), osteomyelitis of R 1st & 2nd toe with gangrene 4/2018 s/p toenail removal & abx course presents to the hospital w/ complaints of bilateral LE edema x3 days. Admitted for likely CHF exacerbation.  82 y/o F w PMHx of HTN, HLD, AFib on Eliquis, IDDM w/ neuropathy, sHFrEF (EF 40-45% TTE 4/2018), CAD s/p cardiac cath 4/2018 @ [facility] (pLAD 20% ISR, oD2 60%, dOM3 small with 99% ISR but supplied by collaterals from OM2, mRCA 30% with plan for aggressive medical management), osteomyelitis of R 1st & 2nd toe with gangrene 4/2018 s/p toenail removal & abx course presents to the hospital w/ complaints of bilateral LE edema x3 days. Likely 2/2 trauma but cannot exclude CHF exacerbation.

## 2021-04-21 NOTE — H&P ADULT - PROBLEM SELECTOR PLAN 4
- On Tresiba 52U at bedtime w/ Novolog sliding scale  - Will give Lantus 41U now and titrate PRN  - Low ISS w/ FS qAC and qHS  - A1c in AM  - Diabetic diet - Normotensive  - Will c/w home Metoprolol w/ hold parameters  - Low sodium diet

## 2021-04-21 NOTE — H&P ADULT - PROBLEM SELECTOR PROBLEM 4
Type 2 diabetes mellitus with diabetic neuropathy, with long-term current use of insulin Hypertension, unspecified type

## 2021-04-21 NOTE — CONSULT NOTE ADULT - PROBLEM SELECTOR RECOMMENDATION 3
Pt noted to be on rosuvastatin 20 mg qd and zetia 10 mg qd at home. Lipid profile within goal.   - C/w atorvastatin 80 mg qhs while inpatient.    *Insulin orders have been updated to reflect the contents of this note.

## 2021-04-21 NOTE — CONSULT NOTE ADULT - ASSESSMENT
80 y/o F w PMHx of HTN, HLD, AFib on Eliquis, IDDM w/ neuropathy, sHFrEF (EF 40-45% TTE 4/2018), CAD s/p cardiac cath 4/2018 @ [facility] (pLAD 20% ISR, oD2 60%, dOM3 small with 99% ISR but supplied by collaterals from OM2, mRCA 30% with plan for aggressive medical management), osteomyelitis of R 1st & 2nd toe with gangrene 4/2018 s/p toenail removal & abx course presents to the hospital w/ complaints of bilateral LE edema x3 days, w/ endocrine c/s for diabetes management after pt's HgbA1c was found to be 9.2% on admission.

## 2021-04-21 NOTE — H&P ADULT - PROBLEM SELECTOR PROBLEM 5
Coronary artery disease, angina presence unspecified, unspecified vessel or lesion type, unspecified whether native or transplanted heart Type 2 diabetes mellitus with diabetic neuropathy, with long-term current use of insulin

## 2021-04-21 NOTE — CONSULT NOTE ADULT - SUBJECTIVE AND OBJECTIVE BOX
NOTE INCOMPLETE/ IN PROGRESS  *Please wait for attending attestation for official recommendations.     HPI:  80 y/o F w PMHx of HTN, HLD, AFib on Eliquis, IDDM w/ neuropathy, sHFrEF (EF 40-45% TTE 4/2018), CAD s/p cardiac cath 4/2018 @ [facility] (pLAD 20% ISR, oD2 60%, dOM3 small with 99% ISR but supplied by collaterals from [facility], mRCA 30% with plan for aggressive medical management), osteomyelitis of R 1st & 2nd toe with gangrene 4/2018 s/p toenail removal & abx course presents to the hospital w/ complaints of bilateral LE edema x3 days. Additional history obtained from son, Arturo () and daughter-in-law,  (). Patient states she was in [place] visiting her son and returned 1 week ago. While there, she had a mechanical fall but did not sustain a head injury or LOC. Denies precipitating symptoms (i.e. chest pain, palpitations, shortness of breath, dizziness, or headache). Reports dropping her cane on her feet; had mild pain but was able to ambulate w/ her cane at baseline. Since returning, she has noticed increased b/l LE edema (R>L) associated w/ mild pain. Denies fevers, chills, malaise, myalgias, anorexia, weight changes (loss or gain), night sweats, generalized fatigue, lightheadedness, dizziness, syncope, headache, changes in vision, chest pain, palpitations, diaphoresis, SOB, MORAES, PND, orthopnea, cough, wheezing, abdominal pain, N/V/C/D, BRBPR, melena, urinary incontinence/frequency, dysuria, hematuria, numbness/weakness/tingling, suicidal or homicidal ideation, or any other complaints.    ED Course: Temp 97.8-98.3F, HR 72-89, //72, SpO2 % on RA, RR 16-20; s/p Tylenol 975mg PO x1, Lasix 40mg IVP x1 (21 Apr 2021 00:08)        PAST MEDICAL & SURGICAL HISTORY:  CAD (coronary artery disease)  s/p cardiac cath 4/2018 @ [facility] (pLAD 20% ISR, oD2 60%, dOM3 small with 99% ISR but supplied by collaterals from [facility], mRCA 30% with plan for aggressive medical management)    Diabetes type 2, uncontrolled  on Levemir 52U, Novolog sliding scale    Hyperlipemia  Rosuvastatin and Zetia    Hypertension    MI (myocardial infarction)    Hypertension    H/O osteomyelitis  R 1st &amp; 2nd toe with gangrene 4/2018 s/p toenail removal &amp; abx course    Chronic systolic HF (heart failure)  EF 40-45% TTE 4/2018    AF (atrial fibrillation)  on Eliqus 5mg BID    S/P CABG x 3    S/P coronary artery stent placement    H/O: hysterectomy    Stented coronary artery    S/P hysterectomy        FAMILY HISTORY:  Family history of pacemaker  Brother        Social History:    Outpatient Medications:    MEDICATIONS  (STANDING):  apixaban 5 milliGRAM(s) Oral two times a day  aspirin enteric coated 81 milliGRAM(s) Oral daily  atorvastatin 80 milliGRAM(s) Oral at bedtime  cilostazol 50 milliGRAM(s) Oral two times a day  dextrose 40% Gel 15 Gram(s) Oral once  dextrose 50% Injectable 25 Gram(s) IV Push once  dextrose 50% Injectable 12.5 Gram(s) IV Push once  dextrose 50% Injectable 25 Gram(s) IV Push once  furosemide   Injectable 40 milliGRAM(s) IV Push daily  gabapentin 300 milliGRAM(s) Oral three times a day  glucagon  Injectable 1 milliGRAM(s) IntraMuscular once  insulin glargine Injectable (LANTUS) 41 Unit(s) SubCutaneous at bedtime  insulin lispro (ADMELOG) corrective regimen sliding scale   SubCutaneous three times a day before meals  insulin lispro (ADMELOG) corrective regimen sliding scale   SubCutaneous at bedtime  metoprolol succinate ER 25 milliGRAM(s) Oral daily  montelukast 10 milliGRAM(s) Oral daily    MEDICATIONS  (PRN):      Allergies    No Known Allergies    Intolerances      Review of Systems:  Constitutional: No fever  Eyes: No blurry vision  Neuro: No tremors  HEENT: No pain  Cardiovascular: No chest pain, palpitations  Respiratory: No SOB, no cough  GI: No nausea, vomiting, abdominal pain  : No dysuria  Skin: no rash  Psych: no depression  Endocrine: no polyuria, polydipsia  Hem/lymph: no swelling  Osteoporosis: no fractures    ALL OTHER SYSTEMS REVIEWED AND NEGATIVE    UNABLE TO OBTAIN    PHYSICAL EXAM:  VITALS: T(C): 36.8 (04-21-21 @ 09:01)  T(F): 98.2 (04-21-21 @ 09:01), Max: 98.3 (04-20-21 @ 21:49)  HR: 81 (04-21-21 @ 09:01) (71 - 89)  BP: 137/59 (04-21-21 @ 09:01) (120/72 - 158/85)  RR:  (16 - 20)  SpO2:  (98% - 100%)  Wt(kg): --  GENERAL: NAD, well-groomed, well-developed  EYES: No proptosis, no lid lag, anicteric  HEENT:  Atraumatic, Normocephalic, moist mucous membranes  THYROID: Normal size, no palpable nodules  RESPIRATORY: Clear to auscultation bilaterally; No rales, rhonchi, wheezing  CARDIOVASCULAR: Regular rate and rhythm; No murmurs; no peripheral edema  GI: Soft, nontender, non distended, normal bowel sounds  SKIN: Dry, intact, No rashes or lesions  MUSCULOSKELETAL: Full range of motion, normal strength  NEURO: sensation intact, extraocular movements intact, no tremor  PSYCH: Alert and oriented x 3, normal affect, normal mood  CUSHING'S SIGNS: no striae      CAPILLARY BLOOD GLUCOSE      POCT Blood Glucose.: 233 mg/dL (21 Apr 2021 12:57)  POCT Blood Glucose.: 133 mg/dL (21 Apr 2021 08:56)  POCT Blood Glucose.: 199 mg/dL (21 Apr 2021 03:19)  POCT Blood Glucose.: 88 mg/dL (21 Apr 2021 00:44)  POCT Blood Glucose.: 137 mg/dL (20 Apr 2021 21:34)  POCT Blood Glucose.: 182 mg/dL (20 Apr 2021 15:25)                            12.8   4.86  )-----------( 244      ( 21 Apr 2021 06:23 )             40.7       04-21    135  |  100  |  24<H>  ----------------------------<  215<H>  4.7   |  26  |  1.34<H>    EGFR if : 43<L>  EGFR if non : 37<L>    Ca    9.5      04-21  Mg     2.0     04-21  Phos  3.3     04-21    TPro  7.2  /  Alb  3.3  /  TBili  0.5  /  DBili  x   /  AST  19  /  ALT  20  /  AlkPhos  96  04-20      Thyroid Function Tests:  04-21 @ 06:23 TSH 1.36 FreeT4 -- T3 -- Anti TPO -- Anti Thyroglobulin Ab -- TSI --      A1C with Estimated Average Glucose Result: 9.2 % (04-21-21 @ 06:23)  A1C with Estimated Average Glucose Result: 8.8 % (07-29-20 @ 11:45)      04-21 Chol 134 LDL -- HDL 52 Trig 102    Radiology:              NOTE INCOMPLETE/ IN PROGRESS  *Please wait for attending attestation for official recommendations.     HPI:  82 y/o F w PMHx of HTN, HLD, AFib on Eliquis, IDDM w/ neuropathy, sHFrEF (EF 40-45% TTE 4/2018), CAD s/p cardiac cath 4/2018 @ [facility] (pLAD 20% ISR, oD2 60%, dOM3 small with 99% ISR but supplied by collaterals from [facility], mRCA 30% with plan for aggressive medical management), osteomyelitis of R 1st & 2nd toe with gangrene 4/2018 s/p toenail removal & abx course presents to the hospital w/ complaints of bilateral LE edema x3 days. Additional history obtained from son, Arturo () and daughter-in-law,  (). Patient states she was in [place] visiting her son and returned 1 week ago. While there, she had a mechanical fall but did not sustain a head injury or LOC. Denies precipitating symptoms (i.e. chest pain, palpitations, shortness of breath, dizziness, or headache). Reports dropping her cane on her feet; had mild pain but was able to ambulate w/ her cane at baseline. Since returning, she has noticed increased b/l LE edema (R>L) associated w/ mild pain. Denies fevers, chills, malaise, myalgias, anorexia, weight changes (loss or gain), night sweats, generalized fatigue, lightheadedness, dizziness, syncope, headache, changes in vision, chest pain, palpitations, diaphoresis, SOB, MORAES, PND, orthopnea, cough, wheezing, abdominal pain, N/V/C/D, BRBPR, melena, urinary incontinence/frequency, dysuria, hematuria, numbness/weakness/tingling, suicidal or homicidal ideation, or any other complaints.    ED Course: Temp 97.8-98.3F, HR 72-89, //72, SpO2 % on RA, RR 16-20; s/p Tylenol 975mg PO x1, Lasix 40mg IVP x1 (21 Apr 2021 00:08)        PAST MEDICAL & SURGICAL HISTORY:  CAD (coronary artery disease)  s/p cardiac cath 4/2018 @ [facility] (pLAD 20% ISR, oD2 60%, dOM3 small with 99% ISR but supplied by collaterals from [facility], mRCA 30% with plan for aggressive medical management)    Diabetes type 2, uncontrolled  on Levemir 52U, Novolog sliding scale    Hyperlipemia  Rosuvastatin and Zetia    Hypertension    MI (myocardial infarction)    Hypertension    H/O osteomyelitis  R 1st &amp; 2nd toe with gangrene 4/2018 s/p toenail removal &amp; abx course    Chronic systolic HF (heart failure)  EF 40-45% TTE 4/2018    AF (atrial fibrillation)  on Eliqus 5mg BID    S/P CABG x 3    S/P coronary artery stent placement    H/O: hysterectomy    Stented coronary artery    S/P hysterectomy        FAMILY HISTORY:  Family history of pacemaker  Brother        Social History:    Outpatient Medications:    MEDICATIONS  (STANDING):  apixaban 5 milliGRAM(s) Oral two times a day  aspirin enteric coated 81 milliGRAM(s) Oral daily  atorvastatin 80 milliGRAM(s) Oral at bedtime  cilostazol 50 milliGRAM(s) Oral two times a day  dextrose 40% Gel 15 Gram(s) Oral once  dextrose 50% Injectable 25 Gram(s) IV Push once  dextrose 50% Injectable 12.5 Gram(s) IV Push once  dextrose 50% Injectable 25 Gram(s) IV Push once  furosemide   Injectable 40 milliGRAM(s) IV Push daily  gabapentin 300 milliGRAM(s) Oral three times a day  glucagon  Injectable 1 milliGRAM(s) IntraMuscular once  insulin glargine Injectable (LANTUS) 41 Unit(s) SubCutaneous at bedtime  insulin lispro (ADMELOG) corrective regimen sliding scale   SubCutaneous three times a day before meals  insulin lispro (ADMELOG) corrective regimen sliding scale   SubCutaneous at bedtime  metoprolol succinate ER 25 milliGRAM(s) Oral daily  montelukast 10 milliGRAM(s) Oral daily    MEDICATIONS  (PRN):      Allergies    No Known Allergies    Intolerances      Review of Systems:  Constitutional: No fever  Eyes: No blurry vision  Neuro: No tremors  HEENT: No pain  Cardiovascular: No chest pain, palpitations  Respiratory: No SOB, no cough  GI: No nausea, vomiting, abdominal pain  : No dysuria  Skin: no rash  Psych: no depression  Endocrine: no polyuria, polydipsia  Hem/lymph: no swelling  Osteoporosis: no fractures    ALL OTHER SYSTEMS REVIEWED AND NEGATIVE    UNABLE TO OBTAIN    PHYSICAL EXAM:  VITALS: T(C): 36.8 (04-21-21 @ 09:01)  T(F): 98.2 (04-21-21 @ 09:01), Max: 98.3 (04-20-21 @ 21:49)  HR: 81 (04-21-21 @ 09:01) (71 - 89)  BP: 137/59 (04-21-21 @ 09:01) (120/72 - 158/85)  RR:  (16 - 20)  SpO2:  (98% - 100%)  Wt(kg): --  GENERAL: NAD, well-groomed, well-developed  EYES: No proptosis, no lid lag, anicteric  HEENT:  Atraumatic, Normocephalic, moist mucous membranes  THYROID: Normal size, no palpable nodules  RESPIRATORY: Clear to auscultation bilaterally; No rales, rhonchi, wheezing  CARDIOVASCULAR: Regular rate and rhythm; No murmurs; no peripheral edema  GI: Soft, nontender, non distended, normal bowel sounds  SKIN: Dry, intact, No rashes or lesions  MUSCULOSKELETAL: Full range of motion, normal strength  NEURO: sensation intact, extraocular movements intact, no tremor  PSYCH: Alert and oriented x 3, normal affect, normal mood  CUSHING'S SIGNS: no striae      CAPILLARY BLOOD GLUCOSE      POCT Blood Glucose.: 233 mg/dL (21 Apr 2021 12:57)  POCT Blood Glucose.: 133 mg/dL (21 Apr 2021 08:56)  POCT Blood Glucose.: 199 mg/dL (21 Apr 2021 03:19)  POCT Blood Glucose.: 88 mg/dL (21 Apr 2021 00:44)  POCT Blood Glucose.: 137 mg/dL (20 Apr 2021 21:34)  POCT Blood Glucose.: 182 mg/dL (20 Apr 2021 15:25)                            12.8   4.86  )-----------( 244      ( 21 Apr 2021 06:23 )             40.7       04-21    135  |  100  |  24<H>  ----------------------------<  215<H>  4.7   |  26  |  1.34<H>    EGFR if : 43<L>  EGFR if non : 37<L>    Ca    9.5      04-21  Mg     2.0     04-21  Phos  3.3     04-21    TPro  7.2  /  Alb  3.3  /  TBili  0.5  /  DBili  x   /  AST  19  /  ALT  20  /  AlkPhos  96  04-20      Thyroid Function Tests:  04-21 @ 06:23 TSH 1.36 FreeT4 -- T3 -- Anti TPO -- Anti Thyroglobulin Ab -- TSI --      A1C with Estimated Average Glucose Result: 9.2 % (04-21-21 @ 06:23)  A1C with Estimated Average Glucose Result: 8.8 % (07-29-20 @ 11:45)      04-21 Chol 134 LDL -- HDL 52 Trig 102    Radiology:              NOTE INCOMPLETE/ IN PROGRESS  *Please wait for attending attestation for official recommendations.     HPI:  80 y/o F w PMHx of HTN, HLD, AFib on Eliquis, IDDM w/ neuropathy, sHFrEF (EF 40-45% TTE 4/2018), CAD s/p cardiac cath 4/2018 @ [facility] (pLAD 20% ISR, oD2 60%, dOM3 small with 99% ISR but supplied by collaterals from [facility], mRCA 30% with plan for aggressive medical management), osteomyelitis of R 1st & 2nd toe with gangrene 4/2018 s/p toenail removal & abx course presents to the hospital w/ complaints of bilateral LE edema x3 days. Additional history obtained from son, Arturo () and daughter-in-law,  (). Patient states she was in [place] visiting her son and returned 1 week ago. While there, she had a mechanical fall but did not sustain a head injury or LOC. Denies precipitating symptoms (i.e. chest pain, palpitations, shortness of breath, dizziness, or headache). Reports dropping her cane on her feet; had mild pain but was able to ambulate w/ her cane at baseline. Since returning, she has noticed increased b/l LE edema (R>L) associated w/ mild pain. Denies fevers, chills, malaise, myalgias, anorexia, weight changes (loss or gain), night sweats, generalized fatigue, lightheadedness, dizziness, syncope, headache, changes in vision, chest pain, palpitations, diaphoresis, SOB, MORAES, PND, orthopnea, cough, wheezing, abdominal pain, N/V/C/D, BRBPR, melena, urinary incontinence/frequency, dysuria, hematuria, numbness/weakness/tingling, suicidal or homicidal ideation, or any other complaints.    ED Course: Temp 97.8-98.3F, HR 72-89, //72, SpO2 % on RA, RR 16-20; s/p Tylenol 975mg PO x1, Lasix 40mg IVP x1 (21 Apr 2021 00:08)        PAST MEDICAL & SURGICAL HISTORY:  CAD (coronary artery disease)  s/p cardiac cath 4/2018 @ [facility] (pLAD 20% ISR, oD2 60%, dOM3 small with 99% ISR but supplied by collaterals from [facility], mRCA 30% with plan for aggressive medical management)    Diabetes type 2, uncontrolled  on Levemir 52U, Novolog sliding scale  Hyperlipemia  Rosuvastatin and Zetia  Hypertension  MI (myocardial infarction)  Hypertension  H/O osteomyelitis  R 1st &amp; 2nd toe with gangrene 4/2018 s/p toenail removal &amp; abx course  Chronic systolic HF (heart failure)  EF 40-45% TTE 4/2018  AF (atrial fibrillation)  on Eliqus 5mg BID  S/P CABG x 3  S/P coronary artery stent placement  H/O: hysterectomy  Stented coronary artery  S/P hysterectomy      FAMILY HISTORY:  Family history of pacemaker  Brother      Social History:    Outpatient Medications:  aspirin 81 mg oral tablet: 1 tab(s) orally once a day (20 Apr 2021 23:57)  cilostazol 50 mg oral tablet: 1 tab(s) orally 2 times a day (20 Apr 2021 23:57)  metoprolol succinate 25 mg oral tablet, extended release: 1 tab(s) orally once a day (20 Apr 2021 23:57)  montelukast 10 mg oral tablet: 1 tab(s) orally once a day (20 Apr 2021 23:57)  NovoLOG 100 units/mL subcutaneous solution: -200 -- 2U  FS &gt;200 -- 4U (20 Apr 2021 23:57)  rosuvastatin 20 mg oral tablet: 1 tab(s) orally once a day (20 Apr 2021 23:57)  Tresiba 100 units/mL subcutaneous solution: 52 unit(s) subcutaneous once a day (at bedtime) (20 Apr 2021 23:57)  Zetia 10 mg oral tablet: 1 tab(s) orally once a day (20 Apr 2021 23:57)      MEDICATIONS  (STANDING):  apixaban 5 milliGRAM(s) Oral two times a day  aspirin enteric coated 81 milliGRAM(s) Oral daily  atorvastatin 80 milliGRAM(s) Oral at bedtime  cilostazol 50 milliGRAM(s) Oral two times a day  dextrose 40% Gel 15 Gram(s) Oral once  dextrose 50% Injectable 25 Gram(s) IV Push once  dextrose 50% Injectable 12.5 Gram(s) IV Push once  dextrose 50% Injectable 25 Gram(s) IV Push once  furosemide   Injectable 40 milliGRAM(s) IV Push daily  gabapentin 300 milliGRAM(s) Oral three times a day  glucagon  Injectable 1 milliGRAM(s) IntraMuscular once  insulin glargine Injectable (LANTUS) 41 Unit(s) SubCutaneous at bedtime  insulin lispro (ADMELOG) corrective regimen sliding scale   SubCutaneous three times a day before meals  insulin lispro (ADMELOG) corrective regimen sliding scale   SubCutaneous at bedtime  metoprolol succinate ER 25 milliGRAM(s) Oral daily  montelukast 10 milliGRAM(s) Oral daily      Allergies  No Known Allergies      Review of Systems:  Constitutional: No fever  Eyes: No blurry vision  Neuro: No tremors  HEENT: No pain  Cardiovascular: No chest pain, palpitations  Respiratory: No SOB, no cough  GI: No nausea, vomiting, abdominal pain  : No dysuria  Skin: no rash  Psych: no depression  Endocrine: no polyuria, polydipsia  Hem/lymph: b/l LE swelling   Osteoporosis: no fractures    ALL OTHER SYSTEMS REVIEWED AND NEGATIVE    UNABLE TO OBTAIN    PHYSICAL EXAM:  VITALS: T(C): 36.8 (04-21-21 @ 09:01)  T(F): 98.2 (04-21-21 @ 09:01), Max: 98.3 (04-20-21 @ 21:49)  HR: 81 (04-21-21 @ 09:01) (71 - 89)  BP: 137/59 (04-21-21 @ 09:01) (120/72 - 158/85)  RR:  (16 - 20)  SpO2:  (98% - 100%)  Wt(kg): --  PHYSICAL EXAM:  General: Awake and alert.  No acute distress.  Head: Normocephalic, atraumatic.    Eyes: PERRL.  EOMI.  No scleral icterus.  No conjunctival pallor.  Mouth: Moist MM.  No oropharyngeal exudates.    Neck: Supple.  Full range of motion.  No JVD.  No LAD.  No thyromegaly.  Trachea midline.    Heart: Irregularly regular.  Normal S1 and S2.  No murmurs, rubs, or gallops. 2+ pitting edema LLE; 3+ pitting edema RLE  Lungs: Nonlabored breathing.  Good inspiratory effort.  CTAB.  No wheezes, crackles, or rhonchi.    Abdomen: BS+, soft, NT/ND.  No hepatomegaly.   Skin: (+) Abrasions to b/l anterior shins; Warm and dry.  No rashes.  Extremities: (+) TTP RLE. No cyanosis.  2+ peripheral pulses b/l.  Musculoskeletal: No joint deformities.  No spinal or paraspinal tenderness.  Neuro: A&Ox3.  CN II-XII intact.  5/5 motor strength in UE and LE b/l.  Tactile sensation intact in UE and LE b/l.      CAPILLARY BLOOD GLUCOSE      POCT Blood Glucose.: 233 mg/dL (21 Apr 2021 12:57)  POCT Blood Glucose.: 133 mg/dL (21 Apr 2021 08:56)  POCT Blood Glucose.: 199 mg/dL (21 Apr 2021 03:19)  POCT Blood Glucose.: 88 mg/dL (21 Apr 2021 00:44)  POCT Blood Glucose.: 137 mg/dL (20 Apr 2021 21:34)  POCT Blood Glucose.: 182 mg/dL (20 Apr 2021 15:25)                            12.8   4.86  )-----------( 244      ( 21 Apr 2021 06:23 )             40.7       04-21    135  |  100  |  24<H>  ----------------------------<  215<H>  4.7   |  26  |  1.34<H>    EGFR if : 43<L>  EGFR if non : 37<L>    Ca    9.5      04-21  Mg     2.0     04-21  Phos  3.3     04-21    TPro  7.2  /  Alb  3.3  /  TBili  0.5  /  DBili  x   /  AST  19  /  ALT  20  /  AlkPhos  96  04-20      Thyroid Function Tests:  04-21 @ 06:23 TSH 1.36 FreeT4 -- T3 -- Anti TPO -- Anti Thyroglobulin Ab -- TSI --      A1C with Estimated Average Glucose Result: 9.2 % (04-21-21 @ 06:23)  A1C with Estimated Average Glucose Result: 8.8 % (07-29-20 @ 11:45)      04-21 Chol 134 LDL -- HDL 52 Trig 102    Radiology:              NOTE INCOMPLETE/ IN PROGRESS  *Please wait for attending attestation for official recommendations.     HPI:  80 y/o F w PMHx of HTN, HLD, AFib on Eliquis, IDDM w/ neuropathy, sHFrEF (EF 40-45% TTE 4/2018), CAD s/p cardiac cath 4/2018 @ [facility] (pLAD 20% ISR, oD2 60%, dOM3 small with 99% ISR but supplied by collaterals from [facility], mRCA 30% with plan for aggressive medical management), osteomyelitis of R 1st & 2nd toe with gangrene 4/2018 s/p toenail removal & abx course presents to the hospital w/ complaints of bilateral LE edema x3 days. Additional history obtained from son, Arturo () and daughter-in-law,  (). Patient states she was in [place] visiting her son and returned 1 week ago. While there, she had a mechanical fall but did not sustain a head injury or LOC. Denies precipitating symptoms (i.e. chest pain, palpitations, shortness of breath, dizziness, or headache). Reports dropping her cane on her feet; had mild pain but was able to ambulate w/ her cane at baseline. Since returning, she has noticed increased b/l LE edema (R>L) associated w/ mild pain. Denies fevers, chills, malaise, myalgias, anorexia, weight changes (loss or gain), night sweats, generalized fatigue, lightheadedness, dizziness, syncope, headache, changes in vision, chest pain, palpitations, diaphoresis, SOB, MORAES, PND, orthopnea, cough, wheezing, abdominal pain, N/V/C/D, BRBPR, melena, urinary incontinence/frequency, dysuria, hematuria, numbness/weakness/tingling, suicidal or homicidal ideation, or any other complaints.    ED Course: Temp 97.8-98.3F, HR 72-89, //72, SpO2 % on RA, RR 16-20; s/p Tylenol 975mg PO x1, Lasix 40mg IVP x1 (21 Apr 2021 00:08)    Endocrine was consulted given that pt's HgA1c on admission was 9.2%. According to pt, she was diagnosed w/ T2DM in her 40s-50s. She follows w/ .     PAST MEDICAL & SURGICAL HISTORY:  CAD (coronary artery disease)  s/p cardiac cath 4/2018 @ [facility] (pLAD 20% ISR, oD2 60%, dOM3 small with 99% ISR but supplied by collaterals from [facility], mRCA 30% with plan for aggressive medical management)    Diabetes type 2, uncontrolled  on Levemir 52U, Novolog sliding scale  Hyperlipemia  Rosuvastatin and Zetia  Hypertension  MI (myocardial infarction)  Hypertension  H/O osteomyelitis  R 1st &amp; 2nd toe with gangrene 4/2018 s/p toenail removal &amp; abx course  Chronic systolic HF (heart failure)  EF 40-45% TTE 4/2018  AF (atrial fibrillation)  on Eliqus 5mg BID  S/P CABG x 3  S/P coronary artery stent placement  H/O: hysterectomy  Stented coronary artery  S/P hysterectomy      FAMILY HISTORY:  Family history of pacemaker  Brother      Social History:    Outpatient Medications:  aspirin 81 mg oral tablet: 1 tab(s) orally once a day (20 Apr 2021 23:57)  cilostazol 50 mg oral tablet: 1 tab(s) orally 2 times a day (20 Apr 2021 23:57)  metoprolol succinate 25 mg oral tablet, extended release: 1 tab(s) orally once a day (20 Apr 2021 23:57)  montelukast 10 mg oral tablet: 1 tab(s) orally once a day (20 Apr 2021 23:57)  NovoLOG 100 units/mL subcutaneous solution: -200 -- 2U  FS &gt;200 -- 4U (20 Apr 2021 23:57)  rosuvastatin 20 mg oral tablet: 1 tab(s) orally once a day (20 Apr 2021 23:57)  Tresiba 100 units/mL subcutaneous solution: 52 unit(s) subcutaneous once a day (at bedtime) (20 Apr 2021 23:57)  Zetia 10 mg oral tablet: 1 tab(s) orally once a day (20 Apr 2021 23:57)      MEDICATIONS  (STANDING):  apixaban 5 milliGRAM(s) Oral two times a day  aspirin enteric coated 81 milliGRAM(s) Oral daily  atorvastatin 80 milliGRAM(s) Oral at bedtime  cilostazol 50 milliGRAM(s) Oral two times a day  dextrose 40% Gel 15 Gram(s) Oral once  dextrose 50% Injectable 25 Gram(s) IV Push once  dextrose 50% Injectable 12.5 Gram(s) IV Push once  dextrose 50% Injectable 25 Gram(s) IV Push once  furosemide   Injectable 40 milliGRAM(s) IV Push daily  gabapentin 300 milliGRAM(s) Oral three times a day  glucagon  Injectable 1 milliGRAM(s) IntraMuscular once  insulin glargine Injectable (LANTUS) 41 Unit(s) SubCutaneous at bedtime  insulin lispro (ADMELOG) corrective regimen sliding scale   SubCutaneous three times a day before meals  insulin lispro (ADMELOG) corrective regimen sliding scale   SubCutaneous at bedtime  metoprolol succinate ER 25 milliGRAM(s) Oral daily  montelukast 10 milliGRAM(s) Oral daily      Allergies  No Known Allergies      Review of Systems:  Constitutional: No fever  Eyes: No blurry vision  Neuro: No tremors  HEENT: No pain  Cardiovascular: No chest pain, palpitations  Respiratory: No SOB, no cough  GI: No nausea, vomiting, abdominal pain  : No dysuria  Skin: no rash  Psych: no depression  Endocrine: no polyuria, polydipsia  Hem/lymph: b/l LE swelling   Osteoporosis: no fractures    ALL OTHER SYSTEMS REVIEWED AND NEGATIVE    UNABLE TO OBTAIN    PHYSICAL EXAM:  VITALS: T(C): 36.8 (04-21-21 @ 09:01)  T(F): 98.2 (04-21-21 @ 09:01), Max: 98.3 (04-20-21 @ 21:49)  HR: 81 (04-21-21 @ 09:01) (71 - 89)  BP: 137/59 (04-21-21 @ 09:01) (120/72 - 158/85)  RR:  (16 - 20)  SpO2:  (98% - 100%)  Wt(kg): --  PHYSICAL EXAM:  General: Awake and alert.  No acute distress.  Head: Normocephalic, atraumatic.    Eyes: PERRL.  EOMI.  No scleral icterus.  No conjunctival pallor.  Mouth: Moist MM.  No oropharyngeal exudates.    Neck: Supple.  Full range of motion.  No JVD.  No LAD.  No thyromegaly.  Trachea midline.    Heart: Irregularly regular.  Normal S1 and S2.  No murmurs, rubs, or gallops. 2+ pitting edema LLE; 3+ pitting edema RLE  Lungs: Nonlabored breathing.  Good inspiratory effort.  CTAB.  No wheezes, crackles, or rhonchi.    Abdomen: BS+, soft, NT/ND.  No hepatomegaly.   Skin: (+) Abrasions to b/l anterior shins; Warm and dry.  No rashes.  Extremities: (+) TTP RLE. No cyanosis.  2+ peripheral pulses b/l.  Musculoskeletal: No joint deformities.  No spinal or paraspinal tenderness.  Neuro: A&Ox3.  CN II-XII intact.  5/5 motor strength in UE and LE b/l.  Tactile sensation intact in UE and LE b/l.      CAPILLARY BLOOD GLUCOSE      POCT Blood Glucose.: 233 mg/dL (21 Apr 2021 12:57)  POCT Blood Glucose.: 133 mg/dL (21 Apr 2021 08:56)  POCT Blood Glucose.: 199 mg/dL (21 Apr 2021 03:19)  POCT Blood Glucose.: 88 mg/dL (21 Apr 2021 00:44)  POCT Blood Glucose.: 137 mg/dL (20 Apr 2021 21:34)  POCT Blood Glucose.: 182 mg/dL (20 Apr 2021 15:25)                            12.8   4.86  )-----------( 244      ( 21 Apr 2021 06:23 )             40.7       04-21    135  |  100  |  24<H>  ----------------------------<  215<H>  4.7   |  26  |  1.34<H>    EGFR if : 43<L>  EGFR if non : 37<L>    Ca    9.5      04-21  Mg     2.0     04-21  Phos  3.3     04-21    TPro  7.2  /  Alb  3.3  /  TBili  0.5  /  DBili  x   /  AST  19  /  ALT  20  /  AlkPhos  96  04-20      Thyroid Function Tests:  04-21 @ 06:23 TSH 1.36 FreeT4 -- T3 -- Anti TPO -- Anti Thyroglobulin Ab -- TSI --      A1C with Estimated Average Glucose Result: 9.2 % (04-21-21 @ 06:23)  A1C with Estimated Average Glucose Result: 8.8 % (07-29-20 @ 11:45)      04-21 Chol 134 LDL -- HDL 52 Trig 102    Radiology:              *Please wait for attending attestation for official recommendations.     HPI:  82 y/o F w PMHx of HTN, HLD, AFib on Eliquis, IDDM w/ neuropathy, sHFrEF (EF 40-45% TTE 4/2018), CAD s/p cardiac cath 4/2018 @ [facility] (pLAD 20% ISR, oD2 60%, dOM3 small with 99% ISR but supplied by collaterals from [facility], mRCA 30% with plan for aggressive medical management), osteomyelitis of R 1st & 2nd toe with gangrene 4/2018 s/p toenail removal & abx course presents to the hospital w/ complaints of bilateral LE edema x3 days. Additional history obtained from son, Arturo () and daughter-in-law,  (). Patient states she was in [place] visiting her son and returned 1 week ago. While there, she had a mechanical fall but did not sustain a head injury or LOC. Denies precipitating symptoms (i.e. chest pain, palpitations, shortness of breath, dizziness, or headache). Reports dropping her cane on her feet; had mild pain but was able to ambulate w/ her cane at baseline. Since returning, she has noticed increased b/l LE edema (R>L) associated w/ mild pain. Denies fevers, chills, malaise, myalgias, anorexia, weight changes (loss or gain), night sweats, generalized fatigue, lightheadedness, dizziness, syncope, headache, changes in vision, chest pain, palpitations, diaphoresis, SOB, MORAES, PND, orthopnea, cough, wheezing, abdominal pain, N/V/C/D, BRBPR, melena, urinary incontinence/frequency, dysuria, hematuria, numbness/weakness/tingling, suicidal or homicidal ideation, or any other complaints.    ED Course: Temp 97.8-98.3F, HR 72-89, //72, SpO2 % on RA, RR 16-20; s/p Tylenol 975mg PO x1, Lasix 40mg IVP x1 (21 Apr 2021 00:08)    Endocrine was consulted given that pt's HgA1c on admission was 9.2%. According to pt, she was diagnosed w/ T2DM in her 40s-50s. Pt's history is notable for complications of diabetes including CKD Stage III, OM, peripheral neuropathy, extensive cardiac complications including CAD and CHF, HTN and HLD. She follows w/ Dr. Raymundo. Pt states that her current home regimen is tresiba 52 units at bedtime as well as a novolog sliding scale. She is unclear of the exact sliding scale units, but believes that depending on her FGS, units can range from 2-4. Pt states that at home, she follows a good diet and has finger sticks that range from 100-200. Pt is also stating that she has never been hospitalized for diabetes, and denies any current polyuria, polydipsia, N/V/abdominal pain, changes in bowel habits, changes in weight or appetite, and no recent visual changes (wears glasses at baseline). She follows regularly w/ an ophthalmologist, and stated that she has never been told that she has diabetic retinopathy. She stated that in the past, she can tell that her FGS readings will be high when she starts experiencing polydipsia and polyuria.       PAST MEDICAL & SURGICAL HISTORY:  CAD (coronary artery disease)  s/p cardiac cath 4/2018 @ [facility] (pLAD 20% ISR, oD2 60%, dOM3 small with 99% ISR but supplied by collaterals from [facility], mRCA 30% with plan for aggressive medical management)    Diabetes type 2, uncontrolled  on Levemir 52U, Novolog sliding scale  Hyperlipemia  Rosuvastatin and Zetia  Hypertension  MI (myocardial infarction)  Hypertension  H/O osteomyelitis  R 1st &amp; 2nd toe with gangrene 4/2018 s/p toenail removal &amp; abx course  Chronic systolic HF (heart failure)  EF 40-45% TTE 4/2018  AF (atrial fibrillation)  on Eliqus 5mg BID  S/P CABG x 3  S/P coronary artery stent placement  H/O: hysterectomy  Stented coronary artery  S/P hysterectomy      FAMILY HISTORY:  Family history of pacemaker  Brother  Denies any known family history of T2DM    Social History:  Pt lives alone at home. 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 She has a HHA. Pt denies any toxic habits, including smoking, drinking, and ilicit drug use. States that she ambulates using a rolling walker.     Outpatient Medications:  aspirin 81 mg oral tablet: 1 tab(s) orally once a day (20 Apr 2021 23:57)  cilostazol 50 mg oral tablet: 1 tab(s) orally 2 times a day (20 Apr 2021 23:57)  metoprolol succinate 25 mg oral tablet, extended release: 1 tab(s) orally once a day (20 Apr 2021 23:57)  montelukast 10 mg oral tablet: 1 tab(s) orally once a day (20 Apr 2021 23:57)  NovoLOG 100 units/mL subcutaneous solution: -200 -- 2U  FS &gt;200 -- 4U (20 Apr 2021 23:57)  rosuvastatin 20 mg oral tablet: 1 tab(s) orally once a day (20 Apr 2021 23:57)  Tresiba 100 units/mL subcutaneous solution: 52 unit(s) subcutaneous once a day (at bedtime) (20 Apr 2021 23:57)  Zetia 10 mg oral tablet: 1 tab(s) orally once a day (20 Apr 2021 23:57)      MEDICATIONS  (STANDING):  apixaban 5 milliGRAM(s) Oral two times a day  aspirin enteric coated 81 milliGRAM(s) Oral daily  atorvastatin 80 milliGRAM(s) Oral at bedtime  cilostazol 50 milliGRAM(s) Oral two times a day  dextrose 40% Gel 15 Gram(s) Oral once  dextrose 50% Injectable 25 Gram(s) IV Push once  dextrose 50% Injectable 12.5 Gram(s) IV Push once  dextrose 50% Injectable 25 Gram(s) IV Push once  furosemide   Injectable 40 milliGRAM(s) IV Push daily  gabapentin 300 milliGRAM(s) Oral three times a day  glucagon  Injectable 1 milliGRAM(s) IntraMuscular once  insulin glargine Injectable (LANTUS) 41 Unit(s) SubCutaneous at bedtime  insulin lispro (ADMELOG) corrective regimen sliding scale   SubCutaneous three times a day before meals  insulin lispro (ADMELOG) corrective regimen sliding scale   SubCutaneous at bedtime  metoprolol succinate ER 25 milliGRAM(s) Oral daily  montelukast 10 milliGRAM(s) Oral daily      Allergies  No Known Allergies      Review of Systems:  Constitutional: No fever  Eyes: No blurry vision  Neuro: No tremors  HEENT: No pain  Cardiovascular: No chest pain, palpitations  Respiratory: No SOB, no cough  GI: No nausea, vomiting, abdominal pain  : No dysuria  Skin: no rash  Psych: no depression  Endocrine: no polyuria, polydipsia  Hem/lymph: (+) b/l LE swelling   Osteoporosis: no fractures    ALL OTHER SYSTEMS REVIEWED AND NEGATIVE    UNABLE TO OBTAIN    PHYSICAL EXAM:  VITALS: T(C): 36.8 (04-21-21 @ 09:01)  T(F): 98.2 (04-21-21 @ 09:01), Max: 98.3 (04-20-21 @ 21:49)  HR: 81 (04-21-21 @ 09:01) (71 - 89)  BP: 137/59 (04-21-21 @ 09:01) (120/72 - 158/85)  RR:  (16 - 20)  SpO2:  (98% - 100%)  Wt(kg): --  PHYSICAL EXAM:  General: Awake and alert.  No acute distress.  Head: Normocephalic, atraumatic.    Eyes: PERRL.  EOMI.  No scleral icterus.  No conjunctival pallor.  Mouth: Moist MM.  No oropharyngeal exudates.    Neck: Supple.  Full range of motion.  No JVD.  No LAD.  No thyromegaly.  Trachea midline.    Heart: Irregularly regular.  Normal S1 and S2.  No murmurs, rubs, or gallops. 2+ pitting edema LLE; 3+ pitting edema RLE  Lungs: Nonlabored breathing.  Good inspiratory effort.  CTAB.  No wheezes, crackles, or rhonchi.    Abdomen: BS+, soft, NT/ND.  No hepatomegaly.   Skin: (+) Abrasions to b/l anterior shins; Warm and dry.  No rashes.  Extremities: (+) TTP RLE. No cyanosis.  2+ peripheral pulses b/l.  Musculoskeletal: No joint deformities.  No spinal or paraspinal tenderness.  Neuro: A&Ox3.  CN II-XII intact.  5/5 motor strength in UE and LE b/l.  Tactile sensation intact in UE and LE b/l.      CAPILLARY BLOOD GLUCOSE      POCT Blood Glucose.: 233 mg/dL (21 Apr 2021 12:57)  POCT Blood Glucose.: 133 mg/dL (21 Apr 2021 08:56)  POCT Blood Glucose.: 199 mg/dL (21 Apr 2021 03:19)  POCT Blood Glucose.: 88 mg/dL (21 Apr 2021 00:44)  POCT Blood Glucose.: 137 mg/dL (20 Apr 2021 21:34)  POCT Blood Glucose.: 182 mg/dL (20 Apr 2021 15:25)                            12.8   4.86  )-----------( 244      ( 21 Apr 2021 06:23 )             40.7       04-21    135  |  100  |  24<H>  ----------------------------<  215<H>  4.7   |  26  |  1.34<H>    EGFR if : 43<L>  EGFR if non : 37<L>    Ca    9.5      04-21  Mg     2.0     04-21  Phos  3.3     04-21    TPro  7.2  /  Alb  3.3  /  TBili  0.5  /  DBili  x   /  AST  19  /  ALT  20  /  AlkPhos  96  04-20      Thyroid Function Tests:  04-21 @ 06:23 TSH 1.36 FreeT4 -- T3 -- Anti TPO -- Anti Thyroglobulin Ab -- TSI --      A1C with Estimated Average Glucose Result: 9.2 % (04-21-21 @ 06:23)  A1C with Estimated Average Glucose Result: 8.8 % (07-29-20 @ 11:45)      04-21 Chol 134 LDL 62 HDL 52 Trig 102     HPI:  80 y/o F w PMHx of HTN, HLD, AFib on Eliquis, IDDM w/ neuropathy, sHFrEF (EF 40-45% TTE 4/2018), CAD s/p cardiac cath 4/2018 @ [facility] (pLAD 20% ISR, oD2 60%, dOM3 small with 99% ISR but supplied by collaterals from [facility], mRCA 30% with plan for aggressive medical management), osteomyelitis of R 1st & 2nd toe with gangrene 4/2018 s/p toenail removal & abx course presents to the hospital w/ complaints of bilateral LE edema x3 days. Additional history obtained from son, Arturo () and daughter-in-law,  (). Patient states she was in [place] visiting her son and returned 1 week ago. While there, she had a mechanical fall but did not sustain a head injury or LOC. Denies precipitating symptoms (i.e. chest pain, palpitations, shortness of breath, dizziness, or headache). Reports dropping her cane on her feet; had mild pain but was able to ambulate w/ her cane at baseline. Since returning, she has noticed increased b/l LE edema (R>L) associated w/ mild pain. Denies fevers, chills, malaise, myalgias, anorexia, weight changes (loss or gain), night sweats, generalized fatigue, lightheadedness, dizziness, syncope, headache, changes in vision, chest pain, palpitations, diaphoresis, SOB, MORAES, PND, orthopnea, cough, wheezing, abdominal pain, N/V/C/D, BRBPR, melena, urinary incontinence/frequency, dysuria, hematuria, numbness/weakness/tingling, suicidal or homicidal ideation, or any other complaints.    ED Course: Temp 97.8-98.3F, HR 72-89, //72, SpO2 % on RA, RR 16-20; s/p Tylenol 975mg PO x1, Lasix 40mg IVP x1 (21 Apr 2021 00:08)    Endocrine was consulted given that pt's HgA1c on admission was 9.2%. According to pt, she was diagnosed w/ T2DM in her 40s-50s. Pt's history is notable for complications of diabetes including CKD Stage III, OM, peripheral neuropathy, extensive cardiac complications including CAD and CHF, HTN and HLD. She follows w/ Dr. Raymundo. Pt states that her current home regimen is tresiba 52 units at bedtime as well as a novolog sliding scale. She is unclear of the exact sliding scale units, but believes that depending on her FGS, units can range from 2-4. Pt states that at home, she follows a good diet and has finger sticks that range from 100-200. Pt is also stating that she has never been hospitalized for diabetes, and denies any current polyuria, polydipsia, N/V/abdominal pain, changes in bowel habits, changes in weight or appetite, and no recent visual changes (wears glasses at baseline). She follows regularly w/ an ophthalmologist, and stated that she has never been told that she has diabetic retinopathy. She stated that in the past, she can tell that her FGS readings will be high when she starts experiencing polydipsia and polyuria.       PAST MEDICAL & SURGICAL HISTORY:  CAD (coronary artery disease)  s/p cardiac cath 4/2018 @ [facility] (pLAD 20% ISR, oD2 60%, dOM3 small with 99% ISR but supplied by collaterals from [facility], mRCA 30% with plan for aggressive medical management)    Diabetes type 2, uncontrolled  on Levemir 52U, Novolog sliding scale  Hyperlipemia  Rosuvastatin and Zetia  Hypertension  MI (myocardial infarction)  Hypertension  H/O osteomyelitis  R 1st &amp; 2nd toe with gangrene 4/2018 s/p toenail removal &amp; abx course  Chronic systolic HF (heart failure)  EF 40-45% TTE 4/2018  AF (atrial fibrillation)  on Eliqus 5mg BID  S/P CABG x 3  S/P coronary artery stent placement  H/O: hysterectomy  Stented coronary artery  S/P hysterectomy      FAMILY HISTORY:  Family history of pacemaker  Brother  Denies any known family history of T2DM    Social History:  Pt lives alone at home. 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 She has a HHA. Pt denies any toxic habits, including smoking, drinking, and ilicit drug use. States that she ambulates using a rolling walker.     Outpatient Medications:  aspirin 81 mg oral tablet: 1 tab(s) orally once a day (20 Apr 2021 23:57)  cilostazol 50 mg oral tablet: 1 tab(s) orally 2 times a day (20 Apr 2021 23:57)  metoprolol succinate 25 mg oral tablet, extended release: 1 tab(s) orally once a day (20 Apr 2021 23:57)  montelukast 10 mg oral tablet: 1 tab(s) orally once a day (20 Apr 2021 23:57)  NovoLOG 100 units/mL subcutaneous solution: -200 -- 2U  FS &gt;200 -- 4U (20 Apr 2021 23:57)  rosuvastatin 20 mg oral tablet: 1 tab(s) orally once a day (20 Apr 2021 23:57)  Tresiba 100 units/mL subcutaneous solution: 52 unit(s) subcutaneous once a day (at bedtime) (20 Apr 2021 23:57)  Zetia 10 mg oral tablet: 1 tab(s) orally once a day (20 Apr 2021 23:57)      MEDICATIONS  (STANDING):  apixaban 5 milliGRAM(s) Oral two times a day  aspirin enteric coated 81 milliGRAM(s) Oral daily  atorvastatin 80 milliGRAM(s) Oral at bedtime  cilostazol 50 milliGRAM(s) Oral two times a day  dextrose 40% Gel 15 Gram(s) Oral once  dextrose 50% Injectable 25 Gram(s) IV Push once  dextrose 50% Injectable 12.5 Gram(s) IV Push once  dextrose 50% Injectable 25 Gram(s) IV Push once  furosemide   Injectable 40 milliGRAM(s) IV Push daily  gabapentin 300 milliGRAM(s) Oral three times a day  glucagon  Injectable 1 milliGRAM(s) IntraMuscular once  insulin glargine Injectable (LANTUS) 41 Unit(s) SubCutaneous at bedtime  insulin lispro (ADMELOG) corrective regimen sliding scale   SubCutaneous three times a day before meals  insulin lispro (ADMELOG) corrective regimen sliding scale   SubCutaneous at bedtime  metoprolol succinate ER 25 milliGRAM(s) Oral daily  montelukast 10 milliGRAM(s) Oral daily      Allergies  No Known Allergies      Review of Systems:  Constitutional: No fever  Eyes: No blurry vision  Neuro: No tremors  HEENT: No pain  Cardiovascular: No chest pain, palpitations  Respiratory: No SOB, no cough  GI: No nausea, vomiting, abdominal pain  : No dysuria  Skin: no rash  Psych: no depression  Endocrine: no polyuria, polydipsia  Hem/lymph: (+) b/l LE swelling   Osteoporosis: no fractures    ALL OTHER SYSTEMS REVIEWED AND NEGATIVE    UNABLE TO OBTAIN    PHYSICAL EXAM:  VITALS: T(C): 36.8 (04-21-21 @ 09:01)  T(F): 98.2 (04-21-21 @ 09:01), Max: 98.3 (04-20-21 @ 21:49)  HR: 81 (04-21-21 @ 09:01) (71 - 89)  BP: 137/59 (04-21-21 @ 09:01) (120/72 - 158/85)  RR:  (16 - 20)  SpO2:  (98% - 100%)  Wt(kg): --  PHYSICAL EXAM:  General: Awake and alert.  No acute distress.  Head: Normocephalic, atraumatic.    Eyes: PERRL.  EOMI.  No scleral icterus.  No conjunctival pallor.  Mouth: Moist MM.  No oropharyngeal exudates.    Neck: Supple.  Full range of motion.  No JVD.  No LAD.  No thyromegaly.  Trachea midline.    Heart: Irregularly regular.  Normal S1 and S2.  No murmurs, rubs, or gallops. 2+ pitting edema LLE; 3+ pitting edema RLE  Lungs: Nonlabored breathing.  Good inspiratory effort.  CTAB.  No wheezes, crackles, or rhonchi.    Abdomen: BS+, soft, NT/ND.  No hepatomegaly.   Skin: (+) Abrasions to b/l anterior shins; Warm and dry.  No rashes.  Extremities: (+) TTP RLE. No cyanosis.  2+ peripheral pulses b/l.  Musculoskeletal: No joint deformities.  No spinal or paraspinal tenderness.  Neuro: A&Ox3.  CN II-XII intact.  5/5 motor strength in UE and LE b/l.  Tactile sensation intact in UE and LE b/l.      CAPILLARY BLOOD GLUCOSE      POCT Blood Glucose.: 233 mg/dL (21 Apr 2021 12:57)  POCT Blood Glucose.: 133 mg/dL (21 Apr 2021 08:56)  POCT Blood Glucose.: 199 mg/dL (21 Apr 2021 03:19)  POCT Blood Glucose.: 88 mg/dL (21 Apr 2021 00:44)  POCT Blood Glucose.: 137 mg/dL (20 Apr 2021 21:34)  POCT Blood Glucose.: 182 mg/dL (20 Apr 2021 15:25)                            12.8   4.86  )-----------( 244      ( 21 Apr 2021 06:23 )             40.7       04-21    135  |  100  |  24<H>  ----------------------------<  215<H>  4.7   |  26  |  1.34<H>    EGFR if : 43<L>  EGFR if non : 37<L>    Ca    9.5      04-21  Mg     2.0     04-21  Phos  3.3     04-21    TPro  7.2  /  Alb  3.3  /  TBili  0.5  /  DBili  x   /  AST  19  /  ALT  20  /  AlkPhos  96  04-20      Thyroid Function Tests:  04-21 @ 06:23 TSH 1.36 FreeT4 -- T3 -- Anti TPO -- Anti Thyroglobulin Ab -- TSI --      A1C with Estimated Average Glucose Result: 9.2 % (04-21-21 @ 06:23)  A1C with Estimated Average Glucose Result: 8.8 % (07-29-20 @ 11:45)      04-21 Chol 134 LDL 62 HDL 52 Trig 102

## 2021-04-21 NOTE — PROGRESS NOTE ADULT - PROBLEM SELECTOR PLAN 1
Reports noted x 1 wk, recent fall with trauma to the RLE.  Denies CP or SOB. X-ray neg for fracture, soft tissue swelling.  LE US negative for DVT.  UA only trace protein.  - c/w lasix 40 IVP daily   - daily weight, strict I&O  - TTE pending   - c/w pain control  - elevate LE  - PT rec ISAAC Reports noted x 1 wk, recent fall with trauma to the RLE.  Denies CP or SOB. X-ray neg for fracture, soft tissue swelling.  LE US negative for DVT.  UA only trace protein.  Reports does not have chronic LE edema however reliability questionable.  2018 no significant PAD of RLE.   - c/w lasix 40 IVP daily to assist with edema  - daily weight, strict I&O  - TTE pending to eval changes in cardiac fxn  - c/w pain control with tylenol and elevate LE  - PT rec ISAAC, pt aware reports amenable

## 2021-04-21 NOTE — PROGRESS NOTE ADULT - PROBLEM SELECTOR PLAN 6
Cardiac cath 4/2018 (pLAD 20% ISR, oD2 60%, dOM3 small with 99% ISR but supplied by collaterals from [facility], mRCA 30% with plan for aggressive medical management  - c/w ASA, Cilostazol, statin Cardiac cath 4/2018 (pLAD 20% ISR, oD2 60%, dOM3 small with 99% ISR but supplied by collaterals from [facility], mRCA 30% with plan for aggressive medical management  - c/w asa, Cilostazol, statin

## 2021-04-21 NOTE — PROGRESS NOTE ADULT - PROBLEM SELECTOR PLAN 2
LE edema without other signs of volume overload. Denies orthopnea, MORAES, no pulmonary edema or JVD on exam. Pro-bnp not significantly elevated (can be falsely low in obesity).  TTE 4/2018: EF 40-45%, moderate segmental LV systolic dysfxn, hypokinetic inferior and inferolateral walls  - c/w lasix 40 IV daily and reassess LE edema  - TTE pending  - fluid restriction, strict I&Os, low sodium diet

## 2021-04-21 NOTE — PATIENT PROFILE ADULT - BILL PAYMENT
Discharge Summary       PATIENT ID: Franca Rae  MRN: [id number]   YOB: 1943    DATE OF ADMISSION: 3/2/2021  5:40 PM    DATE OF DISCHARGE:   PRIMARY CARE PROVIDER: Zachariah Neumann NP     ATTENDING PHYSICIAN: Charo Kennedy  DISCHARGING PROVIDER: Charo Kennedy      CONSULTATIONS: IP CONSULT TO ORTHOPEDIC SURGERY  IP CONSULT TO CARDIOLOGY    PROCEDURES/SURGERIES: Procedure(s):  Open Reduction Internal Fixation Right Proximal Humerus    ADMITTING DIAGNOSES:    Patient Active Problem List    Diagnosis Date Noted    Closed right hip fracture ([facility] [address] 03/02/2021    Hip fracture ([facility] [address] 03/02/2021       DISCHARGE DIAGNOSES / PLAN:      Right hip hemiarthroplasty  Open reduction and internal fixation of right humerus fracture  Right femur fracture  Left humerus fracture  GERD  Hyperlipidemia  History of CVA  Leukocytosis  Hypertension         DISCHARGE MEDICATIONS:  Current Discharge Medication List      START taking these medications    Details   aspirin 81 mg chewable tablet Take 1 Tab by mouth daily. Qty: 30 Tab, Refills: 0      celecoxib (CELEBREX) 200 mg capsule Take 1 Cap by mouth two (2) times a day for 90 days. Indications: acute pain following an operation  Qty: 60 Cap, Refills: 2      oxyCODONE IR (ROXICODONE) 5 mg immediate release tablet Take 1 Tab by mouth every four (4) hours as needed for Pain for up to 3 days. Max Daily Amount: 30 mg.  Qty: 10 Tab, Refills: 0    Associated Diagnoses: Other closed displaced fracture of proximal end of right humerus, initial encounter         CONTINUE these medications which have NOT CHANGED    Details   cranberry extract 450 mg tab tablet Take 450 mg by mouth. omeprazole (PRILOSEC) 40 mg capsule Take 40 mg by mouth daily. amLODIPine (NORVASC) 10 mg tablet Take  by mouth daily. oxybutynin (DITROPAN) 5 mg tablet Take 5 mg by mouth two (2) times a day. clopidogreL (Plavix) 75 mg tab Take  by mouth.       simvastatin (ZOCOR) 20 mg tablet Take  by mouth nightly. furosemide (LASIX) 40 mg tablet Take  by mouth daily. multivitamin (ONE A DAY) tablet Take 1 Tab by mouth daily. STOP taking these medications       meloxicam (MOBIC) 15 mg tablet Comments:   Reason for Stopping:                 NOTIFY YOUR PHYSICIAN FOR ANY OF THE FOLLOWING:   Fever over 101 degrees for 24 hours. Chest pain, shortness of breath, fever, chills, nausea, vomiting, diarrhea, change in mentation, falling, weakness, bleeding. Severe pain or pain not relieved by medications. Or, any other signs or symptoms that you may have questions about. DISPOSITION:  x  Home With:   OT  PT  HH  RN       Long term SNF/Inpatient Rehab    Independent/assisted living    Hospice    Other:       PATIENT CONDITION AT DISCHARGE: Stable      PHYSICAL EXAMINATION AT DISCHARGE:  General:          Alert, cooperative, no distress, appears stated age. HEENT:           Atraumatic, anicteric sclerae, pink conjunctivae                          No oral ulcers, mucosa moist, throat clear, dentition fair  Neck:               Supple, symmetrical  Lungs:             Clear to auscultation bilaterally. No Wheezing or Rhonchi. No rales. Chest wall:      No tenderness  No Accessory muscle use. Heart:              Regular  rhythm,  No  murmur   No edema  Abdomen:        Soft, non-tender. Not distended. Bowel sounds normal  Extremities:     No cyanosis. No clubbing,                            Skin turgor normal, Capillary refill normal  Skin:                Not pale. Not Jaundiced  No rashes   Psych:             Not anxious or agitated. Neurologic:      Alert, moves all extremities, answers questions appropriately and responds to commands     XR SHOULDER RT AP/LAT MIN 2 V   Final Result      XR FLUOROSCOPY UNDER 60 MINUTES   Final Result      XR PELV AP ONLY   Final Result      XR FEMUR RT 2 VS   Final Result   1. Acute subcapital femoral neck fracture on the right.    2. Old distal no femoral metadiaphysis fracture. 3. Right knee arthroplasty. XR CHEST PORT   Final Result   1. Underexpanded lungs with minimal left basilar atelectasis. 2. Hiatal hernia. 3. Fracture left proximal humerus which may be acute. XR HIP RT W OR WO PELV 2-3 VWS   Final Result   FINDINGS: IMPRESSION: AP pelvis and limited lateral versus frog-leg imaging of   the right hip. Right femoral neck fracture with superior migration of the long bone fragment. No dislocation. Incomplete imaging of  mid femoral diaphyseal deformity from remote fracture. Intramedullary femoral oh has been removed since 2018. XR SHOULDER RT AP/LAT MIN 2 V   Final Result   FINDINGS: IMPRESSION: 3 images of the right shoulder. Transverse fracture of the humerus neck with almost complete displacement of the   long bone fragment medially. The humerus head maintains gross alignment with the   bony glenoid. No results found for this or any previous visit (from the past 24 hour(s)).        HOSPITAL COURSE:    Patient is a 69y.o. year old female history of hypertension GERD hyperlipidemia history of CVA came to the ER after have a fall  Patient had a ground-level fall yesterday she lost her balance affording on a small rock denies any head injury came to the ER seen by the ER physician x-rays done shows left femur fracture and left humerus fracture and patient was recommend to be admitted for further evaluation treatment orthopedic was consulted patient have pain 8 out of 10 on morphine    Seen by the orthopedics and patient have right hip hemiarthroplasty and open reduction internal fixation of the right humerus fracture patient tolerated the procedure well this morning patient was alert awake denies any chest pain or shortness of breath nausea vomiting diarrhea no constipation  Seen by the orthopedic today and cleared for discharge      Patient today alert awake not in distress sitting on the side of the bed with the physical therapy    Patient need Covid screening before go to rehab results are pending      Signed:   Vincent Tyler MD  3/10/2021  8:35 AM

## 2021-04-21 NOTE — H&P ADULT - NSICDXPASTMEDICALHX_GEN_ALL_CORE_FT
PAST MEDICAL HISTORY:  AF (atrial fibrillation) on Eliqus 5mg BID    CAD (coronary artery disease) s/p cardiac cath 4/2018 @ [facility] (pLAD 20% ISR, oD2 60%, dOM3 small with 99% ISR but supplied by collaterals from [facility], mRCA 30% with plan for aggressive medical management)    Chronic systolic HF (heart failure) EF 40-45% TTE 4/2018    Diabetes type 2, uncontrolled on Levemir 52U, Novolog sliding scale    H/O osteomyelitis R 1st & 2nd toe with gangrene 4/2018 s/p toenail removal & abx course    Hyperlipemia Rosuvastatin and Zetia    Hypertension     Hypertension     MI (myocardial infarction)

## 2021-04-21 NOTE — H&P ADULT - HISTORY OF PRESENT ILLNESS
80 y/o F w PMHx of HTN, HLD, AFib on Eliquis, IDDM w/ neuropathy, sHFrEF (EF 40-45% TTE 4/2018), CAD s/p cardiac cath 4/2018 @ [facility] (pLAD 20% ISR, oD2 60%, dOM3 small with 99% ISR but supplied by collaterals from [facility], mRCA 30% with plan for aggressive medical management), osteomyelitis of R 1st & 2nd toe with gangrene 4/2018 s/p toenail removal & abx course presents to the hospital w/ complaints of bilateral LE edema x3 days. Additional history obtained from son, Arturo () and daughter-in-law,  (). Patient states she was in [place] visiting her son and returned 1 week ago. While there, she had a mechanical fall but did not sustain a head injury or LOC. Denies precipitating symptoms (i.e. chest pain, palpitations, shortness of breath, dizziness, or headache). Reports dropping her cane on her feet; had mild pain but was able to ambulate w/ her cane at baseline. Since returning, she has noticed increased b/l LE edema (R>L) associated w/ mild pain. Denies fevers, chills, malaise, myalgias, anorexia, weight changes (loss or gain), night sweats, generalized fatigue, lightheadedness, dizziness, syncope, headache, changes in vision, chest pain, palpitations, diaphoresis, SOB, MORAES, PND, orthopnea, cough, wheezing, abdominal pain, N/V/C/D, BRBPR, melena, urinary incontinence/frequency, dysuria, hematuria, numbness/weakness/tingling, suicidal or homicidal ideation, or any other complaints.    ED Course: Temp 97.8-98.3F, HR 72-89, //72, SpO2 % on RA, RR 16-20; s/p Tylenol 975mg PO x1, Lasix 40mg IVP x1

## 2021-04-21 NOTE — H&P ADULT - NSHPSOCIALHISTORY_GEN_ALL_CORE
Tobacco Usage:  (x) never smoked   ( ) former smoker  ( ) current smoker; Packs per day:   Alcohol Usage: (x) none  ( ) occasional ( ) 2-3 times a week ( ) daily; Last drink:   Recreational drugs (x) None  Lives on her own; has home health attendant  Ambulates on own w/ assistance of cane/rollator  Recent travel from [place] via car; denies recent sick contacts

## 2021-04-21 NOTE — H&P ADULT - PROBLEM SELECTOR PLAN 6
- DVT ppx: Eliquis 5mg BID  - Diet: DASH/TLC  - Fall precautions - s/p cardiac cath 4/2018 (pLAD 20% ISR, oD2 60%, dOM3 small with 99% ISR but supplied by collaterals from [facility], mRCA 30% with plan for aggressive medical management  - c/w ASA, Cilostazol, statin

## 2021-04-21 NOTE — PROGRESS NOTE ADULT - PROBLEM SELECTOR PLAN 5
At home on Tresiba 52U at bedtime w/ Novolog sliding scale; poor control with HbA1c of >9  - am BG at goal 131 on 41 units of lantus, will continue  - will likely add mealtime coverage f/u pre-dinner BG, c/w TAWANNA  - RD consult  - f/u endocrine recs   -c/w gabapentin for neuropathy At home on Tresiba 52U at bedtime w/ Novolog sliding scale; poor control with HbA1c of >9  - am BG at goal 131 on 41 units of lantus, will continue  - will likely add mealtime coverage f/u pre-dinner BG, c/w TAWANNA  - RD consult  - f/u endocrine recs   - c/w gabapentin for neuropathy

## 2021-04-21 NOTE — H&P ADULT - PROBLEM SELECTOR PLAN 1
- B/l LE edema likely 2/2 CHF exacerbation  - s/p Lasix 40mg IVP x1 w/ appropriate response; will c/w Lasix 40mg IVP daily  - B/l LE TTP on exam, will obtain duplex b/l LE to r/o DVT given recent travel  - TTE pending  - Fluid restriction, Strict Is and Os, low sodium diet - B/l LE edema likely 2/2 CHF exacerbation  - s/p Lasix 40mg IVP x1 w/ appropriate response; will c/w Lasix 40mg IVP daily  - B/l LE TTP on exam, duplex b/l LE negative for DVT  - TTE pending  - Fluid restriction, Strict Is and Os, low sodium diet - B/l LE edema likely 2/2 trauma but cannot r/o CHF exacerbation  - s/p Lasix 40mg IVP x1 w/ appropriate response; will c/w Lasix 40mg IVP daily  - B/l LE TTP on exam, duplex b/l LE negative for DVT  - XR negative for fracture or dislocation; probable soft tissue injury  - Pain control PRN - B/l LE edema likely 2/2 trauma but cannot r/o CHF exacerbation  - s/p Lasix 40mg IVP x1 w/ appropriate response; will c/w Lasix 40mg IVP daily  - B/l LE TTP on exam, duplex b/l LE negative for DVT  - XR negative for fracture or dislocation; probable soft tissue injury  - Pain control PRN, elevate legs

## 2021-04-21 NOTE — H&P ADULT - PROBLEM SELECTOR PROBLEM 6
Prophylactic measure Coronary artery disease, angina presence unspecified, unspecified vessel or lesion type, unspecified whether native or transplanted heart

## 2021-04-21 NOTE — PROGRESS NOTE ADULT - SUBJECTIVE AND OBJECTIVE BOX
Patient is a 81y old  Female who presents with a chief complaint of Bilateral lower extremity swelling    SUBJECTIVE / OVERNIGHT EVENTS:    Reports she fell about ~1.5 wks ago  States feels about same since arrival  No CP, SOB, f/c/n/v  Advised PT rec ISAAC, would be amenable    MEDICATIONS  (STANDING):  apixaban 5 milliGRAM(s) Oral two times a day  aspirin enteric coated 81 milliGRAM(s) Oral daily  atorvastatin 80 milliGRAM(s) Oral at bedtime  cilostazol 50 milliGRAM(s) Oral two times a day  furosemide   Injectable 40 milliGRAM(s) IV Push daily  gabapentin 300 milliGRAM(s) Oral three times a day  insulin glargine Injectable (LANTUS) 41 Unit(s) SubCutaneous at bedtime  insulin lispro (ADMELOG) corrective regimen sliding scale   SubCutaneous three times a day before meals  insulin lispro (ADMELOG) corrective regimen sliding scale   SubCutaneous at bedtime  metoprolol succinate ER 25 milliGRAM(s) Oral daily  montelukast 10 milliGRAM(s) Oral daily    T(C): 36.8 (04-21-21 @ 09:01), Max: 36.8 (04-20-21 @ 21:49)  HR: 81 (04-21-21 @ 09:01) (71 - 89)  BP: 137/59 (04-21-21 @ 09:01) (120/72 - 158/85)  RR: 18 (04-21-21 @ 09:01) (16 - 20)  SpO2: 99% (04-21-21 @ 09:01) (98% - 100%)    CAPILLARY BLOOD GLUCOSE  POCT Blood Glucose.: 233 mg/dL (21 Apr 2021 12:57)  POCT Blood Glucose.: 133 mg/dL (21 Apr 2021 08:56)  POCT Blood Glucose.: 199 mg/dL (21 Apr 2021 03:19)  POCT Blood Glucose.: 88 mg/dL (21 Apr 2021 00:44)  POCT Blood Glucose.: 137 mg/dL (20 Apr 2021 21:34)  POCT Blood Glucose.: 182 mg/dL (20 Apr 2021 15:25)    I&O's Summary    20 Apr 2021 07:01  -  21 Apr 2021 07:00  --------------------------------------------------------  IN: 200 mL / OUT: 950 mL / NET: -750 mL    PHYSICAL EXAM:  GENERAL: NAD, obese, [place], on RA, normal WOB  HEAD:  Atraumatic, Normocephalic  EYES: EOMI, PERRLA, conjunctiva and sclera clear  NECK: Supple, No JVD  CHEST/LUNG: Clear to auscultation bilaterally; No wheeze  HEART: irregular; No murmurs, rubs, or gallops  ABDOMEN: Soft, Nontender, Nondistended; Bowel sounds present  EXTREMITIES:   warm, pitting edema, R slightly more than L, small shin scab on R and abrasion on L  PSYCH: AAOx3  NEUROLOGY: non-focal    LABS:                        12.8   4.86  )-----------( 244      ( 21 Apr 2021 06:23 )             40.7     04-21    135  |  100  |  24<H>  ----------------------------<  215<H>  4.7   |  26  |  1.34<H>    Ca    9.5      21 Apr 2021 06:23  Phos  3.3     04-21  Mg     2.0     04-21    TPro  7.2  /  Alb  3.3  /  TBili  0.5  /  DBili  x   /  AST  19  /  ALT  20  /  AlkPhos  96  04-20      Notes Reviewed:  Endo  Care Discussed with Consultants/Other Providers:

## 2021-04-21 NOTE — H&P ADULT - PROBLEM SELECTOR PLAN 3
- Normotensive  - Will c/w home Metoprolol w/ hold parameters  - Low sodium diet - [id number] risk stratification score: 7  - EKG: Atrial fibrillation @ 74bpm, QTc 457 (no acute PHAN or ischemic changes)  - Continue AC w/ Eliquis 5mg BID  - f/u coags daily   - Continue rate control w/ Metoprolol 25mg daily  - No caffeine diet

## 2021-04-21 NOTE — PROGRESS NOTE ADULT - PROBLEM SELECTOR PLAN 8
- DVT ppx: Eliquis 5mg BID  - PT rec ISAAC, pt amenable - DVT ppx: Eliquis 5mg BID  - PT rec [facility]AAC, pt amenable  - no prior COVID vaccination per pt

## 2021-04-21 NOTE — H&P ADULT - NSHPREVIEWOFSYSTEMS_GEN_ALL_CORE
Constitutional Symptoms: No weakness, fevers, chills, weight loss  Eyes: No visual changes, headache, eye pain, double vision  Ears, Nose, Mouth, Throat: No runny nose, sinus pain, ear pain, tinnitus, sore throat, dysphagia, odynophagia  Cardiovascular: No chest pain, palpitations, edema  Respiratory: No cough, wheezing, hemoptysis, shortness of breath  Gastrointestinal: No abdominal pain, dysphagia, anorexia, nausea/vomiting, diarrhea/constipation, hematemesis, BRBPR, melena  Genitourinary: No dysuria, frequency, hematuria  Musculoskeletal: (+) b/l LE edema/pain; No joint pain, joint swelling, decreased ROM  Skin: No pruritus, rashes, lesions, wounds  Neurologic:  No seizures, headache, paraesthesia, numbness, limb weakness  Psychiatric: No depression, anxiety, difficulty concentrating, anhedonia, lack of energy  Endocrine: No heat/cold intolerance, mood swings, sweats, polydipsia, polyuria  Hematologic/lymphatic: No purpura, petechia, prolonged or excessive bleeding after dental extraction / injury  Allergic/Immunologic: No anaphylaxis, allergic response to materials, foods, animals    Positives and pertinent negatives noted and all other systems negative.

## 2021-04-21 NOTE — CONSULT NOTE ADULT - PROBLEM SELECTOR RECOMMENDATION 2
Generally well-controlled while pt was in the ED, w/ occasional readings above goal (Goal of 130/80).  - Home toprol continued.  - Given b/l LE edema, pt has been started on IV lasix 40 qd per primary team  - Rest of management per primary team

## 2021-04-21 NOTE — H&P ADULT - NSHPLABSRESULTS_GEN_ALL_CORE
Labs:                        11.8   5.13  )-----------( 243      ( 2021 17:14 )             38.3         138  |  102  |  24<H>  ----------------------------<  139<H>  4.9   |  29  |  1.35<H>    Ca    9.3      2021 17:30    TPro  7.2  /  Alb  3.3  /  TBili  0.5  /  DBili  x   /  AST  19  /  ALT  20  /  AlkPhos  96        CARDIAC ENZYMES:  Troponin T, High Sensitivity: 29 ng/L (21 @ 19:19)  Troponin T, High Sensitivity: 29 ng/L (21 @ 17:30)    Serum Pro-Brain Natriuretic Peptide : 582 pg/mL<H> (21 @ 17:30)     Urinanalysis Basic (21 @ 00:16):  Color: Light Yellow / Appearance: Clear / S.012 / pH: x  Gluc: x / Ketone: Negative / Bili: Negative / Urobili: <2 mg/dL  Blood: x / Protein: Trace / Nitrite: Negative  Leuk Esterase: Negative / RBC: 2 / WBC: 2  Sq Epi: x / Non Sq Epi: x / Bacteria: Negative    CAPILLARY BLOOD GLUCOSE:  POCT Blood Glucose: 137 mg/dL (21 @ 21:34)  POCT Blood Glucose: 182 mg/dL (21 @ 15:25)    COVID-19/Full RVP Panel:  NotDetec (21 @ 17:48)    CARDIOLOGY  EKG 21: Atrial fibrillation @ 74bpm, QTc 457 (no acute PHAN or ischemic changes)    TTE 2018: EF 40-45%, moderate segmental LV systolic dysfxn, hypokinetic inferior and inferolateral walls    RADIOLOGY  CXR 2021: Cardiomegaly w/ clear lungs    XR b/l hip w/ pelvis and knees: No acute fracture or dislocation. Labs:                        11.8   5.13  )-----------( 243      ( 2021 17:14 )             38.3         138  |  102  |  24<H>  ----------------------------<  139<H>  4.9   |  29  |  1.35<H>    Ca    9.3      2021 17:30    TPro  7.2  /  Alb  3.3  /  TBili  0.5  /  DBili  x   /  AST  19  /  ALT  20  /  AlkPhos  96        CARDIAC ENZYMES:  Troponin T, High Sensitivity: 29 ng/L (21 @ 19:19)  Troponin T, High Sensitivity: 29 ng/L (21 @ 17:30)    Serum Pro-Brain Natriuretic Peptide : 582 pg/mL<H> (21 @ 17:30)     Urinanalysis Basic (21 @ 00:16):  Color: Light Yellow / Appearance: Clear / S.012 / pH: x  Gluc: x / Ketone: Negative / Bili: Negative / Urobili: <2 mg/dL  Blood: x / Protein: Trace / Nitrite: Negative  Leuk Esterase: Negative / RBC: 2 / WBC: 2  Sq Epi: x / Non Sq Epi: x / Bacteria: Negative    CAPILLARY BLOOD GLUCOSE:  POCT Blood Glucose: 137 mg/dL (21 @ 21:34)  POCT Blood Glucose: 182 mg/dL (21 @ 15:25)    COVID-19/Full RVP Panel:  NotDetec (21 @ 17:48)    CARDIOLOGY  EKG 21: Atrial fibrillation @ 74bpm, QTc 457 (no acute PHAN or ischemic changes)    TTE 2018: EF 40-45%, moderate segmental LV systolic dysfxn, hypokinetic inferior and inferolateral walls    RADIOLOGY  CXR 2021: Cardiomegaly w/ clear lungs    XR b/l hip w/ pelvis and knees: No acute fracture or dislocation.    US B/l LE 21: No evidence of deep venous thrombosis in either lower extremity. Evidence of slow flow is demonstrated in bilateral lower extremity veins.

## 2021-04-21 NOTE — CONSULT NOTE ADULT - ATTENDING COMMENTS
81F uncontrolled DM2.  She uses Tresiba and Novolog at home but is a very poor historian and has a hard time describing her insulin regimen.  At first she stated she only takes the Novolog when her glucose is low not high.  She also gets confused between her 2 insulins. Will contact her outpatient endocrinologist office for more info.  She states she does not live with family but has a . Unclear if she can safely manage her insulin at home.  Endocrine team consulted for uncontrolled diabetes. Patient is high risk with high level decision making due to uncontrolled diabetes which places patient at high risk for cardiovascular and cerebrovascular events. Patient with lability of glucose requiring close monitoring and insulin adjustments.    Dayami Perez MD  Division of Endocrinology  Pager: [phone]    If after 6PM or before 9AM, or on weekends/holidays, please call endocrine answering service for assistance ([phone]).  For nonurgent matters email [email or web address] for assistance.

## 2021-04-21 NOTE — H&P ADULT - NSHPPHYSICALEXAM_GEN_ALL_CORE
Vital Signs Last 24 Hrs  T(C): 36.5 (20 Apr 2021 22:00), Max: 36.8 (20 Apr 2021 21:49)  T(F): 97.7 (20 Apr 2021 22:00), Max: 98.3 (20 Apr 2021 21:49)  HR: 73 (20 Apr 2021 22:00) (72 - 89)  BP: 123/89 (20 Apr 2021 22:00) (120/72 - 148/72)  RR: 17 (20 Apr 2021 22:00) (16 - 20)  SpO2: 100% (20 Apr 2021 22:00) (98% - 100%)    PHYSICAL EXAM:  General: Awake and alert.  No acute distress.  Head: Normocephalic, atraumatic.    Eyes: PERRL.  EOMI.  No scleral icterus.  No conjunctival pallor.  Mouth: Moist MM.  No oropharyngeal exudates.    Neck: Supple.  Full range of motion.  No JVD.  No LAD.  No thyromegaly.  Trachea midline.    Heart: Irregularly regular.  Normal S1 and S2.  No murmurs, rubs, or gallops. 2+ pitting edema LLE; 3+ pitting edema RLE  Lungs: Nonlabored breathing.  Good inspiratory effort.  CTAB.  No wheezes, crackles, or rhonchi.    Abdomen: BS+, soft, NT/ND.  No hepatomegaly.   Skin: (+) Abrasions to b/l anterior shins; Warm and dry.  No rashes.  Extremities: (+) TTP RLE. No cyanosis.  2+ peripheral pulses b/l.  Musculoskeletal: No joint deformities.  No spinal or paraspinal tenderness.  Neuro: A&Ox3.  CN II-XII intact.  5/5 motor strength in UE and LE b/l.  Tactile sensation intact in UE and LE b/l.

## 2021-04-21 NOTE — CONSULT NOTE ADULT - PROBLEM SELECTOR RECOMMENDATION 9
T2DM poorly controlled w/ hx notable for several complications including CKD III, OM, peripheral neuropathy, CAD, CHF, HTN, and HLD. Hgba1c of 9.2% on admission. Pt on 52 units of tresiba at home w/ unclear novolog sliding scale, but per pt, appears to be 2-4 units pre-meals. Pt's home regimen is noted to have a wide discrepancy b/w basal and bolus doses of insulin.   - Lantus adjusted to 30 units qhs  - Admelog pre-meals added. Will give 3 units for dinner tonight (4/21), as pt received 41 units of lantus last night. But beginning tomorrow (4/22), admelog will be 5 units qac.   - C/w low dose correctional sliding scale qac and qhs.   - Discharge recs pending based on pt's FGS during admission and given adjustments to come. Discharge goal will be to even out the wide discrepancy between her basal and bolus insulin doses. T2DM poorly controlled w/ hx notable for several complications including CKD III, OM, peripheral neuropathy, CAD, CHF, HTN, and HLD. Hgba1c of 9.2% on admission. Pt on 52 units of tresiba at home w/ unclear novolog sliding scale, but per pt, appears to be 2-4 units pre-meals. Pt's home regimen is noted to have a wide discrepancy b/w basal and bolus doses of insulin.   - Lantus adjusted to 30 units qhs  - Admelog pre-meals added. Will give Admelog 3 units for dinner tonight (4/21), as pt received 41 units of lantus last night. But beginning tomorrow (4/22), admelog will be 5 units qac TID.  - C/w low dose correctional sliding scale qac and qhs.   - Discharge recs pending based on pt's FGS during admission and given adjustments to come. Discharge goal will be to even out the wide discrepancy between her basal and bolus insulin doses.

## 2021-04-21 NOTE — H&P ADULT - PROBLEM SELECTOR PLAN 5
- s/p cardiac cath 4/2018 (pLAD 20% ISR, oD2 60%, dOM3 small with 99% ISR but supplied by collaterals from [facility], mRCA 30% with plan for aggressive medical management  - c/w ASA, Cilostazol, statin - On Tresiba 52U at bedtime w/ Novolog sliding scale  - Will give Lantus 41U now and titrate PRN  - Low ISS w/ FS qAC and qHS  - A1c in AM  - Diabetic diet

## 2021-04-21 NOTE — H&P ADULT - ATTENDING COMMENTS
80 y/o F w PMHx of HTN, HLD, AFib on Eliquis, IDDM w/ neuropathy, sHFrEF (EF 40-45% TTE 4/2018), CAD s/p cardiac cath 4/2018, PVD p/w bilateral LE edema x 1 week. Pt reports about 1 week ago she sustained a mechanical fall, denies head trauma but states her cane fell onto her legs. Since then, she reports bilateral LE swelling with pain over the right shin. She otherwise denies shortness of breath on exertion, orthopnea, chest pain. On exam, pt with +2 pitting edema, pain on palpation over right shin. Cold extremities, diminished pulses. No JVD, clear lungs, breathing comfortably. CXR clear. Probnp 500s. Dopplers neg for DVT. Xrays neg for acute fracture. LE edema more likely related to soft tissue trauma, clinically does not appear overloaded. Trialing IV lasix, monitor Is and Os. TTE pending to eval EF. If no improvement in swelling despite iv lasix, can likely d/c with outpt cardiologist f/u.

## 2021-04-21 NOTE — PROGRESS NOTE ADULT - ASSESSMENT
81 obese F with HTN, HLD, AFib on Eliquis,  DM2 on insulin w/ neuropathy, HFrEF (EF 40-45% TTE 4/2018), CAD, CKD3, OM of R 1st & 2nd toe s/p abx course presents to the hospital w/ complaints of bilateral LE edema x3 days s/p fall presumably some component from trauma but cannot r/o HF exacerbation.  81 obese (BMI 41) F with HTN, HLD, AFib on Eliquis,  DM2 on insulin w/ neuropathy, HFrEF (EF 40-45% TTE 4/2018), CAD, CKD3, OM of R 1st & 2nd toe s/p abx course presents to the hospital w/ complaints of bilateral LE edema x3 days most c/w soft tissue injury, question of venosus insufficiency.

## 2021-04-21 NOTE — H&P ADULT - PROBLEM SELECTOR PLAN 2
- [id number] risk stratisfication score: 7  - EKG: Atrial fibrillation @ 74bpm, QTc 457 (no acute PHAN or ischemic changes)  - Continue AC w/ Eliquis 5mg BID  - f/u coags daily   - Continue rate control w/ Metoprolol 25mg daily  - No caffeine diet - B/l LE edema likely 2/2 trauma but cannot r/o CHF exacerbation  - s/p Lasix 40mg IVP x1 w/ appropriate response; will c/w Lasix 40mg IVP daily  - B/l LE TTP on exam, duplex b/l LE negative for DVT  - TTE 4/2018: EF 40-45%, moderate segmental LV systolic dysfxn, hypokinetic inferior and inferolateral walls  - TTE pending  - Fluid restriction, Strict Is and Os, low sodium diet -Pt p/w le edema however no other signs of volume overload. Denies orthopnea, MORAES, no pulmonary edema or JVD on exam. Pro-bnp not significantly elevated  - B/l LE edema likely 2/2 trauma but cannot r/o CHF exacerbation  - s/p Lasix 40mg IVP x1 w/ appropriate response; will c/w Lasix 40mg IVP daily for now and reassess LE edema  - B/l LE TTP on exam, duplex b/l LE negative for DVT  - TTE 4/2018: EF 40-45%, moderate segmental LV systolic dysfxn, hypokinetic inferior and inferolateral walls  - TTE pending  - Fluid restriction, Strict Is and Os, low sodium diet

## 2021-04-21 NOTE — PROGRESS NOTE ADULT - PROBLEM SELECTOR PLAN 3
[id number] risk stratification score: 7; TSH 1.36  - c/w Eliquis 5mg BID  - c/w  Metoprolol 25mg daily [id number] risk stratification score: 7; TSH 1.36  - c/w Eliquis 5mg BID & Metoprolol 25mg daily

## 2021-04-22 DIAGNOSIS — R41.0 DISORIENTATION, UNSPECIFIED: ICD-10-CM

## 2021-04-22 LAB
ANION GAP SERPL CALC-SCNC: 14 MMOL/L — SIGNIFICANT CHANGE UP (ref 7–14)
BUN SERPL-MCNC: 32 MG/DL — HIGH (ref 7–23)
CALCIUM SERPL-MCNC: 10 MG/DL — SIGNIFICANT CHANGE UP (ref 8.4–10.5)
CHLORIDE SERPL-SCNC: 100 MMOL/L — SIGNIFICANT CHANGE UP (ref 98–107)
CO2 SERPL-SCNC: 28 MMOL/L — SIGNIFICANT CHANGE UP (ref 22–31)
COVID-19 SPIKE DOMAIN AB INTERP: NEGATIVE — SIGNIFICANT CHANGE UP
COVID-19 SPIKE DOMAIN ANTIBODY RESULT: 0.4 U/ML — SIGNIFICANT CHANGE UP
CREAT SERPL-MCNC: 1.59 MG/DL — HIGH (ref 0.5–1.3)
GLUCOSE BLDC GLUCOMTR-MCNC: 114 MG/DL — HIGH (ref 70–99)
GLUCOSE BLDC GLUCOMTR-MCNC: 145 MG/DL — HIGH (ref 70–99)
GLUCOSE BLDC GLUCOMTR-MCNC: 385 MG/DL — HIGH (ref 70–99)
GLUCOSE BLDC GLUCOMTR-MCNC: 89 MG/DL — SIGNIFICANT CHANGE UP (ref 70–99)
GLUCOSE SERPL-MCNC: 99 MG/DL — SIGNIFICANT CHANGE UP (ref 70–99)
MAGNESIUM SERPL-MCNC: 2.1 MG/DL — SIGNIFICANT CHANGE UP (ref 1.6–2.6)
PHOSPHATE SERPL-MCNC: 4.6 MG/DL — HIGH (ref 2.5–4.5)
POTASSIUM SERPL-MCNC: 5 MMOL/L — SIGNIFICANT CHANGE UP (ref 3.5–5.3)
POTASSIUM SERPL-SCNC: 5 MMOL/L — SIGNIFICANT CHANGE UP (ref 3.5–5.3)
SARS-COV-2 IGG+IGM SERPL QL IA: 0.4 U/ML — SIGNIFICANT CHANGE UP
SARS-COV-2 IGG+IGM SERPL QL IA: NEGATIVE — SIGNIFICANT CHANGE UP
SODIUM SERPL-SCNC: 142 MMOL/L — SIGNIFICANT CHANGE UP (ref 135–145)

## 2021-04-22 PROCEDURE — 90792 PSYCH DIAG EVAL W/MED SRVCS: CPT

## 2021-04-22 PROCEDURE — 99233 SBSQ HOSP IP/OBS HIGH 50: CPT

## 2021-04-22 PROCEDURE — 99232 SBSQ HOSP IP/OBS MODERATE 35: CPT | Mod: GC

## 2021-04-22 RX ORDER — HALOPERIDOL DECANOATE 100 MG/ML
0.5 INJECTION INTRAMUSCULAR ONCE
Refills: 0 | Status: COMPLETED | OUTPATIENT
Start: 2021-04-22 | End: 2021-04-22

## 2021-04-22 RX ORDER — LANOLIN ALCOHOL/MO/W.PET/CERES
3 CREAM (GRAM) TOPICAL AT BEDTIME
Refills: 0 | Status: DISCONTINUED | OUTPATIENT
Start: 2021-04-22 | End: 2021-04-30

## 2021-04-22 RX ORDER — HALOPERIDOL DECANOATE 100 MG/ML
1 INJECTION INTRAMUSCULAR ONCE
Refills: 0 | Status: COMPLETED | OUTPATIENT
Start: 2021-04-22 | End: 2021-04-22

## 2021-04-22 RX ORDER — HALOPERIDOL DECANOATE 100 MG/ML
1 INJECTION INTRAMUSCULAR ONCE
Refills: 0 | Status: DISCONTINUED | OUTPATIENT
Start: 2021-04-22 | End: 2021-04-22

## 2021-04-22 RX ORDER — HALOPERIDOL DECANOATE 100 MG/ML
1 INJECTION INTRAMUSCULAR EVERY 8 HOURS
Refills: 0 | Status: DISCONTINUED | OUTPATIENT
Start: 2021-04-22 | End: 2021-04-22

## 2021-04-22 RX ORDER — HALOPERIDOL DECANOATE 100 MG/ML
1 INJECTION INTRAMUSCULAR EVERY 8 HOURS
Refills: 0 | Status: DISCONTINUED | OUTPATIENT
Start: 2021-04-22 | End: 2021-04-30

## 2021-04-22 RX ADMIN — INSULIN GLARGINE 30 UNIT(S): 100 INJECTION, SOLUTION SUBCUTANEOUS at 21:42

## 2021-04-22 RX ADMIN — Medication 3 MILLIGRAM(S): at 19:54

## 2021-04-22 RX ADMIN — Medication 650 MILLIGRAM(S): at 04:06

## 2021-04-22 RX ADMIN — APIXABAN 5 MILLIGRAM(S): 2.5 TABLET, FILM COATED ORAL at 04:05

## 2021-04-22 RX ADMIN — CILOSTAZOL 50 MILLIGRAM(S): 100 TABLET ORAL at 04:05

## 2021-04-22 RX ADMIN — Medication 81 MILLIGRAM(S): at 16:29

## 2021-04-22 RX ADMIN — HALOPERIDOL DECANOATE 1 MILLIGRAM(S): 100 INJECTION INTRAMUSCULAR at 10:20

## 2021-04-22 RX ADMIN — Medication 25 MILLIGRAM(S): at 04:06

## 2021-04-22 RX ADMIN — HALOPERIDOL DECANOATE 0.5 MILLIGRAM(S): 100 INJECTION INTRAMUSCULAR at 07:11

## 2021-04-22 RX ADMIN — Medication 3: at 21:42

## 2021-04-22 RX ADMIN — APIXABAN 5 MILLIGRAM(S): 2.5 TABLET, FILM COATED ORAL at 16:29

## 2021-04-22 RX ADMIN — MONTELUKAST 10 MILLIGRAM(S): 4 TABLET, CHEWABLE ORAL at 16:29

## 2021-04-22 RX ADMIN — Medication 40 MILLIGRAM(S): at 04:05

## 2021-04-22 RX ADMIN — GABAPENTIN 300 MILLIGRAM(S): 400 CAPSULE ORAL at 04:06

## 2021-04-22 RX ADMIN — CILOSTAZOL 50 MILLIGRAM(S): 100 TABLET ORAL at 16:29

## 2021-04-22 RX ADMIN — GABAPENTIN 300 MILLIGRAM(S): 400 CAPSULE ORAL at 19:55

## 2021-04-22 RX ADMIN — ATORVASTATIN CALCIUM 80 MILLIGRAM(S): 80 TABLET, FILM COATED ORAL at 19:55

## 2021-04-22 RX ADMIN — Medication 650 MILLIGRAM(S): at 19:55

## 2021-04-22 NOTE — PROGRESS NOTE ADULT - PROBLEM SELECTOR PLAN 8
Consult received. Full consult note to follow.    Constance Daigle NP  Nephrology   - mild increase in Cr likely 2/2 diuresis, holding lasix and monitoring  - trend, renally dose meds, avoid nephrotoxins

## 2021-04-22 NOTE — PROGRESS NOTE ADULT - PROBLEM SELECTOR PLAN 1
Pt acutely delrious this am and agitated.  Not allowing medical care, not taking meds, pulling out IVs and yelling.  - psych eval as likely needs to be medicated to allow care given severity of agitation, collateral from family suggested prior episodes of delirium  - no s/s of infection that would precipitate likely 2/2 age and hospital environment

## 2021-04-22 NOTE — PROGRESS NOTE ADULT - ASSESSMENT
82 y/o F w PMHx of HTN, HLD, AFib on Eliquis, IDDM w/ neuropathy, sHFrEF (EF 40-45% TTE 4/2018), CAD s/p cardiac cath 4/2018 @ [facility] (pLAD 20% ISR, oD2 60%, dOM3 small with 99% ISR but supplied by collaterals from OM2, mRCA 30% with plan for aggressive medical management), osteomyelitis of R 1st & 2nd toe with gangrene 4/2018 s/p toenail removal & abx course presents to the hospital w/ complaints of bilateral LE edema x3 days, w/ endocrine c/s for diabetes management after pt's HgbA1c was found to be 9.2% on admission.

## 2021-04-22 NOTE — DIETITIAN INITIAL EVALUATION ADULT. - ADD RECOMMEND
1. Encourage & assist Pt with meals; Monitor PO diet tolerance; Honor food preferences;          2. Monitor labs, hydration status;          3. Will recommend to follow up with appropriate RDN upon discharge for the purposes of long-term nutrition evaluation and diet education;

## 2021-04-22 NOTE — PROGRESS NOTE ADULT - PROBLEM SELECTOR PLAN 6
At home on Tresiba 52U at bedtime w/ Novolog sliding scale; poor control with HbA1c of >9  - c/w lantus 30 units and 5 units with meals  - RD consult  - appreciate endocrine recs   - c/w gabapentin for neuropathy

## 2021-04-22 NOTE — CHART NOTE - NSCHARTNOTEFT_GEN_A_CORE
called to evaluate pt who was confused; screaming at staff to get out of her house.  upon arrival was oriented to name and answering some questions appropriately but still confused and screaming.  attempted to call son to help orient but no answer, pt then took cord and refused to let go requiring 4 nurses to remove cord from her hands.  haldol 0.5 x1 given. resting in bed will continue to monitor called to evaluate pt who was confused; screaming at staff to get out of her house.  upon arrival was oriented to name and answering some questions appropriately but still confused and screaming.  attempted to call son to help orient but no answer, pt then took cord and refused to let go requiring 4 nurses to remove cord from her hands.  haldol 0.5 x1 given. resting in bed will continue to monitor. qtc 457     addendum to above 4/22/21 9:10am   pt is extremely agitated calling for the police.  son called back pt normally a&0x 3 at home; has gotten confused in the past when hospitalized.  refusing tele and  all interventions requiring staff to hold pt.  received 0.5 im haldol earlier (did not receive 2nd dose of 0.5), while trying to inject 0.5 some was wasted as pt was moving.  will give haldol 1mg iv x 1.  son in agreement with medicating his mother for safety and care.   consult called for further management recs. called to evaluate pt who was confused; screaming at staff to get out of her house.  upon arrival was oriented to name and answering some questions appropriately but still confused and screaming.  attempted to call son and family to help orient but no answer, pt then took cord and refused to let go requiring 4 nurses to remove cord from her hands.  haldol 0.5 x1 given. resting in bed will continue to monitor. qtc 457     addendum to above 4/22/21 9:10am   pt is extremely agitated calling for the police.  son called back pt normally a&0x 3 at home; has gotten confused in the past when hospitalized.  refusing tele and  all interventions requiring staff to hold pt.  received 0.5 im haldol earlier (did not receive 2nd dose of 0.5), while trying to inject 0.5 some was wasted as pt was moving.  will give haldol 1mg iv x 1.  son in agreement with medicating his mother for safety and care.   consult called for further management recs.

## 2021-04-22 NOTE — DIETITIAN INITIAL EVALUATION ADULT. - OTHER INFO
Pt 80 yo obese (BMI 41) female with HTN, HLD, AFib, DM2, HFrEF, CAD, CKD3, OM of R 1st & 2nd toe s/p abx course presented to the hospital w/ complaints of bilateral LE edema x3 days - per chart review.      At time of visit Pt drowsy; case discussed with nurse. Dilaudid given to Pt 2/2 her restlessness. Pt did not eat much for today reported. No report of nausea, vomiting or diarrhea @ this time. Unable to discuss diet or weight history @ present. Of note Pt's HbA1c level 9.2% (4/21). Unable to provide Consistent Carbohydrate diet education at present. Will reattempt to visit Pt at a later time as able. Will recommend to follow up with appropriate RDN upon discharge for the purposes of long-term nutrition evaluation and diet education. RDN remains available.

## 2021-04-22 NOTE — DIETITIAN INITIAL EVALUATION ADULT. - PERTINENT LABORATORY DATA
(4/22) BUN 32 H, Creat 1.59 H, phosphorus 4.6 H;          (4/12) HbA1c 9.2% H;              (4/20) Albumin 3.3

## 2021-04-22 NOTE — BH CONSULTATION LIAISON ASSESSMENT NOTE - NSBHCHARTREVIEWLAB_PSY_A_CORE FT
CBC Full  -  ( 21 Apr 2021 06:23 )  WBC Count : 4.86 K/uL  RBC Count : 5.34 M/uL  Hemoglobin : 12.8 g/dL  Hematocrit : 40.7 %  Platelet Count - Automated : 244 K/uL  Mean Cell Volume : 76.2 fL  Mean Cell Hemoglobin : 24.0 pg  Mean Cell Hemoglobin Concentration : 31.4 gm/dL  Auto Neutrophil # : 2.16 K/uL  Auto Lymphocyte # : 2.12 K/uL  Auto Monocyte # : 0.38 K/uL  Auto Eosinophil # : 0.16 K/uL  Auto Basophil # : 0.03 K/uL  Auto Neutrophil % : 44.5 %  Auto Lymphocyte % : 43.6 %  Auto Monocyte % : 7.8 %  Auto Eosinophil % : 3.3 %  Auto Basophil % : 0.6 %  04-22    142  |  100  |  32<H>  ----------------------------<  99  5.0   |  28  |  1.59<H>    Ca    10.0      22 Apr 2021 06:31  Phos  4.6     04-22  Mg     2.1     04-22    TPro  7.2  /  Alb  3.3  /  TBili  0.5  /  DBili  x   /  AST  19  /  ALT  20  /  AlkPhos  96  04-20

## 2021-04-22 NOTE — BH CONSULTATION LIAISON ASSESSMENT NOTE - NSBHCHARTREVIEWVS_PSY_A_CORE FT
Vital Signs Last 24 Hrs  T(C): 36.7 (22 Apr 2021 12:11), Max: 36.8 (21 Apr 2021 16:52)  T(F): 98 (22 Apr 2021 12:11), Max: 98.2 (21 Apr 2021 16:52)  HR: 99 (22 Apr 2021 12:11) (71 - 170)  BP: 140/80 (22 Apr 2021 12:11) (131/72 - 153/90)  BP(mean): --  RR: 16 (22 Apr 2021 12:11) (16 - 18)  SpO2: 100% (22 Apr 2021 12:11) (99% - 100%)

## 2021-04-22 NOTE — PROGRESS NOTE ADULT - ATTENDING COMMENTS
81F uncontrolled DM2.  She uses Tresiba and Novolog at home but is a very poor historian and has a hard time describing her insulin regimen.  At first she stated she only takes the Novolog when her glucose is low not high.  She also gets confused between her 2 insulins. Unable to reach her outpatient endocrinologist office despite several attempts.  She states she does not live with family but has a . Unclear if she can safely manage her insulin at home.  Would evaluate for home care or other services after discharge.    Dayami Perez MD  Division of Endocrinology  Pager: [phone]    If after 6PM or before 9AM, or on weekends/holidays, please call endocrine answering service for assistance ([phone]).  For nonurgent matters email [email or web address] for assistance.

## 2021-04-22 NOTE — PROGRESS NOTE ADULT - PROBLEM SELECTOR PLAN 2
Reports noted x 1 wk, recent fall with trauma to the RLE.  Denies CP or SOB. X-ray neg for fracture, soft tissue swelling.  LE US negative for DVT.  UA only trace protein.  Reports does not have chronic LE edema however reliability questionable.  2018 no significant PAD of RLE.   - s/p IV lasix 40, will hold further diuretics and monitor  - daily weight, strict I&O  - TTE shows no changes from 2018  - c/w pain control with tylenol and elevate LE  - PT rec ISAAC, pt aware reports amenable on 4/21, now delirious

## 2021-04-22 NOTE — BH CONSULTATION LIAISON ASSESSMENT NOTE - CURRENT MEDICATION
MEDICATIONS  (STANDING):  acetaminophen   Tablet .. 650 milliGRAM(s) Oral every 8 hours  apixaban 5 milliGRAM(s) Oral two times a day  aspirin enteric coated 81 milliGRAM(s) Oral daily  atorvastatin 80 milliGRAM(s) Oral at bedtime  cilostazol 50 milliGRAM(s) Oral two times a day  dextrose 40% Gel 15 Gram(s) Oral once  dextrose 5%. 1000 milliLiter(s) (50 mL/Hr) IV Continuous <Continuous>  dextrose 5%. 1000 milliLiter(s) (100 mL/Hr) IV Continuous <Continuous>  dextrose 50% Injectable 25 Gram(s) IV Push once  dextrose 50% Injectable 12.5 Gram(s) IV Push once  dextrose 50% Injectable 25 Gram(s) IV Push once  gabapentin 300 milliGRAM(s) Oral three times a day  insulin glargine Injectable (LANTUS) 30 Unit(s) SubCutaneous at bedtime  insulin lispro (ADMELOG) corrective regimen sliding scale   SubCutaneous three times a day before meals  insulin lispro (ADMELOG) corrective regimen sliding scale   SubCutaneous at bedtime  insulin lispro Injectable (ADMELOG) 5 Unit(s) SubCutaneous three times a day before meals  melatonin 3 milliGRAM(s) Oral at bedtime  metoprolol succinate ER 25 milliGRAM(s) Oral daily  montelukast 10 milliGRAM(s) Oral daily    MEDICATIONS  (PRN):  haloperidol    Injectable 1 milliGRAM(s) IntraMuscular every 8 hours PRN agitation

## 2021-04-22 NOTE — PROGRESS NOTE ADULT - PROBLEM SELECTOR PLAN 1
T2DM poorly controlled w/ hx notable for several complications including CKD III, OM, peripheral neuropathy, CAD, CHF, HTN, and HLD. Hgba1c of 9.2% on admission. Pt stating that she's on 52 units of tresiba at home w/ unclear novolog sliding scale (pt stating 2-4 units at times, other times unclear, pharmacy saying that pt has not been on novolog since December and last was 10 units TID). Pt's inpatient regimen adjusted O/N w/ FGS ranging from 114-189.   - C/w Lantus 30 units qhs.  - C/w Admelog 5 units qac TID.  - C/w low dose correctional sliding scale qac and qhs.   - Will continue to attempt to clarify w/ pt's endocrinologist her current home regimen.   - Discharge recs pending based on pt's FGS during admission as well as pending better understanding of pt's ability to administer insulin/ have adequate home services. Safety is of great concern given pt's uncertainty of her different types of insulin, doses, etc. T2DM poorly controlled w/ hx notable for several complications including CKD III, OM, peripheral neuropathy, CAD, CHF, HTN, and HLD. Hgba1c of 9.2% on admission. Pt stating that she's on 52 units of tresiba at home w/ unclear novolog sliding scale (pt stating 2-4 units at times, other times unclear). Pt's inpatient regimen adjusted O/N w/ FGS ranging from 114-189, and pt not eating breakfast or lunch today given her agitation and subsequent sedation from IM haldol.   - C/w Lantus 30 units qhs.  - C/w Admelog 5 units qac TID.  - C/w low dose correctional sliding scale qac and qhs.   - Will continue to attempt to clarify w/ pt's endocrinologist her current home regimen.   - Discharge recs pending based on pt's FGS during admission as well as pending better understanding of pt's ability to administer insulin/ have adequate home services. Safety is of great concern given pt's uncertainty of her different types of insulin, doses, etc.

## 2021-04-22 NOTE — PROGRESS NOTE ADULT - SUBJECTIVE AND OBJECTIVE BOX
NOTE INCOMPLETE/ IN PROGRESS  *Please wait for attending attestation for official recommendations.     Chief Complaint:     History:    MEDICATIONS  (STANDING):  acetaminophen   Tablet .. 650 milliGRAM(s) Oral every 8 hours  apixaban 5 milliGRAM(s) Oral two times a day  aspirin enteric coated 81 milliGRAM(s) Oral daily  atorvastatin 80 milliGRAM(s) Oral at bedtime  cilostazol 50 milliGRAM(s) Oral two times a day  dextrose 40% Gel 15 Gram(s) Oral once  dextrose 5%. 1000 milliLiter(s) (50 mL/Hr) IV Continuous <Continuous>  dextrose 5%. 1000 milliLiter(s) (100 mL/Hr) IV Continuous <Continuous>  dextrose 50% Injectable 25 Gram(s) IV Push once  dextrose 50% Injectable 12.5 Gram(s) IV Push once  dextrose 50% Injectable 25 Gram(s) IV Push once  gabapentin 300 milliGRAM(s) Oral three times a day  insulin glargine Injectable (LANTUS) 30 Unit(s) SubCutaneous at bedtime  insulin lispro (ADMELOG) corrective regimen sliding scale   SubCutaneous three times a day before meals  insulin lispro (ADMELOG) corrective regimen sliding scale   SubCutaneous at bedtime  insulin lispro Injectable (ADMELOG) 5 Unit(s) SubCutaneous three times a day before meals  metoprolol succinate ER 25 milliGRAM(s) Oral daily  montelukast 10 milliGRAM(s) Oral daily    MEDICATIONS  (PRN):      Allergies    No Known Allergies    Intolerances      Review of Systems:  Constitutional:       ALL OTHER SYSTEMS REVIEWED AND NEGATIVE      PHYSICAL EXAM:  VITALS: T(C): 36.5 (04-22-21 @ 05:01)  T(F): 97.7 (04-22-21 @ 05:01), Max: 98.2 (04-21-21 @ 16:52)  HR: 89 (04-22-21 @ 05:01) (71 - 89)  BP: 141/75 (04-22-21 @ 05:01) (131/72 - 153/90)  RR:  (16 - 18)  SpO2:  (99% - 100%)  Wt(kg): --  PHYSICAL EXAM:  General:       CAPILLARY BLOOD GLUCOSE  POCT Blood Glucose.: 145 mg/dL (22 Apr 2021 08:58)  POCT Blood Glucose.: 145 mg/dL (21 Apr 2021 21:20)  POCT Blood Glucose.: 189 mg/dL (21 Apr 2021 17:47)  POCT Blood Glucose.: 233 mg/dL (21 Apr 2021 12:57)      04-22    142  |  100  |  32<H>  ----------------------------<  99  5.0   |  28  |  1.59<H>    EGFR if : 35<L>  EGFR if non : 30<L>    Ca    10.0      04-22  Mg     2.1     04-22  Phos  4.6     04-22    TPro  7.2  /  Alb  3.3  /  TBili  0.5  /  DBili  x   /  AST  19  /  ALT  20  /  AlkPhos  96  04-20      A1C with Estimated Average Glucose Result: 9.2 % (04-21-21 @ 06:23)  A1C with Estimated Average Glucose Result: 8.8 % (07-29-20 @ 11:45)      Thyroid Function Tests:  04-21 @ 06:23 TSH 1.36 FreeT4 -- T3 -- Anti TPO -- Anti Thyroglobulin Ab -- TSI -- *Please wait for attending attestation for official recommendations.     Chief Complaint: B/l LE swelling w/ Endocrine c/s for diabetes management     History:  Endocrine was consulted on admission given that pt's HbgA1c on admission was 9.2%. O/N events appreciated, including that pt became agitated and required IM haldol. This AM, pt was sedated, and unable to undergo ROS. Pt's outpatient endocrinologist Dr. Chawla was contacted in order to obtain better information of pt's home regimen; however, he could not be reached at either of the two office numbers listed for him. Will reattempt. In the interim, pt's pharmacy was called. They stated that pt's tresiba is prescribed as 200 units/ mL- 70 units qhs, w/ pt last picking up this prescription on 4/20. Pt's novolog was last picked up in December and was prescribed as 10 units TID. Given pt's confusion from yesterday regarding her insulin regimen w/ Hgba1c of 9.2%, will attempt to assess her ability to self inject and obtain further collateral from her endocrinologist.     Pt's insulin regimen was adjusted yesterday to 30 units SQ qhs and 5 units admelog pre-meal w/ low dose correctional scale qac and qhs. In the past 24 hours, pt's FGS have ranged from 114-189, w/ pt not requiring from sliding scale.       MEDICATIONS  (STANDING):  acetaminophen   Tablet .. 650 milliGRAM(s) Oral every 8 hours  apixaban 5 milliGRAM(s) Oral two times a day  aspirin enteric coated 81 milliGRAM(s) Oral daily  atorvastatin 80 milliGRAM(s) Oral at bedtime  cilostazol 50 milliGRAM(s) Oral two times a day  dextrose 40% Gel 15 Gram(s) Oral once  dextrose 5%. 1000 milliLiter(s) (50 mL/Hr) IV Continuous <Continuous>  dextrose 5%. 1000 milliLiter(s) (100 mL/Hr) IV Continuous <Continuous>  dextrose 50% Injectable 25 Gram(s) IV Push once  dextrose 50% Injectable 12.5 Gram(s) IV Push once  dextrose 50% Injectable 25 Gram(s) IV Push once  gabapentin 300 milliGRAM(s) Oral three times a day  insulin glargine Injectable (LANTUS) 30 Unit(s) SubCutaneous at bedtime  insulin lispro (ADMELOG) corrective regimen sliding scale   SubCutaneous three times a day before meals  insulin lispro (ADMELOG) corrective regimen sliding scale   SubCutaneous at bedtime  insulin lispro Injectable (ADMELOG) 5 Unit(s) SubCutaneous three times a day before meals  metoprolol succinate ER 25 milliGRAM(s) Oral daily  montelukast 10 milliGRAM(s) Oral daily      Allergies  No Known Allergies    Review of Systems:  UNABLE TO OBTAIN AS PT WAS SEDATED S/P HALDOL ADMINISTRATION      PHYSICAL EXAM:  VITALS: T(C): 36.5 (04-22-21 @ 05:01)  T(F): 97.7 (04-22-21 @ 05:01), Max: 98.2 (04-21-21 @ 16:52)  HR: 89 (04-22-21 @ 05:01) (71 - 89)  BP: 141/75 (04-22-21 @ 05:01) (131/72 - 153/90)  RR:  (16 - 18)  SpO2:  (99% - 100%)  Wt(kg): --  PHYSICAL EXAM:  General: Sedated, lying in bed   Heart: Irregularly regular.   Lungs: Nonlabored breathing. CTAB.   Extremities: 2+ b/l pitting ededma       CAPILLARY BLOOD GLUCOSE  POCT Blood Glucose.: 145 mg/dL (22 Apr 2021 08:58)  POCT Blood Glucose.: 145 mg/dL (21 Apr 2021 21:20)  POCT Blood Glucose.: 189 mg/dL (21 Apr 2021 17:47)  POCT Blood Glucose.: 233 mg/dL (21 Apr 2021 12:57)      04-22    142  |  100  |  32<H>  ----------------------------<  99  5.0   |  28  |  1.59<H>    EGFR if : 35<L>  EGFR if non : 30<L>    Ca    10.0      04-22  Mg     2.1     04-22  Phos  4.6     04-22    TPro  7.2  /  Alb  3.3  /  TBili  0.5  /  DBili  x   /  AST  19  /  ALT  20  /  AlkPhos  96  04-20      A1C with Estimated Average Glucose Result: 9.2 % (04-21-21 @ 06:23)  A1C with Estimated Average Glucose Result: 8.8 % (07-29-20 @ 11:45)      Thyroid Function Tests:  04-21 @ 06:23 TSH 1.36 FreeT4 -- T3 -- Anti TPO -- Anti Thyroglobulin Ab -- TSI -- *Please wait for attending attestation for official recommendations.     Chief Complaint: B/l LE swelling w/ Endocrine c/s for diabetes management     History:  Endocrine was consulted on admission given that pt's HbgA1c on admission was 9.2%. O/N events appreciated, including that pt became agitated and required IM haldol. This AM, pt was sedated, and unable to undergo ROS. Pt's outpatient endocrinologist Dr. Chawla was contacted in order to obtain better information of pt's home regimen; however, he could not be reached at either of the two office numbers listed for him. Will reattempt. In the interim, pt's pharmacy was called. They stated that pt's tresiba is prescribed as 200 units/ mL- 70 units qhs, w/ pt last picking up this prescription on 4/20. Pt's novolog was last picked up in December and was prescribed as 10 units TID. Unclear if pt's regimen has been adjusted at home w/o rx update. Given pt's confusion from yesterday regarding her insulin regimen w/ Hgba1c of 9.2%, will attempt to assess her ability to self inject and obtain further collateral from her endocrinologist. Discussed concerns about pt's ability to properly and safely manage her insulin regimen w/ pt's RN and KWAN Martinez, instructing RN to please assess pt's ability to self-inject properly using practice pens and discussing need for more comprehensive home care services after ISAAC w/ Michelle.     Pt's insulin regimen was adjusted yesterday to 30 units SQ qhs and 5 units admelog pre-meal w/ low dose correctional scale qac and qhs. In the past 24 hours, pt's FGS have ranged from 114-189, w/ pt not requiring additional coverage from sliding scale and pt not eating breakfast/ lunch today due to agitation and subsequent sedation from IM haldol.       MEDICATIONS  (STANDING):  acetaminophen   Tablet .. 650 milliGRAM(s) Oral every 8 hours  apixaban 5 milliGRAM(s) Oral two times a day  aspirin enteric coated 81 milliGRAM(s) Oral daily  atorvastatin 80 milliGRAM(s) Oral at bedtime  cilostazol 50 milliGRAM(s) Oral two times a day  dextrose 40% Gel 15 Gram(s) Oral once  dextrose 5%. 1000 milliLiter(s) (50 mL/Hr) IV Continuous <Continuous>  dextrose 5%. 1000 milliLiter(s) (100 mL/Hr) IV Continuous <Continuous>  dextrose 50% Injectable 25 Gram(s) IV Push once  dextrose 50% Injectable 12.5 Gram(s) IV Push once  dextrose 50% Injectable 25 Gram(s) IV Push once  gabapentin 300 milliGRAM(s) Oral three times a day  insulin glargine Injectable (LANTUS) 30 Unit(s) SubCutaneous at bedtime  insulin lispro (ADMELOG) corrective regimen sliding scale   SubCutaneous three times a day before meals  insulin lispro (ADMELOG) corrective regimen sliding scale   SubCutaneous at bedtime  insulin lispro Injectable (ADMELOG) 5 Unit(s) SubCutaneous three times a day before meals  metoprolol succinate ER 25 milliGRAM(s) Oral daily  montelukast 10 milliGRAM(s) Oral daily      Allergies  No Known Allergies    Review of Systems:  UNABLE TO OBTAIN AS PT WAS SEDATED S/P HALDOL ADMINISTRATION      PHYSICAL EXAM:  VITALS: T(C): 36.5 (04-22-21 @ 05:01)  T(F): 97.7 (04-22-21 @ 05:01), Max: 98.2 (04-21-21 @ 16:52)  HR: 89 (04-22-21 @ 05:01) (71 - 89)  BP: 141/75 (04-22-21 @ 05:01) (131/72 - 153/90)  RR:  (16 - 18)  SpO2:  (99% - 100%)  Wt(kg): --  PHYSICAL EXAM:  General: Sedated, lying in bed   Heart: Irregularly regular.   Lungs: Nonlabored breathing. CTAB.   Extremities: 2+ b/l pitting ededma       CAPILLARY BLOOD GLUCOSE  POCT Blood Glucose.: 145 mg/dL (22 Apr 2021 08:58)  POCT Blood Glucose.: 145 mg/dL (21 Apr 2021 21:20)  POCT Blood Glucose.: 189 mg/dL (21 Apr 2021 17:47)  POCT Blood Glucose.: 233 mg/dL (21 Apr 2021 12:57)      04-22    142  |  100  |  32<H>  ----------------------------<  99  5.0   |  28  |  1.59<H>    EGFR if : 35<L>  EGFR if non : 30<L>    Ca    10.0      04-22  Mg     2.1     04-22  Phos  4.6     04-22    TPro  7.2  /  Alb  3.3  /  TBili  0.5  /  DBili  x   /  AST  19  /  ALT  20  /  AlkPhos  96  04-20      A1C with Estimated Average Glucose Result: 9.2 % (04-21-21 @ 06:23)  A1C with Estimated Average Glucose Result: 8.8 % (07-29-20 @ 11:45)      Thyroid Function Tests:  04-21 @ 06:23 TSH 1.36 FreeT4 -- T3 -- Anti TPO -- Anti Thyroglobulin Ab -- TSI --     Chief Complaint: B/l LE swelling w/ Endocrine c/s for diabetes management     History:  Endocrine was consulted on admission given that pt's HbgA1c on admission was 9.2%. O/N events appreciated, including that pt became agitated and required IM haldol. This AM, pt was sedated, and unable to undergo ROS. Pt's outpatient endocrinologist Dr. Chawla was contacted in order to obtain better information of pt's home regimen; however, he could not be reached at either of the two office numbers listed for him. Will reattempt. In the interim, pt's pharmacy was called. They stated that pt's tresiba is prescribed as 200 units/ mL- 70 units qhs, w/ pt last picking up this prescription on 4/20. Pt's novolog was last picked up in December and was prescribed as 10 units TID. Unclear if pt's regimen has been adjusted at home w/o rx update. Given pt's confusion from yesterday regarding her insulin regimen w/ Hgba1c of 9.2%, will attempt to assess her ability to self inject and obtain further collateral from her endocrinologist. Discussed concerns about pt's ability to properly and safely manage her insulin regimen w/ pt's RN and ACP Michelle, instructing RN to please assess pt's ability to self-inject properly using practice pens and discussing need for more comprehensive home care services after ISAAC w/ Michelle.     Pt's insulin regimen was adjusted yesterday to 30 units SQ qhs and 5 units admelog pre-meal w/ low dose correctional scale qac and qhs. In the past 24 hours, pt's FGS have ranged from 114-189, w/ pt not requiring additional coverage from sliding scale and pt not eating breakfast/ lunch today due to agitation and subsequent sedation from IM haldol.       MEDICATIONS  (STANDING):  acetaminophen   Tablet .. 650 milliGRAM(s) Oral every 8 hours  apixaban 5 milliGRAM(s) Oral two times a day  aspirin enteric coated 81 milliGRAM(s) Oral daily  atorvastatin 80 milliGRAM(s) Oral at bedtime  cilostazol 50 milliGRAM(s) Oral two times a day  dextrose 40% Gel 15 Gram(s) Oral once  dextrose 5%. 1000 milliLiter(s) (50 mL/Hr) IV Continuous <Continuous>  dextrose 5%. 1000 milliLiter(s) (100 mL/Hr) IV Continuous <Continuous>  dextrose 50% Injectable 25 Gram(s) IV Push once  dextrose 50% Injectable 12.5 Gram(s) IV Push once  dextrose 50% Injectable 25 Gram(s) IV Push once  gabapentin 300 milliGRAM(s) Oral three times a day  insulin glargine Injectable (LANTUS) 30 Unit(s) SubCutaneous at bedtime  insulin lispro (ADMELOG) corrective regimen sliding scale   SubCutaneous three times a day before meals  insulin lispro (ADMELOG) corrective regimen sliding scale   SubCutaneous at bedtime  insulin lispro Injectable (ADMELOG) 5 Unit(s) SubCutaneous three times a day before meals  metoprolol succinate ER 25 milliGRAM(s) Oral daily  montelukast 10 milliGRAM(s) Oral daily      Allergies  No Known Allergies    Review of Systems:  UNABLE TO OBTAIN AS PT WAS SEDATED S/P HALDOL ADMINISTRATION      PHYSICAL EXAM:  VITALS: T(C): 36.5 (04-22-21 @ 05:01)  T(F): 97.7 (04-22-21 @ 05:01), Max: 98.2 (04-21-21 @ 16:52)  HR: 89 (04-22-21 @ 05:01) (71 - 89)  BP: 141/75 (04-22-21 @ 05:01) (131/72 - 153/90)  RR:  (16 - 18)  SpO2:  (99% - 100%)  Wt(kg): --  PHYSICAL EXAM:  General: Sedated, lying in bed   Heart: Irregularly regular.   Lungs: Nonlabored breathing. CTAB.   Extremities: 2+ b/l pitting ededma       CAPILLARY BLOOD GLUCOSE  POCT Blood Glucose.: 145 mg/dL (22 Apr 2021 08:58)  POCT Blood Glucose.: 145 mg/dL (21 Apr 2021 21:20)  POCT Blood Glucose.: 189 mg/dL (21 Apr 2021 17:47)  POCT Blood Glucose.: 233 mg/dL (21 Apr 2021 12:57)      04-22    142  |  100  |  32<H>  ----------------------------<  99  5.0   |  28  |  1.59<H>    EGFR if : 35<L>  EGFR if non : 30<L>    Ca    10.0      04-22  Mg     2.1     04-22  Phos  4.6     04-22    TPro  7.2  /  Alb  3.3  /  TBili  0.5  /  DBili  x   /  AST  19  /  ALT  20  /  AlkPhos  96  04-20      A1C with Estimated Average Glucose Result: 9.2 % (04-21-21 @ 06:23)  A1C with Estimated Average Glucose Result: 8.8 % (07-29-20 @ 11:45)      Thyroid Function Tests:  04-21 @ 06:23 TSH 1.36 FreeT4 -- T3 -- Anti TPO -- Anti Thyroglobulin Ab -- TSI --

## 2021-04-22 NOTE — PROGRESS NOTE ADULT - PROBLEM SELECTOR PLAN 2
Generally well-controlled, w/ occasional readings above goal (Goal of 130/80).  - Home toprol continued.  - Given b/l LE edema, pt has been started on IV lasix 40 qd per primary team. However, pt ripped out IV. To be addressed by primary team.   - Rest of management per primary team.

## 2021-04-22 NOTE — BH CONSULTATION LIAISON ASSESSMENT NOTE - MEDICAL RECORD REVIEWED
Yes

## 2021-04-22 NOTE — PROGRESS NOTE ADULT - SUBJECTIVE AND OBJECTIVE BOX
Patient is a 81y old  Female who presents with a chief complaint of LE swelling, R>L    SUBJECTIVE / OVERNIGHT EVENTS:    [place] yelling as approached room, calling for the "lord" and praying and yelling, agitated in bed  Attempted to talk to pt and continues to yell about the lord and how we are trying to kill her, advised she is hospital and that she came for leg pain   Pt continued to shout and could not be redirected.   Pt increasingly agitated if attempts may to go near her or examine her.  Stating get away from me and don't touch me.  Would not even allow providers to place hands on bed rails.    Refusing to eat or take meds.  Ripped out her IV. Pulled off tele monitor.     MEDICATIONS  (STANDING):  acetaminophen Tablet 650 milliGRAM(s) Oral every 8 hours  apixaban 5 milliGRAM(s) Oral two times a day  aspirin enteric coated 81 milliGRAM(s) Oral daily  atorvastatin 80 milliGRAM(s) Oral at bedtime  cilostazol 50 milliGRAM(s) Oral two times a day  furosemide   Injectable 40 milliGRAM(s) IV Push daily  gabapentin 300 milliGRAM(s) Oral three times a day  insulin glargine Injectable (LANTUS) 30 Unit(s) SubCutaneous at bedtime  insulin lispro (ADMELOG) corrective regimen sliding scale   SubCutaneous three times a day before meals  insulin lispro (ADMELOG) corrective regimen sliding scale   SubCutaneous at bedtime  insulin lispro Injectable (ADMELOG) 5 Unit(s) SubCutaneous three times a day before meals  metoprolol succinate ER 25 milliGRAM(s) Oral daily  montelukast 10 milliGRAM(s) Oral daily    T(C): 36.5 (04-22-21 @ 05:01), Max: 36.8 (04-21-21 @ 16:52)  HR: 89 (04-22-21 @ 05:01) (71 - 89)  BP: 141/75 (04-22-21 @ 05:01) (131/72 - 153/90)  RR: 16 (04-22-21 @ 05:01) (16 - 18)  SpO2: 100% (04-22-21 @ 05:01) (99% - 100%)    CAPILLARY BLOOD GLUCOSE  POCT Blood Glucose.: 145 mg/dL (22 Apr 2021 08:58)  POCT Blood Glucose.: 145 mg/dL (21 Apr 2021 21:20)  POCT Blood Glucose.: 189 mg/dL (21 Apr 2021 17:47)  POCT Blood Glucose.: 233 mg/dL (21 Apr 2021 12:57)    I&O's Summary    21 Apr 2021 07:01  -  22 Apr 2021 07:00  --------------------------------------------------------  IN: 540 mL / OUT: 1300 mL / NET: -760 mL    PHYSICAL EXAM:  Refuse exam  Laying in bed agitated but otherwise in NAD, breathing appears nonlabored and is speaking in full sentences  Moving lower and upper ext and rolling in bed  LE appear less swollen than yesterday however could not touch them to assess edema improvement    LABS:                        12.8   4.86  )-----------( 244      ( 21 Apr 2021 06:23 )             40.7     04-22    142  |  100  |  32<H>  ----------------------------<  99  5.0   |  28  |  1.59<H>    Ca    10.0      22 Apr 2021 06:31  Phos  4.6     04-22  Mg     2.1     04-22    TPro  7.2  /  Alb  3.3  /  TBili  0.5  /  DBili  x   /  AST  19  /  ALT  20  /  AlkPhos  96  04-20      Microbiology: Culture Results:   <10,000 CFU/mL Normal Urogenital Stella (04-20 @ 21:43)    TTE  CONCLUSIONS:  1. Mitral annular calcification, otherwise normal mitral  valve. Mild mitral regurgitation.  2. Moderately dilated left atrium.  LA volume index = 45  cc/m2.  3. Normal left ventricular internal dimensions and wall  thicknesses.  4. Endocardium not well visualized; grossly mild segmental  left ventricular systolic dysfunction. Possible  inferolateral wall hypokinesis.  5. The right ventricle is not well visualized; grossly  normal right ventricular systolic function.    Notes Reviewed:    Care Discussed with Consultants/Other Providers:   Patient is a 81y old  Female who presents with a chief complaint of LE swelling, R>L    SUBJECTIVE / OVERNIGHT EVENTS:    [place] yelling as approached room, calling for the "lord" and praying and yelling, agitated in bed  Attempted to talk to pt and continues to yell about the lord and how we are trying to kill her, advised she is hospital and that she came for leg pain   Pt continued to shout and could not be redirected.   Pt increasingly agitated if attempts made to go near her or bed  to examine her.  Stating get away from me and don't touch me.  Would not even allow providers to place hands on bed rails.    Refusing to eat or take meds.  Ripped out her IV. Pulled off tele monitor.  Per RN was hitting and tried to bite.     MEDICATIONS  (STANDING):  acetaminophen Tablet 650 milliGRAM(s) Oral every 8 hours  apixaban 5 milliGRAM(s) Oral two times a day  aspirin enteric coated 81 milliGRAM(s) Oral daily  atorvastatin 80 milliGRAM(s) Oral at bedtime  cilostazol 50 milliGRAM(s) Oral two times a day  furosemide   Injectable 40 milliGRAM(s) IV Push daily  gabapentin 300 milliGRAM(s) Oral three times a day  insulin glargine Injectable (LANTUS) 30 Unit(s) SubCutaneous at bedtime  insulin lispro (ADMELOG) corrective regimen sliding scale   SubCutaneous three times a day before meals  insulin lispro (ADMELOG) corrective regimen sliding scale   SubCutaneous at bedtime  insulin lispro Injectable (ADMELOG) 5 Unit(s) SubCutaneous three times a day before meals  metoprolol succinate ER 25 milliGRAM(s) Oral daily  montelukast 10 milliGRAM(s) Oral daily    T(C): 36.5 (04-22-21 @ 05:01), Max: 36.8 (04-21-21 @ 16:52)  HR: 89 (04-22-21 @ 05:01) (71 - 89)  BP: 141/75 (04-22-21 @ 05:01) (131/72 - 153/90)  RR: 16 (04-22-21 @ 05:01) (16 - 18)  SpO2: 100% (04-22-21 @ 05:01) (99% - 100%)    CAPILLARY BLOOD GLUCOSE  POCT Blood Glucose.: 145 mg/dL (22 Apr 2021 08:58)  POCT Blood Glucose.: 145 mg/dL (21 Apr 2021 21:20)  POCT Blood Glucose.: 189 mg/dL (21 Apr 2021 17:47)  POCT Blood Glucose.: 233 mg/dL (21 Apr 2021 12:57)    I&O's Summary    21 Apr 2021 07:01  -  22 Apr 2021 07:00  --------------------------------------------------------  IN: 540 mL / OUT: 1300 mL / NET: -760 mL    PHYSICAL EXAM:  Refuse exam  Laying in bed agitated but otherwise in NAD, breathing appears nonlabored and is speaking in full sentences  Moving lower and upper ext and rolling in bed  LE appear less swollen than yesterday however could not touch them to assess edema improvement    LABS:                        12.8   4.86  )-----------( 244      ( 21 Apr 2021 06:23 )             40.7     04-22    142  |  100  |  32<H>  ----------------------------<  99  5.0   |  28  |  1.59<H>    Ca    10.0      22 Apr 2021 06:31  Phos  4.6     04-22  Mg     2.1     04-22    TPro  7.2  /  Alb  3.3  /  TBili  0.5  /  DBili  x   /  AST  19  /  ALT  20  /  AlkPhos  96  04-20      Microbiology: Culture Results:   <10,000 CFU/mL Normal Urogenital Stella (04-20 @ 21:43)    TTE  CONCLUSIONS:  1. Mitral annular calcification, otherwise normal mitral  valve. Mild mitral regurgitation.  2. Moderately dilated left atrium.  LA volume index = 45  cc/m2.  3. Normal left ventricular internal dimensions and wall  thicknesses.  4. Endocardium not well visualized; grossly mild segmental  left ventricular systolic dysfunction. Possible  inferolateral wall hypokinesis.  5. The right ventricle is not well visualized; grossly  normal right ventricular systolic function.    Notes Reviewed:    Care Discussed with Consultants/Other Providers:

## 2021-04-22 NOTE — BH CONSULTATION LIAISON ASSESSMENT NOTE - SUMMARY
The patient is a 81 year old obese (BMI 41) F with a history of delirium when hospitalized, no known prior psychiatric history, HTN, HLD, AFib on Eliquis,  DM2 on insulin w/ neuropathy, HFrEF (EF 40-45% TTE 4/2018), CAD, CKD3, OM of R 1st & 2nd toe s/p abx course presents to the hospital w/ complaints of bilateral LE edema x3 days most c/w soft tissue injury, question of venosus insufficiency. Psychiatry has been consulted for agitation/aggression/confusion in the last 24 hours. Haldol 1.5mg was give this morning for extreme aggression.     Sedated, does not wake up. Discussed with RN- who is aware and stated vitals stable. Discussed with CJ Benitez as well.   Suspecting delirium at this time, as medicine team confirmed that at baseline patient is oriented x 3 at home.     Recommendations  - No indications for 1:1, but monitor behaviors  - Coordinate care with family  - Ensure that delirium work up is done  - Melatonin 3mg q 8pm PO- to help with sleep wake reversal  - AGGRESSION=====Haldol 1mg q 8hrs prn IM/IV/PO-- hold for qtc>500

## 2021-04-22 NOTE — PROGRESS NOTE ADULT - PROBLEM SELECTOR PLAN 3
LE edema without other signs of volume overload. Denies orthopnea, MORAES, no pulmonary edema or JVD on exam. Pro-bnp not significantly elevated (can be falsely low in obesity).  TTE 4/2018: EF 40-45%, moderate segmental LV systolic dysfxn, hypokinetic inferior and inferolateral walls  - s/p IV lasix  - TTE unchanged as above  - fluid restriction, strict I&Os, low sodium diet

## 2021-04-22 NOTE — BH CONSULTATION LIAISON ASSESSMENT NOTE - HPI (INCLUDE ILLNESS QUALITY, SEVERITY, DURATION, TIMING, CONTEXT, MODIFYING FACTORS, ASSOCIATED SIGNS AND SYMPTOMS)
The patient is a 81 year old obese (BMI 41) F with a history of delirium when hospitalized, no known prior psychiatric history, HTN, HLD, AFib on Eliquis,  DM2 on insulin w/ neuropathy, HFrEF (EF 40-45% TTE 4/2018), CAD, CKD3, OM of R 1st & 2nd toe s/p abx course presents to the hospital w/ complaints of bilateral LE edema x3 days most c/w soft tissue injury, question of venosus insufficiency. Psychiatry has been consulted for agitation/aggression/confusion in the last 24 hours. Haldol 1.5mg was give this morning for extreme aggression.     Met with the patient. Sedated, does not wake up. Discussed with RN- who is aware and stated vitals stable. Discussed with CJ Benitez as well.

## 2021-04-22 NOTE — PROGRESS NOTE ADULT - ASSESSMENT
81 obese (BMI 41) F with HTN, HLD, AFib on Eliquis,  DM2 on insulin w/ neuropathy, HFrEF (EF 40-45% TTE 4/2018), CAD, CKD3, OM of R 1st & 2nd toe s/p abx course presents to the hospital w/ complaints of bilateral LE edema x3 days most c/w soft tissue injury, question of venosus insufficiency.

## 2021-04-23 ENCOUNTER — TRANSCRIPTION ENCOUNTER (OUTPATIENT)
Age: 81
End: 2021-04-23

## 2021-04-23 LAB
ANION GAP SERPL CALC-SCNC: 12 MMOL/L — SIGNIFICANT CHANGE UP (ref 7–14)
BUN SERPL-MCNC: 47 MG/DL — HIGH (ref 7–23)
CALCIUM SERPL-MCNC: 9.5 MG/DL — SIGNIFICANT CHANGE UP (ref 8.4–10.5)
CHLORIDE SERPL-SCNC: 99 MMOL/L — SIGNIFICANT CHANGE UP (ref 98–107)
CO2 SERPL-SCNC: 27 MMOL/L — SIGNIFICANT CHANGE UP (ref 22–31)
CREAT SERPL-MCNC: 1.77 MG/DL — HIGH (ref 0.5–1.3)
GLUCOSE BLDC GLUCOMTR-MCNC: 122 MG/DL — HIGH (ref 70–99)
GLUCOSE BLDC GLUCOMTR-MCNC: 184 MG/DL — HIGH (ref 70–99)
GLUCOSE BLDC GLUCOMTR-MCNC: 220 MG/DL — HIGH (ref 70–99)
GLUCOSE BLDC GLUCOMTR-MCNC: 286 MG/DL — HIGH (ref 70–99)
GLUCOSE SERPL-MCNC: 278 MG/DL — HIGH (ref 70–99)
MAGNESIUM SERPL-MCNC: 2.3 MG/DL — SIGNIFICANT CHANGE UP (ref 1.6–2.6)
PHOSPHATE SERPL-MCNC: 3.8 MG/DL — SIGNIFICANT CHANGE UP (ref 2.5–4.5)
POTASSIUM SERPL-MCNC: 4.9 MMOL/L — SIGNIFICANT CHANGE UP (ref 3.5–5.3)
POTASSIUM SERPL-SCNC: 4.9 MMOL/L — SIGNIFICANT CHANGE UP (ref 3.5–5.3)
SODIUM SERPL-SCNC: 138 MMOL/L — SIGNIFICANT CHANGE UP (ref 135–145)

## 2021-04-23 PROCEDURE — 99232 SBSQ HOSP IP/OBS MODERATE 35: CPT

## 2021-04-23 PROCEDURE — 99233 SBSQ HOSP IP/OBS HIGH 50: CPT

## 2021-04-23 RX ORDER — INSULIN GLARGINE 100 [IU]/ML
34 INJECTION, SOLUTION SUBCUTANEOUS AT BEDTIME
Refills: 0 | Status: DISCONTINUED | OUTPATIENT
Start: 2021-04-23 | End: 2021-04-23

## 2021-04-23 RX ORDER — INSULIN LISPRO 100/ML
6 VIAL (ML) SUBCUTANEOUS
Refills: 0 | Status: DISCONTINUED | OUTPATIENT
Start: 2021-04-23 | End: 2021-04-30

## 2021-04-23 RX ORDER — HUMAN INSULIN 100 [IU]/ML
18 INJECTION, SUSPENSION SUBCUTANEOUS ONCE
Refills: 0 | Status: COMPLETED | OUTPATIENT
Start: 2021-04-23 | End: 2021-04-23

## 2021-04-23 RX ORDER — INSULIN GLARGINE 100 [IU]/ML
34 INJECTION, SOLUTION SUBCUTANEOUS
Refills: 0 | Status: DISCONTINUED | OUTPATIENT
Start: 2021-04-24 | End: 2021-04-29

## 2021-04-23 RX ADMIN — Medication 5 UNIT(S): at 13:36

## 2021-04-23 RX ADMIN — Medication 650 MILLIGRAM(S): at 05:03

## 2021-04-23 RX ADMIN — GABAPENTIN 300 MILLIGRAM(S): 400 CAPSULE ORAL at 13:35

## 2021-04-23 RX ADMIN — ATORVASTATIN CALCIUM 80 MILLIGRAM(S): 80 TABLET, FILM COATED ORAL at 22:07

## 2021-04-23 RX ADMIN — CILOSTAZOL 50 MILLIGRAM(S): 100 TABLET ORAL at 05:03

## 2021-04-23 RX ADMIN — CILOSTAZOL 50 MILLIGRAM(S): 100 TABLET ORAL at 17:15

## 2021-04-23 RX ADMIN — GABAPENTIN 300 MILLIGRAM(S): 400 CAPSULE ORAL at 22:07

## 2021-04-23 RX ADMIN — Medication 3 MILLIGRAM(S): at 22:08

## 2021-04-23 RX ADMIN — Medication 25 MILLIGRAM(S): at 05:03

## 2021-04-23 RX ADMIN — Medication 3: at 09:41

## 2021-04-23 RX ADMIN — Medication 2: at 13:36

## 2021-04-23 RX ADMIN — Medication 5 UNIT(S): at 09:40

## 2021-04-23 RX ADMIN — Medication 650 MILLIGRAM(S): at 13:35

## 2021-04-23 RX ADMIN — APIXABAN 5 MILLIGRAM(S): 2.5 TABLET, FILM COATED ORAL at 17:15

## 2021-04-23 RX ADMIN — MONTELUKAST 10 MILLIGRAM(S): 4 TABLET, CHEWABLE ORAL at 09:42

## 2021-04-23 RX ADMIN — Medication 6 UNIT(S): at 17:21

## 2021-04-23 RX ADMIN — Medication 650 MILLIGRAM(S): at 22:07

## 2021-04-23 RX ADMIN — GABAPENTIN 300 MILLIGRAM(S): 400 CAPSULE ORAL at 05:03

## 2021-04-23 RX ADMIN — Medication 650 MILLIGRAM(S): at 13:57

## 2021-04-23 RX ADMIN — APIXABAN 5 MILLIGRAM(S): 2.5 TABLET, FILM COATED ORAL at 05:03

## 2021-04-23 RX ADMIN — Medication 81 MILLIGRAM(S): at 09:42

## 2021-04-23 RX ADMIN — HUMAN INSULIN 18 UNIT(S): 100 INJECTION, SUSPENSION SUBCUTANEOUS at 22:47

## 2021-04-23 NOTE — PROGRESS NOTE ADULT - PROBLEM SELECTOR PLAN 2
Reports noted x 1 wk, recent fall with trauma to the RLE.  Denies CP or SOB. X-ray neg for fracture, soft tissue swelling.  LE US negative for DVT.  UA only trace protein.  Reports does not have chronic LE edema however reliability questionable.  2018 no significant PAD of RLE.   - s/p IV lasix 40, will hold further diuretics and monitor  - edema improved, denies LE pain presently   - TTE shows no changes from 2018  - c/w pain control with Tylenol and elevate LE  - PT rec ISAAC, pt aware reports amenable on 4/21, now delirious

## 2021-04-23 NOTE — DISCHARGE NOTE PROVIDER - NSDCMRMEDTOKEN_GEN_ALL_CORE_FT
aspirin 81 mg oral tablet: 1 tab(s) orally once a day  cilostazol 50 mg oral tablet: 1 tab(s) orally 2 times a day  Eliquis 5 mg oral tablet: 1 tab(s) orally 2 times a day  gabapentin 300 mg oral capsule: 1 cap(s) orally 3 times a day   metoprolol succinate 25 mg oral tablet, extended release: 1 tab(s) orally once a day  montelukast 10 mg oral tablet: 1 tab(s) orally once a day  NovoLOG 100 units/mL subcutaneous solution: -200 -- 2U  FS &gt;200 -- 4U  rosuvastatin 20 mg oral tablet: 1 tab(s) orally once a day  Tresiba 100 units/mL subcutaneous solution: 52 unit(s) subcutaneous once a day (at bedtime)  Zetia 10 mg oral tablet: 1 tab(s) orally once a day   amLODIPine 10 mg oral tablet: 1 tab(s) orally once a day  aspirin 81 mg oral tablet: 1 tab(s) orally once a day  cilostazol 50 mg oral tablet: 1 tab(s) orally 2 times a day  Eliquis 5 mg oral tablet: 1 tab(s) orally 2 times a day  gabapentin 300 mg oral capsule: 1 cap(s) orally 3 times a day   Insulin Pen Needles, 4mm: 1 application subcutaneously 4 times a day. ** Use with insulin pen **   metoprolol succinate 25 mg oral tablet, extended release: 1 tab(s) orally once a day  montelukast 10 mg oral tablet: 1 tab(s) orally once a day  NovoLOG FlexPen 100 units/mL injectable solution: 6 unit(s) injectable 3 times a day (before meals)   rosuvastatin 20 mg oral tablet: 1 tab(s) orally once a day  Tresiba FlexTouch 100 units/mL subcutaneous solution: 28 unit(s) subcutaneous once a day (at bedtime)   Zetia 10 mg oral tablet: 1 tab(s) orally once a day

## 2021-04-23 NOTE — PROGRESS NOTE ADULT - PROBLEM SELECTOR PLAN 8
- mild increase in Cr likely 2/2 diuresis, holding lasix and monitoring  - trend, renally dose meds, avoid nephrotoxins

## 2021-04-23 NOTE — OCCUPATIONAL THERAPY INITIAL EVALUATION ADULT - GENERAL OBSERVATIONS, REHAB EVAL
Patient received semisupine in bed in NAD. Per RN Dayne, patient okay to participate in OT evaluation. +tele. +Primafit.

## 2021-04-23 NOTE — PROGRESS NOTE ADULT - PROBLEM SELECTOR PLAN 6
At home on Tresiba 52U at bedtime w/ Novolog sliding scale; poor control with HbA1c of >9  - c/w lantus 30 units and 5 units with meals  - RD consult  - pt given syringe with insulin and was able to self-inject however given memory issues doubt can do 4x a day insulin   - appreciate endocrine recs   - c/w gabapentin for neuropathy

## 2021-04-23 NOTE — DISCHARGE NOTE PROVIDER - HOSPITAL COURSE
81 obese (BMI 41) F with HTN, HLD, AFib on Eliquis,  DM2 on insulin w/ neuropathy, HFrEF (EF 40-45% TTE 4/2018), CAD, CKD3, OM of R 1st & 2nd toe s/p abx course presents to the hospital w/ complaints of bilateral LE edema x3 days most c/w soft tissue injury, question of venosus insufficiency.     Hospital Course:   Leg edema: Reports noted x 1 wk, recent fall with trauma to the RLE.  Denies CP or SOB. X-ray neg for fracture, soft tissue swelling.  LE US negative for DVT.  UA only trace protein.  Reports does not have chronic LE edema however reliability questionable.  2018 no significant PAD of RLE.   - s/p IV lasix 40, will hold further diuretics and monitor  - edema improved, denies LE pain presently   - TTE shows no changes from 2018  - c/w pain control with Tylenol and elevate LE  - PT rec ISAAC, Pt aware reports amenable on 4/21, now delirious.     Chronic systolic heart failure: LE edema without other signs of volume overload. Denies orthopnea, MORAES, no pulmonary edema or JVD on exam. Pro-bnp not significantly elevated (can be falsely low in obesity).  TTE 4/2018: EF 40-45%, moderate segmental LV systolic dysfxn, hypokinetic inferior and inferolateral walls.  - s/p IV lasix, TTE unchanged as above, fluid restriction, strict I&Os, low sodium diet.     Delirium: Pt acutely delrious this am and agitated.  Not allowing medical care, not taking meds, pulling out IVs and yelling.  - psych eval as likely needs to be medicated to allow care given severity of agitation, collateral from family suggested prior episodes of delirium  - no s/s of infection that would precipitate likely 2/2 age and hospital environment, now improved on 4/23  - advised son likely has underlying dementia and should f/u with OP neuro.     Chronic atrial fibrillation: [id number] risk stratification score: 7; TSH 1.36. c/w Eliquis 5mg BID & Metoprolol 25mg daily.     Hypertension: c/w metoprolol. DASH diet.     Type 2 diabetes mellitus with diabetic neuropathy, with long-term current use of insulin: At home on Tresiba 52U at bedtime w/ Novolog sliding scale; poor control with HbA1c of >9  - RD consult. Pt given syringe with insulin and was able to self-inject however given memory issues doubt can do 4x a day insulin   - Endocrine following, c/w basal/bolus while inpt.   - c/w gabapentin for neuropathy.    Coronary artery disease, angina presence unspecified, unspecified vessel or lesion type, unspecified whether native or transplanted heart.  Plan: Cardiac cath 4/2018 (pLAD 20% ISR, oD2 60%, dOM3 small with 99% ISR but supplied by collaterals from OM2, mRCA 30% with plan for aggressive medical management  - c/w asa, Cilostazol, statin.     Stage 3b chronic kidney disease: mild increase in Cr likely 2/2 diuresis, holding lasix and monitoring. Trend, renally dose meds, avoid nephrotoxins.     Case discussed with  ****, Pt medically cleared for discharge to rehab with outpt follow up as directed.      81 obese (BMI 41) F with HTN, HLD, AFib on Eliquis,  DM2 on insulin w/ neuropathy, HFrEF (EF 40-45% TTE 4/2018), CAD, CKD3, OM of R 1st & 2nd toe s/p abx course presents to the hospital w/ complaints of bilateral LE edema x3 days most c/w soft tissue injury, question of venosus insufficiency.     Hospital Course:   Leg edema: Reports noted x 1 wk, recent fall with trauma to the RLE.  Denies CP or SOB. X-ray neg for fracture, soft tissue swelling.  LE US negative for DVT.  UA only trace protein.  Reports does not have chronic LE edema however reliability questionable.  2018 no significant PAD of RLE.   - s/p IV lasix 40, will hold further diuretics and monitor  - edema improved, denies LE pain presently   - TTE shows no changes from 2018  - c/w pain control with Tylenol and elevate LE  - PT rec ISAAC, Pt aware reports amenable on 4/21, now delirious.     Chronic systolic heart failure: LE edema without other signs of volume overload. Denies orthopnea, MORAES, no pulmonary edema or JVD on exam. Pro-bnp not significantly elevated (can be falsely low in obesity).  TTE 4/2018: EF 40-45%, moderate segmental LV systolic dysfxn, hypokinetic inferior and inferolateral walls.  - s/p IV lasix, TTE unchanged as above, fluid restriction, strict I&Os, low sodium diet.     Delirium: Pt acutely delrious this am and agitated.  Not allowing medical care, not taking meds, pulling out IVs and yelling.  - psych eval as likely needs to be medicated to allow care given severity of agitation, collateral from family suggested prior episodes of delirium  - no s/s of infection that would precipitate likely 2/2 age and hospital environment, now improved on 4/23  - advised son likely has underlying dementia and should f/u with OP neuro.     Chronic atrial fibrillation: [id number] risk stratification score: 7; TSH 1.36. c/w Eliquis 5mg BID & Metoprolol 25mg daily.     Hypertension: c/w metoprolol. DASH diet.     Type 2 diabetes mellitus with diabetic neuropathy, with long-term current use of insulin: At home on Tresiba 52U at bedtime w/ Novolog sliding scale; poor control with HbA1c of >9  - RD consult. Pt given syringe with insulin and was able to self-inject however given memory issues doubt can do 4x a day insulin   - Endocrine following, c/w basal/bolus while inpt.   - c/w gabapentin for neuropathy.    Coronary artery disease, angina presence unspecified, unspecified vessel or lesion type, unspecified whether native or transplanted heart.  Plan: Cardiac cath 4/2018 (pLAD 20% ISR, oD2 60%, dOM3 small with 99% ISR but supplied by collaterals from OM2, mRCA 30% with plan for aggressive medical management  - c/w asa, Cilostazol, statin.     Stage 3b chronic kidney disease: mild increase in Cr likely 2/2 diuresis, holding lasix and monitoring. Trend, renally dose meds, avoid nephrotoxins.     Patient seen and evaluated. Reviewed discharge medications with patient and attending. All new medications requiring new prescriptions were sent to the pharmacy of patient's choice. Reviewed need for prescription for previous home medications and new prescriptions sent if requested. Medically cleared/stable for discharge as per  _____ on _____ with appropriate follow up. Patient understands and agrees with plan of care.      81 obese (BMI 41) F with HTN, HLD, AFib on Eliquis,  DM2 on insulin w/ neuropathy, HFrEF (EF 40-45% TTE 4/2018), CAD, CKD3, OM of R 1st & 2nd toe s/p abx course presents to the hospital w/ complaints of bilateral LE edema x3 days most c/w soft tissue injury, question of venosus insufficiency.     Hospital Course:   Leg edema: Reports noted x 1 wk, recent fall with trauma to the RLE.  Denies CP or SOB. X-ray neg for fracture, soft tissue swelling.  LE US negative for DVT.  UA only trace protein.  Reports does not have chronic LE edema however reliability questionable.  2018 no significant PAD of RLE.   - s/p IV lasix 40, will hold further diuretics and monitor  - edema improved, denies LE pain presently   - TTE shows no changes from 2018  - c/w pain control with Tylenol and elevate LE  - PT rec ISAAC, Pt aware reports amenable on 4/21, now delirious.     Chronic systolic heart failure: LE edema without other signs of volume overload. Denies orthopnea, MORAES, no pulmonary edema or JVD on exam. Pro-bnp not significantly elevated (can be falsely low in obesity).  TTE 4/2018: EF 40-45%, moderate segmental LV systolic dysfxn, hypokinetic inferior and inferolateral walls.  - s/p IV lasix, TTE unchanged as above, fluid restriction, strict I&Os, low sodium diet.     Delirium: Pt acutely delrious this am and agitated.  Not allowing medical care, not taking meds, pulling out IVs and yelling.  - psych eval as likely needs to be medicated to allow care given severity of agitation, collateral from family suggested prior episodes of delirium  - no s/s of infection that would precipitate likely 2/2 age and hospital environment, now improved on 4/23  - advised son likely has underlying dementia and should f/u with OP neuro.     Chronic atrial fibrillation: [id number] risk stratification score: 7; TSH 1.36. c/w Eliquis 5mg BID & Metoprolol 25mg daily.     Hypertension: c/w metoprolol. DASH diet.     Type 2 diabetes mellitus with diabetic neuropathy, with long-term current use of insulin: At home on Tresiba 52U at bedtime w/ Novolog sliding scale; poor control with HbA1c of >9  - RD consult. Pt given syringe with insulin and was able to self-inject however given memory issues doubt can do 4x a day insulin   - Endocrine following, c/w basal/bolus while inpt.   - c/w gabapentin for neuropathy.  - insulin education completed, patient able to demonstrate     Coronary artery disease, angina presence unspecified, unspecified vessel or lesion type, unspecified whether native or transplanted heart.  Plan: Cardiac cath 4/2018 (pLAD 20% ISR, oD2 60%, dOM3 small with 99% ISR but supplied by collaterals from OM2, mRCA 30% with plan for aggressive medical management  - c/w asa, Cilostazol, statin.     Stage 3b chronic kidney disease: mild increase in Cr likely 2/2 diuresis, holding lasix and monitoring. Trend, renally dose meds, avoid nephrotoxins.     Patient seen and evaluated. Reviewed discharge medications with patient and attending. All new medications requiring new prescriptions were sent to the pharmacy of patient's choice. Reviewed need for prescription for previous home medications and new prescriptions sent if requested. Medically cleared/stable for discharge as per Dr. Barros on 4/29 with appropriate follow up. Patient understands and agrees with plan of care.

## 2021-04-23 NOTE — BH CONSULTATION LIAISON PROGRESS NOTE - CURRENT MEDICATION
MEDICATIONS  (STANDING):  acetaminophen   Tablet .. 650 milliGRAM(s) Oral every 8 hours  apixaban 5 milliGRAM(s) Oral two times a day  aspirin enteric coated 81 milliGRAM(s) Oral daily  atorvastatin 80 milliGRAM(s) Oral at bedtime  cilostazol 50 milliGRAM(s) Oral two times a day  dextrose 40% Gel 15 Gram(s) Oral once  dextrose 5%. 1000 milliLiter(s) (50 mL/Hr) IV Continuous <Continuous>  dextrose 5%. 1000 milliLiter(s) (100 mL/Hr) IV Continuous <Continuous>  dextrose 50% Injectable 25 Gram(s) IV Push once  dextrose 50% Injectable 12.5 Gram(s) IV Push once  dextrose 50% Injectable 25 Gram(s) IV Push once  gabapentin 300 milliGRAM(s) Oral three times a day  insulin glargine Injectable (LANTUS) 30 Unit(s) SubCutaneous at bedtime  insulin lispro (ADMELOG) corrective regimen sliding scale   SubCutaneous three times a day before meals  insulin lispro (ADMELOG) corrective regimen sliding scale   SubCutaneous at bedtime  insulin lispro Injectable (ADMELOG) 5 Unit(s) SubCutaneous three times a day before meals  melatonin 3 milliGRAM(s) Oral at bedtime  metoprolol succinate ER 25 milliGRAM(s) Oral daily  montelukast 10 milliGRAM(s) Oral daily    MEDICATIONS  (PRN):  haloperidol    Injectable 1 milliGRAM(s) IntraMuscular every 8 hours PRN aggression

## 2021-04-23 NOTE — PROGRESS NOTE ADULT - PROBLEM SELECTOR PLAN 1
target bg 100-180 mg/dl  - Increase Lantus to 34 units SQ q 0800- start tomorrow  - Transition basal insulin to AM with NPH 18 units tonight  - Increase Admelog to 6 units qac TID.  - C/w low dose correctional sliding scale qac and qhs.   Son reports pt takes tresiba 52 units at night (when she remembers to and novolog scale.  D/w son regarding dc planning.  Encouraged son to arrange for more help to remind patient to take insulins, if possible.  They considering hiring for 5 days per week vs possibly moving to [place] with another sibling.   - DC planning- patient adequately demonstrated insulin pen use.  However, Safety is of great concern given pt's uncertainty of her different types of insulin, doses, and memory.  For now, would recommend tresiba be changed to Q AM (therefore will move Lantus to AM dosing inpatient).  Also start novolog premeal for at least breakfast and lunch so that patient has someone to remind her.  Final DC plan tbd

## 2021-04-23 NOTE — PROGRESS NOTE ADULT - ASSESSMENT
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

## 2021-04-23 NOTE — BH CONSULTATION LIAISON PROGRESS NOTE - NSBHCHARTREVIEWLAB_PSY_A_CORE FT
04-22    142  |  100  |  32<H>  ----------------------------<  99  5.0   |  28  |  1.59<H>    Ca    10.0      22 Apr 2021 06:31  Phos  4.6     04-22  Mg     2.1     04-22

## 2021-04-23 NOTE — DISCHARGE NOTE PROVIDER - CARE PROVIDER_API CALL
Rupinder Lunsford  CARDIOVASCULAR DISEASE  [id number] [place] [address]  Phone: [phone]  Fax: [phone]  Follow Up Time:     Your Primary Care Doctor,   Phone: (   )    -  Fax: (   )    -  Follow Up Time:

## 2021-04-23 NOTE — BH CONSULTATION LIAISON PROGRESS NOTE - CASE SUMMARY
Met with the patient this morning. Discussed case with CJ Quinonez, impression and plan discussed and agreed upon. Patient is oriented x 3 today, denies any mood symptoms, denies any si or hi, or psychosis.  Continue plan as above.

## 2021-04-23 NOTE — BH CONSULTATION LIAISON PROGRESS NOTE - NSBHFUPINTERVALHXFT_PSY_A_CORE
80y/o F with hx of delirium when hospitalized, no known prior psych hx, was extremely agitated and delirious yesterday requiring Psychiatry consult and Haldol 1.5mg administration. Patient calm and cooperative today. Patient alert and oriented to name, place, time, and event. Denies SI/HI, AH, VH, and paranoia. Patient has no access to guns or any other weapons at home.   Patient lives at home on her own, and has a home health attendant that assists patient with ADLs. 80y/o F with hx of delirium when hospitalized, no known prior psych hx, was extremely agitated and delirious yesterday requiring Psychiatry consult and Haldol 1.5mg administration. Patient calm and cooperative today, laying on bed. Patient alert and oriented to name, place, time, and event. Denies SI/HI, AH, VH, and paranoia. Patient has no access to guns or any other weapons at home.   Patient lives at home on her own, and has a home health attendant that assists patient with ADLs.

## 2021-04-23 NOTE — PROGRESS NOTE ADULT - SUBJECTIVE AND OBJECTIVE BOX
Chief Complaint: B/l LE swelling w/ Endocrine c/s for diabetes management     History:  patient able to return demonstrate pens.  Patient reports she has a HHA that helps remind her to take her insulins.  Spoke with son, reports she takes tresiba 52 units at night and on novolog scale.  As per son and CM, patient has HHA 3x/week 8a-2pm       MEDICATIONS  (STANDING):  acetaminophen   Tablet .. 650 milliGRAM(s) Oral every 8 hours  apixaban 5 milliGRAM(s) Oral two times a day  aspirin enteric coated 81 milliGRAM(s) Oral daily  atorvastatin 80 milliGRAM(s) Oral at bedtime  cilostazol 50 milliGRAM(s) Oral two times a day  dextrose 40% Gel 15 Gram(s) Oral once  dextrose 5%. 1000 milliLiter(s) (50 mL/Hr) IV Continuous <Continuous>  dextrose 5%. 1000 milliLiter(s) (100 mL/Hr) IV Continuous <Continuous>  dextrose 50% Injectable 25 Gram(s) IV Push once  dextrose 50% Injectable 12.5 Gram(s) IV Push once  dextrose 50% Injectable 25 Gram(s) IV Push once  gabapentin 300 milliGRAM(s) Oral three times a day  insulin glargine Injectable (LANTUS) 30 Unit(s) SubCutaneous at bedtime  insulin lispro (ADMELOG) corrective regimen sliding scale   SubCutaneous three times a day before meals  insulin lispro (ADMELOG) corrective regimen sliding scale   SubCutaneous at bedtime  insulin lispro Injectable (ADMELOG) 5 Unit(s) SubCutaneous three times a day before meals  metoprolol succinate ER 25 milliGRAM(s) Oral daily  montelukast 10 milliGRAM(s) Oral daily      Allergies  No Known Allergies    Review of Systems:  UNABLE TO OBTAIN AS PT WAS SEDATED S/P HALDOL ADMINISTRATION      Vital Signs Last 24 Hrs    T(C): 36.6 (23 Apr 2021 12:01), Max: 37.1 (22 Apr 2021 16:29)  T(F): 97.8 (23 Apr 2021 12:01), Max: 98.7 (22 Apr 2021 16:29)  HR: 72 (23 Apr 2021 12:01) (72 - 98)  BP: 145/67 (23 Apr 2021 12:01) (124/64 - 145/67)  BP(mean): --  RR: 16 (23 Apr 2021 12:01) (14 - 16)  SpO2: 100% (23 Apr 2021 12:01) (99% - 100%)  PHYSICAL EXAM:  General: Sedated, lying in bed   Heart: Irregularly regular.   Lungs: Nonlabored breathing. CTAB.   Extremities: 2+ b/l pitting ededma       CAPILLARY BLOOD GLUCOSE  POCT Blood Glucose.: 220 mg/dL (23 Apr 2021 12:26)  POCT Blood Glucose.: 286 mg/dL (23 Apr 2021 08:42)  POCT Blood Glucose.: 385 mg/dL (22 Apr 2021 21:40)  POCT Blood Glucose.: 89 mg/dL (22 Apr 2021 17:27)    POCT Blood Glucose.: 145 mg/dL (22 Apr 2021 08:58)  POCT Blood Glucose.: 145 mg/dL (21 Apr 2021 21:20)  POCT Blood Glucose.: 189 mg/dL (21 Apr 2021 17:47)  POCT Blood Glucose.: 233 mg/dL (21 Apr 2021 12:57)      04-23    138  |  99  |  47<H>  ----------------------------<  278<H>  4.9   |  27  |  1.77<H>    Ca    9.5      23 Apr 2021 12:31  Phos  3.8     04-23  Mg     2.3     04-23          A1C with Estimated Average Glucose Result: 9.2 % (04-21-21 @ 06:23)  A1C with Estimated Average Glucose Result: 8.8 % (07-29-20 @ 11:45)      Thyroid Function Tests:  04-21 @ 06:23 TSH 1.36 FreeT4 -- T3 -- Anti TPO -- Anti Thyroglobulin Ab -- TSI --

## 2021-04-23 NOTE — DISCHARGE NOTE PROVIDER - NSDCCPCAREPLAN_GEN_ALL_CORE_FT
PRINCIPAL DISCHARGE DIAGNOSIS  Diagnosis: CHF exacerbation  Assessment and Plan of Treatment:       SECONDARY DISCHARGE DIAGNOSES  Diagnosis: Fall  Assessment and Plan of Treatment:     Diagnosis: Edema  Assessment and Plan of Treatment:      PRINCIPAL DISCHARGE DIAGNOSIS  Diagnosis: CHF exacerbation  Assessment and Plan of Treatment: Continue regimen from hospital. Follow heart healthy diet. Monitor weight. If you develop severe lower extremity swelling and shortness of breath please seek medial attention. Follow up with your PCP and cardiologist for further evaluation and managment. Please call to make an appointment.        SECONDARY DISCHARGE DIAGNOSES  Diagnosis: Chronic atrial fibrillation  Assessment and Plan of Treatment: Please continue your medications as directed and follow-up with your primary provider/cardiologist to further manage your care. Monitor for signs/symptoms of uncontrolled atrial fibrillation, such as, increased heart rate, palpitations, chest pain, dizziness, or shortness of breath - Return to emergency room if these signs/symptoms are present.      Diagnosis: Delirium  Assessment and Plan of Treatment: Continue safe reorientaiton    Diagnosis: Type 2 diabetes mellitus with diabetic neuropathy, with long-term current use of insulin  Assessment and Plan of Treatment: Continue consistent carbohydrate diet.  Monitor blood glucose levels throughout the day before meals and at bedtime.  Record blood sugars and bring to outpatient providers appointment in order to be reviewed by your doctor for management modifications.  Be aware of diabetes management symptoms including feeling cool and clammy may be related to low glucose levels.  Feeling hot and dry may indicate high glucose levels.  If  you feel these symptoms, check your blood sugar.  Make regular podiatry appointments in order to have feet checked for wounds and toe nails cut by a doctor to prevent infections.

## 2021-04-23 NOTE — BH CONSULTATION LIAISON PROGRESS NOTE - NSBHASSESSMENTFT_PSY_ALL_CORE
The patient is a 81 year old obese (BMI 41) F with a history of delirium when hospitalized, no known prior psychiatric history, HTN, HLD, AFib on Eliquis,  DM2 on insulin w/ neuropathy, HFrEF (EF 40-45% TTE 4/2018), CAD, CKD3, OM of R 1st & 2nd toe s/p abx course presents to the hospital w/ complaints of bilateral LE edema x3 days most c/w soft tissue injury, question of venosus insufficiency. Psychiatry has been consulted for agitation/aggression/confusion in the last 24 hours. Haldol 1.5mg was give this morning for extreme aggression.     Sedated, does not wake up. Discussed with RN- who is aware and stated vitals stable. Discussed with CJ Benitez as well.   Suspecting delirium at this time, as medicine team confirmed that at baseline patient is oriented x 3 at home.     04/23 - Patient is calm and cooperative, A&O x4.     Recommendations  - No indications for 1:1, but monitor behaviors  - Coordinate care with family  - Ensure that delirium work up is done  - Melatonin 3mg q 8pm PO- to help with sleep wake reversal  - AGGRESSION=====Haldol 1mg q 8hrs prn IM/IV/PO-- hold for qtc>500 The patient is a 81 year old obese (BMI 41) F with a history of delirium when hospitalized, no known prior psychiatric history, HTN, HLD, AFib on Eliquis,  DM2 on insulin w/ neuropathy, HFrEF (EF 40-45% TTE 4/2018), CAD, CKD3, OM of R 1st & 2nd toe s/p abx course presents to the hospital w/ complaints of bilateral LE edema x3 days most c/w soft tissue injury, question of venosus insufficiency. Psychiatry has been consulted for agitation/aggression/confusion in the last 24 hours. Haldol 1.5mg was give this morning for extreme aggression.     Sedated, does not wake up. Discussed with RN- who is aware and stated vitals stable. Discussed with CJ Benitez as well.   Suspecting delirium at this time, as medicine team confirmed that at baseline patient is oriented x 3 at home.     04/23 - Patient is calm and cooperative, A&O x4.     Recommendations  - Coordinate care with family  - Melatonin 3mg q 8pm PO- to help with sleep wake reversal  - Patient is psychiatry cleared, contact us with any questions   The patient is a 81 year old obese (BMI 41) F with a history of delirium when hospitalized, no known prior psychiatric history, HTN, HLD, AFib on Eliquis,  DM2 on insulin w/ neuropathy, HFrEF (EF 40-45% TTE 4/2018), CAD, CKD3, OM of R 1st & 2nd toe s/p abx course presents to the hospital w/ complaints of bilateral LE edema x3 days most c/w soft tissue injury, question of venosus insufficiency. Psychiatry has been consulted for agitation/aggression/confusion in the last 24 hours. Haldol 1.5mg was give this morning for extreme aggression.     Sedated, does not wake up. Discussed with RN- who is aware and stated vitals stable. Discussed with CJ Benitez as well.   Suspecting delirium at this time, as medicine team confirmed that at baseline patient is oriented x 3 at home.     04/23 - Patient is calm and cooperative, A&O x4.     Recommendations  - Coordinate care with family  - Melatonin 3mg q 8pm PO- to help with sleep wake reversal  - Patient is psychiatry cleared. Medicine team to coordinate a safe d/c plan with family

## 2021-04-23 NOTE — PROGRESS NOTE ADULT - PROBLEM SELECTOR PLAN 1
Pt acutely delrious this am and agitated.  Not allowing medical care, not taking meds, pulling out IVs and yelling.  - psych eval as likely needs to be medicated to allow care given severity of agitation, collateral from family suggested prior episodes of delirium  - no s/s of infection that would precipitate likely 2/2 age and hospital environment  - now improved on 4/23  - advised son likely has underlying dementia and should f/u with OP neuro

## 2021-04-23 NOTE — PROGRESS NOTE ADULT - SUBJECTIVE AND OBJECTIVE BOX
Patient is a 81y old  Female who presents with a chief complaint of Bilateral lower extremity swelling    SUBJECTIVE / OVERNIGHT EVENTS:    In bed, eating breakfast, today no longer agitated, reports does not recall events yesterday  Denies pain, reports legs are better; states her birth year 1940 but does not know present year, knows hospital   No CP, SOB, f/c/n/v    No prns needed   D/w son re: likely has dementia and medically cleared, awaiting PT reassessment     MEDICATIONS  (STANDING):  acetaminophen   Tablet 650 milliGRAM(s) Oral every 8 hours  apixaban 5 milliGRAM(s) Oral two times a day  aspirin enteric coated 81 milliGRAM(s) Oral daily  atorvastatin 80 milliGRAM(s) Oral at bedtime  cilostazol 50 milliGRAM(s) Oral two times a day  gabapentin 300 milliGRAM(s) Oral three times a day  insulin glargine Injectable (LANTUS) 30 Unit(s) SubCutaneous at bedtime  insulin lispro (ADMELOG) corrective regimen sliding scale   SubCutaneous three times a day before meals  insulin lispro (ADMELOG) corrective regimen sliding scale   SubCutaneous at bedtime  insulin lispro Injectable (ADMELOG) 5 Unit(s) SubCutaneous three times a day before meals  melatonin 3 milliGRAM(s) Oral at bedtime  metoprolol succinate ER 25 milliGRAM(s) Oral daily  montelukast 10 milliGRAM(s) Oral daily    MEDICATIONS  (PRN):  haloperidol    Injectable 1 milliGRAM(s) IntraMuscular every 8 hours PRN aggression    T(C): 36.3 (04-23-21 @ 04:00), Max: 37.1 (04-22-21 @ 16:29)  HR: 84 (04-23-21 @ 04:00) (84 - 99)  BP: 124/64 (04-23-21 @ 04:00) (124/64 - 140/80)  RR: 14 (04-23-21 @ 04:00) (14 - 16)  SpO2: 100% (04-23-21 @ 04:00) (99% - 100%)    CAPILLARY BLOOD GLUCOSE  POCT Blood Glucose.: 286 mg/dL (23 Apr 2021 08:42)  POCT Blood Glucose.: 385 mg/dL (22 Apr 2021 21:40)  POCT Blood Glucose.: 89 mg/dL (22 Apr 2021 17:27)  POCT Blood Glucose.: 114 mg/dL (22 Apr 2021 12:18)    I&O's Summary    22 Apr 2021 07:01  -  23 Apr 2021 07:00  --------------------------------------------------------  IN: 500 mL / OUT: 1350 mL / NET: -850 mL    PHYSICAL EXAM:  GENERAL: NAD, obese, [place], on RA, normal WOB  HEAD:  Atraumatic, Normocephalic  EYES: EOMI, PERRLA, conjunctiva and sclera clear  NECK: Supple, No JVD  CHEST/LUNG: Clear to auscultation bilaterally; No wheeze  HEART: irregular; No murmurs, rubs, or gallops  ABDOMEN: Soft, Nontender, Nondistended; Bowel sounds present  EXTREMITIES:   warm, edema improved, small shin scab on R and abrasion on L  PSYCH: AAOx1-2  NEUROLOGY: non-focal    LABS:  pending     Microbiology: Culture Results:   <10,000 CFU/mL Normal Urogenital Stella (04-20 @ 21:43)    Notes Reviewed:  psych  Care Discussed with Consultants/Other Providers:

## 2021-04-23 NOTE — DISCHARGE NOTE PROVIDER - PROVIDER TOKENS
PROVIDER:[TOKEN:[[id number]]],FREE:[LAST:[Your Primary Care Doctor],PHONE:[(   )    -],FAX:[(   )    -]]

## 2021-04-23 NOTE — DISCHARGE NOTE PROVIDER - NSDCFUADDAPPT_GEN_ALL_CORE_FT
Follow up with your primary care doctor within one week of discharge. If you do not have one, you can call the medicine clinic at [phone] to make an appointment

## 2021-04-23 NOTE — OCCUPATIONAL THERAPY INITIAL EVALUATION ADULT - PERTINENT HX OF CURRENT PROBLEM, REHAB EVAL
81 year old female with history of HTN, HLD, AFib, DM2 w/ neuropathy, HFrEF (EF 40-45% TTE 4/2018), CAD, CKD3, OM of right 1st & 2nd toe s/p abx course presents to the hospital with complaints of bilateral LE edema x3 days most c/w soft tissue injury, question of venosus insufficiency.

## 2021-04-23 NOTE — BH CONSULTATION LIAISON PROGRESS NOTE - NSBHCHARTREVIEWVS_PSY_A_CORE FT
Vital Signs Last 24 Hrs  T(C): 36.3 (23 Apr 2021 04:00), Max: 37.1 (22 Apr 2021 16:29)  T(F): 97.3 (23 Apr 2021 04:00), Max: 98.7 (22 Apr 2021 16:29)  HR: 84 (23 Apr 2021 04:00) (84 - 99)  BP: 124/64 (23 Apr 2021 04:00) (124/64 - 140/80)  BP(mean): --  RR: 14 (23 Apr 2021 04:00) (14 - 16)  SpO2: 100% (23 Apr 2021 04:00) (99% - 100%)

## 2021-04-24 LAB
ANION GAP SERPL CALC-SCNC: 11 MMOL/L — SIGNIFICANT CHANGE UP (ref 7–14)
BUN SERPL-MCNC: 47 MG/DL — HIGH (ref 7–23)
CALCIUM SERPL-MCNC: 9.2 MG/DL — SIGNIFICANT CHANGE UP (ref 8.4–10.5)
CHLORIDE SERPL-SCNC: 101 MMOL/L — SIGNIFICANT CHANGE UP (ref 98–107)
CO2 SERPL-SCNC: 28 MMOL/L — SIGNIFICANT CHANGE UP (ref 22–31)
CREAT SERPL-MCNC: 1.61 MG/DL — HIGH (ref 0.5–1.3)
GLUCOSE BLDC GLUCOMTR-MCNC: 108 MG/DL — HIGH (ref 70–99)
GLUCOSE BLDC GLUCOMTR-MCNC: 132 MG/DL — HIGH (ref 70–99)
GLUCOSE BLDC GLUCOMTR-MCNC: 152 MG/DL — HIGH (ref 70–99)
GLUCOSE BLDC GLUCOMTR-MCNC: 242 MG/DL — HIGH (ref 70–99)
GLUCOSE SERPL-MCNC: 117 MG/DL — HIGH (ref 70–99)
HCT VFR BLD CALC: 43.6 % — SIGNIFICANT CHANGE UP (ref 34.5–45)
HGB BLD-MCNC: 13.4 G/DL — SIGNIFICANT CHANGE UP (ref 11.5–15.5)
MAGNESIUM SERPL-MCNC: 2.4 MG/DL — SIGNIFICANT CHANGE UP (ref 1.6–2.6)
MCHC RBC-ENTMCNC: 24.1 PG — LOW (ref 27–34)
MCHC RBC-ENTMCNC: 30.7 GM/DL — LOW (ref 32–36)
MCV RBC AUTO: 78.4 FL — LOW (ref 80–100)
NRBC # BLD: 0 /100 WBCS — SIGNIFICANT CHANGE UP
NRBC # FLD: 0 K/UL — SIGNIFICANT CHANGE UP
PHOSPHATE SERPL-MCNC: 3.8 MG/DL — SIGNIFICANT CHANGE UP (ref 2.5–4.5)
PLATELET # BLD AUTO: 253 K/UL — SIGNIFICANT CHANGE UP (ref 150–400)
POTASSIUM SERPL-MCNC: 4.7 MMOL/L — SIGNIFICANT CHANGE UP (ref 3.5–5.3)
POTASSIUM SERPL-SCNC: 4.7 MMOL/L — SIGNIFICANT CHANGE UP (ref 3.5–5.3)
RBC # BLD: 5.56 M/UL — HIGH (ref 3.8–5.2)
RBC # FLD: 16.6 % — HIGH (ref 10.3–14.5)
SODIUM SERPL-SCNC: 140 MMOL/L — SIGNIFICANT CHANGE UP (ref 135–145)
WBC # BLD: 5.39 K/UL — SIGNIFICANT CHANGE UP (ref 3.8–10.5)
WBC # FLD AUTO: 5.39 K/UL — SIGNIFICANT CHANGE UP (ref 3.8–10.5)

## 2021-04-24 PROCEDURE — 99232 SBSQ HOSP IP/OBS MODERATE 35: CPT

## 2021-04-24 RX ORDER — AMLODIPINE BESYLATE 2.5 MG/1
5 TABLET ORAL DAILY
Refills: 0 | Status: DISCONTINUED | OUTPATIENT
Start: 2021-04-24 | End: 2021-04-28

## 2021-04-24 RX ADMIN — GABAPENTIN 300 MILLIGRAM(S): 400 CAPSULE ORAL at 12:32

## 2021-04-24 RX ADMIN — APIXABAN 5 MILLIGRAM(S): 2.5 TABLET, FILM COATED ORAL at 05:24

## 2021-04-24 RX ADMIN — Medication 2: at 12:31

## 2021-04-24 RX ADMIN — CILOSTAZOL 50 MILLIGRAM(S): 100 TABLET ORAL at 05:24

## 2021-04-24 RX ADMIN — ATORVASTATIN CALCIUM 80 MILLIGRAM(S): 80 TABLET, FILM COATED ORAL at 22:30

## 2021-04-24 RX ADMIN — CILOSTAZOL 50 MILLIGRAM(S): 100 TABLET ORAL at 22:31

## 2021-04-24 RX ADMIN — Medication 650 MILLIGRAM(S): at 23:30

## 2021-04-24 RX ADMIN — Medication 6 UNIT(S): at 09:12

## 2021-04-24 RX ADMIN — Medication 3 MILLIGRAM(S): at 22:31

## 2021-04-24 RX ADMIN — AMLODIPINE BESYLATE 5 MILLIGRAM(S): 2.5 TABLET ORAL at 10:12

## 2021-04-24 RX ADMIN — Medication 25 MILLIGRAM(S): at 05:25

## 2021-04-24 RX ADMIN — GABAPENTIN 300 MILLIGRAM(S): 400 CAPSULE ORAL at 22:31

## 2021-04-24 RX ADMIN — MONTELUKAST 10 MILLIGRAM(S): 4 TABLET, CHEWABLE ORAL at 10:12

## 2021-04-24 RX ADMIN — Medication 1: at 18:06

## 2021-04-24 RX ADMIN — INSULIN GLARGINE 34 UNIT(S): 100 INJECTION, SOLUTION SUBCUTANEOUS at 10:25

## 2021-04-24 RX ADMIN — Medication 650 MILLIGRAM(S): at 12:31

## 2021-04-24 RX ADMIN — Medication 650 MILLIGRAM(S): at 12:32

## 2021-04-24 RX ADMIN — Medication 6 UNIT(S): at 18:07

## 2021-04-24 RX ADMIN — Medication 650 MILLIGRAM(S): at 22:30

## 2021-04-24 RX ADMIN — GABAPENTIN 300 MILLIGRAM(S): 400 CAPSULE ORAL at 05:24

## 2021-04-24 RX ADMIN — Medication 81 MILLIGRAM(S): at 10:12

## 2021-04-24 RX ADMIN — Medication 650 MILLIGRAM(S): at 05:24

## 2021-04-24 RX ADMIN — APIXABAN 5 MILLIGRAM(S): 2.5 TABLET, FILM COATED ORAL at 18:06

## 2021-04-24 RX ADMIN — Medication 6 UNIT(S): at 13:32

## 2021-04-24 NOTE — PROGRESS NOTE ADULT - SUBJECTIVE AND OBJECTIVE BOX
Dr. Olive Norton  Pager [phone]    PROGRESS NOTE:     Patient is a 81y old  Female who presents with a chief complaint of Bilateral lower extremity swelling (23 Apr 2021 18:02)      SUBJECTIVE / OVERNIGHT EVENTS: pt denies chest pain or sob   ADDITIONAL REVIEW OF SYSTEMS: afebrile     MEDICATIONS  (STANDING):  acetaminophen   Tablet .. 650 milliGRAM(s) Oral every 8 hours  apixaban 5 milliGRAM(s) Oral two times a day  aspirin enteric coated 81 milliGRAM(s) Oral daily  atorvastatin 80 milliGRAM(s) Oral at bedtime  cilostazol 50 milliGRAM(s) Oral two times a day  dextrose 40% Gel 15 Gram(s) Oral once  dextrose 5%. 1000 milliLiter(s) (50 mL/Hr) IV Continuous <Continuous>  dextrose 5%. 1000 milliLiter(s) (100 mL/Hr) IV Continuous <Continuous>  dextrose 50% Injectable 25 Gram(s) IV Push once  dextrose 50% Injectable 12.5 Gram(s) IV Push once  dextrose 50% Injectable 25 Gram(s) IV Push once  gabapentin 300 milliGRAM(s) Oral three times a day  insulin glargine Injectable (LANTUS) 34 Unit(s) SubCutaneous <User Schedule>  insulin lispro (ADMELOG) corrective regimen sliding scale   SubCutaneous three times a day before meals  insulin lispro (ADMELOG) corrective regimen sliding scale   SubCutaneous at bedtime  insulin lispro Injectable (ADMELOG) 6 Unit(s) SubCutaneous three times a day before meals  melatonin 3 milliGRAM(s) Oral at bedtime  metoprolol succinate ER 25 milliGRAM(s) Oral daily  montelukast 10 milliGRAM(s) Oral daily    MEDICATIONS  (PRN):  haloperidol    Injectable 1 milliGRAM(s) IntraMuscular every 8 hours PRN aggression      CAPILLARY BLOOD GLUCOSE      POCT Blood Glucose.: 108 mg/dL (24 Apr 2021 08:24)  POCT Blood Glucose.: 184 mg/dL (23 Apr 2021 21:49)  POCT Blood Glucose.: 122 mg/dL (23 Apr 2021 17:18)  POCT Blood Glucose.: 220 mg/dL (23 Apr 2021 12:26)    I&O's Summary    23 Apr 2021 07:01  -  24 Apr 2021 07:00  --------------------------------------------------------  IN: 300 mL / OUT: 1650 mL / NET: -1350 mL        PHYSICAL EXAM:  Vital Signs Last 24 Hrs  T(C): 36.2 (24 Apr 2021 05:21), Max: 36.7 (23 Apr 2021 17:49)  T(F): 97.2 (24 Apr 2021 05:21), Max: 98 (23 Apr 2021 17:49)  HR: 90 (24 Apr 2021 05:21) (72 - 90)  BP: 153/88 (24 Apr 2021 05:21) (145/67 - 155/87)  BP(mean): --  RR: 16 (24 Apr 2021 05:21) (16 - 16)  SpO2: 100% (24 Apr 2021 05:21) (100% - 100%)  GENERAL: NAD, obese, [place], on RA, normal WOB  HEAD:  Atraumatic, Normocephalic  EYES: EOMI,  conjunctiva and sclera clear  NECK: Supple, No JVD  CHEST/LUNG: Clear to auscultation bilaterally; No wheeze  HEART: irregular; No murmurs, rubs, or gallops  ABDOMEN: Soft, Nontender, Nondistended; Bowel sounds present  EXTREMITIES:   warm, 1+ LE edema improved, small shin scab on R and abrasion on L  PSYCH: AAOx1-2  NEUROLOGY: non-focal      LABS:                        13.4   5.39  )-----------( 253      ( 24 Apr 2021 08:00 )             43.6     04-24    140  |  101  |  47<H>  ----------------------------<  117<H>  4.7   |  28  |  1.61<H>    Ca    9.2      24 Apr 2021 08:00  Phos  3.8     04-24  Mg     2.4     04-24      RADIOLOGY & ADDITIONAL TESTS:  Results Reviewed:   Imaging Personally Reviewed:  < from: Transthoracic Echocardiogram (04.21.21 @ 15:50) >  CONCLUSIONS:  LVEF 45-50%  1. Mitral annular calcification, otherwise normal mitral  valve. Mild mitral regurgitation.  2. Moderately dilated left atrium.  LA volume index = 45  cc/m2.  3. Normal left ventricular internal dimensions and wall  thicknesses.  4. Endocardium not well visualized; grossly mild segmental  left ventricular systolic dysfunction. Possible  inferolateral wall hypokinesis.  5. The right ventricle is not well visualized; grossly  normal right ventricular systolic function.    Electrocardiogram Personally Reviewed:    COORDINATION OF CARE:  Care Discussed with Consultants/Other Providers [Y/N]:  Prior or Outpatient Records Reviewed [Y/N]:

## 2021-04-24 NOTE — PROGRESS NOTE ADULT - PROBLEM SELECTOR PLAN 6
At home on Tresiba 52U at bedtime w/ Novolog sliding scale; poor control with HbA1c of >9  - insulin regimen per endo, currently on lantus 34 units and 6 units with meals  - RD consult  - pt given syringe with insulin and was able to self-inject however given memory issues doubt can do 4x a day insulin   - appreciate endocrine recs   - c/w gabapentin for neuropathy

## 2021-04-24 NOTE — PROGRESS NOTE ADULT - PROBLEM SELECTOR PLAN 1
-Now calm and agreeable for rehab, calm since 4/23  -she was agitated on 4/22, not taking meds, pulling out IVs and yelling.  - psych eval as likely needs to be medicated to allow care given severity of agitation, collateral from family suggested prior episodes of delirium  - no s/s of infection that would precipitate likely 2/2 age and hospital environment  - advised son likely has underlying dementia and should f/u with OP neuro

## 2021-04-24 NOTE — PROGRESS NOTE ADULT - PROBLEM SELECTOR PLAN 8
- mild increase in Cr likely 2/2 diuresis, holding lasix and monitoring  - trend, renally dose meds, avoid nephrotoxins  -creat peaked at 1.77, now down to 1.61  -bp elevated, will add norvasc 5 mg qd

## 2021-04-24 NOTE — PROGRESS NOTE ADULT - PROBLEM SELECTOR PLAN 2
Reports noted x 1 wk, recent fall with trauma to the RLE.  Denies CP or SOB. X-ray neg for fracture, soft tissue swelling.  LE US negative for DVT.  UA only trace protein.  Reports does not have chronic LE edema however reliability questionable.  2018 no significant PAD of RLE.   - s/p IV lasix 40, will hold further diuretics and monitor  - edema improved, denies LE pain presently   - TTE shows no changes from 2018, LVEF 45-50%, normal RV fxn,  grossly mild segmental LV systolic dysfunction. Possible inferolateral wall hypokinesis.  - c/w pain control with Tylenol and elevate LE  - PT rec ISAAC, pt agreeable Reports noted x 1 wk, recent fall with trauma to the RLE.  Denies CP or SOB. X-ray neg for fracture, soft tissue swelling.  LE US negative for DVT.  UA only trace protein.  Reports does not have chronic LE edema however reliability questionable.  2018 no significant PAD of RLE.   - s/p IV lasix 40, will hold further diuretics and monitor  - edema improved, denies LE pain presently   - TTE shows no changes from 2018, LVEF 45-50%, normal RV fxn,  grossly mild segmental LV systolic dysfunction. Possible inferolateral wall hypokinesis.  - c/w pain control with Tylenol and elevate LE  - PT rec ISAAC, pt agreeable, medically optimized for DC to rehab  -covid pcr neg 4/20, repeat swab Sunday 4/25

## 2021-04-25 LAB
ANION GAP SERPL CALC-SCNC: 9 MMOL/L — SIGNIFICANT CHANGE UP (ref 7–14)
BUN SERPL-MCNC: 46 MG/DL — HIGH (ref 7–23)
CALCIUM SERPL-MCNC: 9.2 MG/DL — SIGNIFICANT CHANGE UP (ref 8.4–10.5)
CHLORIDE SERPL-SCNC: 101 MMOL/L — SIGNIFICANT CHANGE UP (ref 98–107)
CO2 SERPL-SCNC: 29 MMOL/L — SIGNIFICANT CHANGE UP (ref 22–31)
CREAT SERPL-MCNC: 1.65 MG/DL — HIGH (ref 0.5–1.3)
GLUCOSE BLDC GLUCOMTR-MCNC: 125 MG/DL — HIGH (ref 70–99)
GLUCOSE BLDC GLUCOMTR-MCNC: 189 MG/DL — HIGH (ref 70–99)
GLUCOSE BLDC GLUCOMTR-MCNC: 191 MG/DL — HIGH (ref 70–99)
GLUCOSE BLDC GLUCOMTR-MCNC: 227 MG/DL — HIGH (ref 70–99)
GLUCOSE SERPL-MCNC: 123 MG/DL — HIGH (ref 70–99)
HCT VFR BLD CALC: 40.5 % — SIGNIFICANT CHANGE UP (ref 34.5–45)
HGB BLD-MCNC: 12.4 G/DL — SIGNIFICANT CHANGE UP (ref 11.5–15.5)
MAGNESIUM SERPL-MCNC: 2.6 MG/DL — SIGNIFICANT CHANGE UP (ref 1.6–2.6)
MCHC RBC-ENTMCNC: 23.8 PG — LOW (ref 27–34)
MCHC RBC-ENTMCNC: 30.6 GM/DL — LOW (ref 32–36)
MCV RBC AUTO: 77.9 FL — LOW (ref 80–100)
NRBC # BLD: 0 /100 WBCS — SIGNIFICANT CHANGE UP
NRBC # FLD: 0 K/UL — SIGNIFICANT CHANGE UP
PHOSPHATE SERPL-MCNC: 3.3 MG/DL — SIGNIFICANT CHANGE UP (ref 2.5–4.5)
PLATELET # BLD AUTO: 256 K/UL — SIGNIFICANT CHANGE UP (ref 150–400)
POTASSIUM SERPL-MCNC: 4.4 MMOL/L — SIGNIFICANT CHANGE UP (ref 3.5–5.3)
POTASSIUM SERPL-SCNC: 4.4 MMOL/L — SIGNIFICANT CHANGE UP (ref 3.5–5.3)
RBC # BLD: 5.2 M/UL — SIGNIFICANT CHANGE UP (ref 3.8–5.2)
RBC # FLD: 16.6 % — HIGH (ref 10.3–14.5)
SARS-COV-2 RNA SPEC QL NAA+PROBE: SIGNIFICANT CHANGE UP
SODIUM SERPL-SCNC: 139 MMOL/L — SIGNIFICANT CHANGE UP (ref 135–145)
WBC # BLD: 5.48 K/UL — SIGNIFICANT CHANGE UP (ref 3.8–10.5)
WBC # FLD AUTO: 5.48 K/UL — SIGNIFICANT CHANGE UP (ref 3.8–10.5)

## 2021-04-25 PROCEDURE — 99232 SBSQ HOSP IP/OBS MODERATE 35: CPT

## 2021-04-25 RX ADMIN — Medication 6 UNIT(S): at 17:35

## 2021-04-25 RX ADMIN — Medication 650 MILLIGRAM(S): at 12:44

## 2021-04-25 RX ADMIN — Medication 650 MILLIGRAM(S): at 07:30

## 2021-04-25 RX ADMIN — AMLODIPINE BESYLATE 5 MILLIGRAM(S): 2.5 TABLET ORAL at 05:48

## 2021-04-25 RX ADMIN — Medication 6 UNIT(S): at 08:55

## 2021-04-25 RX ADMIN — ATORVASTATIN CALCIUM 80 MILLIGRAM(S): 80 TABLET, FILM COATED ORAL at 22:36

## 2021-04-25 RX ADMIN — Medication 2: at 17:35

## 2021-04-25 RX ADMIN — GABAPENTIN 300 MILLIGRAM(S): 400 CAPSULE ORAL at 05:48

## 2021-04-25 RX ADMIN — Medication 81 MILLIGRAM(S): at 12:44

## 2021-04-25 RX ADMIN — CILOSTAZOL 50 MILLIGRAM(S): 100 TABLET ORAL at 17:35

## 2021-04-25 RX ADMIN — Medication 650 MILLIGRAM(S): at 22:36

## 2021-04-25 RX ADMIN — GABAPENTIN 300 MILLIGRAM(S): 400 CAPSULE ORAL at 22:36

## 2021-04-25 RX ADMIN — APIXABAN 5 MILLIGRAM(S): 2.5 TABLET, FILM COATED ORAL at 05:48

## 2021-04-25 RX ADMIN — GABAPENTIN 300 MILLIGRAM(S): 400 CAPSULE ORAL at 12:44

## 2021-04-25 RX ADMIN — MONTELUKAST 10 MILLIGRAM(S): 4 TABLET, CHEWABLE ORAL at 12:44

## 2021-04-25 RX ADMIN — Medication 25 MILLIGRAM(S): at 05:48

## 2021-04-25 RX ADMIN — CILOSTAZOL 50 MILLIGRAM(S): 100 TABLET ORAL at 05:48

## 2021-04-25 RX ADMIN — Medication 6 UNIT(S): at 12:43

## 2021-04-25 RX ADMIN — Medication 650 MILLIGRAM(S): at 23:25

## 2021-04-25 RX ADMIN — Medication 3 MILLIGRAM(S): at 22:36

## 2021-04-25 RX ADMIN — Medication 1: at 12:43

## 2021-04-25 RX ADMIN — Medication 650 MILLIGRAM(S): at 05:48

## 2021-04-25 RX ADMIN — APIXABAN 5 MILLIGRAM(S): 2.5 TABLET, FILM COATED ORAL at 17:35

## 2021-04-25 RX ADMIN — INSULIN GLARGINE 34 UNIT(S): 100 INJECTION, SOLUTION SUBCUTANEOUS at 09:18

## 2021-04-25 NOTE — PROGRESS NOTE ADULT - SUBJECTIVE AND OBJECTIVE BOX
Dr. Olive Norton  Pager [phone]    PROGRESS NOTE:     Patient is a 81y old  Female who presents with a chief complaint of Bilateral lower extremity swelling (24 Apr 2021 09:07)      SUBJECTIVE / OVERNIGHT EVENTS: pt denies chest pain or sob  ADDITIONAL REVIEW OF SYSTEMS: had BM this morning, calm and pleasant    MEDICATIONS  (STANDING):  acetaminophen   Tablet .. 650 milliGRAM(s) Oral every 8 hours  amLODIPine   Tablet 5 milliGRAM(s) Oral daily  apixaban 5 milliGRAM(s) Oral two times a day  aspirin enteric coated 81 milliGRAM(s) Oral daily  atorvastatin 80 milliGRAM(s) Oral at bedtime  cilostazol 50 milliGRAM(s) Oral two times a day  dextrose 40% Gel 15 Gram(s) Oral once  dextrose 5%. 1000 milliLiter(s) (50 mL/Hr) IV Continuous <Continuous>  dextrose 5%. 1000 milliLiter(s) (100 mL/Hr) IV Continuous <Continuous>  dextrose 50% Injectable 25 Gram(s) IV Push once  dextrose 50% Injectable 12.5 Gram(s) IV Push once  dextrose 50% Injectable 25 Gram(s) IV Push once  gabapentin 300 milliGRAM(s) Oral three times a day  insulin glargine Injectable (LANTUS) 34 Unit(s) SubCutaneous <User Schedule>  insulin lispro (ADMELOG) corrective regimen sliding scale   SubCutaneous three times a day before meals  insulin lispro (ADMELOG) corrective regimen sliding scale   SubCutaneous at bedtime  insulin lispro Injectable (ADMELOG) 6 Unit(s) SubCutaneous three times a day before meals  melatonin 3 milliGRAM(s) Oral at bedtime  metoprolol succinate ER 25 milliGRAM(s) Oral daily  montelukast 10 milliGRAM(s) Oral daily    MEDICATIONS  (PRN):  haloperidol    Injectable 1 milliGRAM(s) IntraMuscular every 8 hours PRN aggression      CAPILLARY BLOOD GLUCOSE      POCT Blood Glucose.: 125 mg/dL (25 Apr 2021 08:29)  POCT Blood Glucose.: 132 mg/dL (24 Apr 2021 21:35)  POCT Blood Glucose.: 152 mg/dL (24 Apr 2021 17:06)  POCT Blood Glucose.: 242 mg/dL (24 Apr 2021 12:29)    I&O's Summary    24 Apr 2021 07:01  -  25 Apr 2021 07:00  --------------------------------------------------------  IN: 360 mL / OUT: 1150 mL / NET: -790 mL        PHYSICAL EXAM:  Vital Signs Last 24 Hrs  T(C): 36.8 (25 Apr 2021 05:47), Max: 36.8 (25 Apr 2021 05:47)  T(F): 98.2 (25 Apr 2021 05:47), Max: 98.2 (25 Apr 2021 05:47)  HR: 87 (25 Apr 2021 05:47) (75 - 87)  BP: 144/60 (25 Apr 2021 05:47) (122/67 - 144/60)  BP(mean): --  RR: 16 (25 Apr 2021 05:47) (16 - 17)  SpO2: 98% (25 Apr 2021 05:47) (98% - 100%)  GENERAL: NAD, obese, [place], on RA, normal WOB  HEAD:  Atraumatic, Normocephalic  EYES: EOMI,  conjunctiva and sclera clear  NECK: Supple, No JVD  CHEST/LUNG: Clear to auscultation bilaterally; No wheeze  HEART: irregular; No murmurs, rubs, or gallops  ABDOMEN: Soft, Nontender, Nondistended; Bowel sounds present  EXTREMITIES:   warm, 1+ LE edema improved, small shin scab on R and abrasion on L  PSYCH: AAOx2-3, calm  NEUROLOGY: non-focal      LABS:                        12.4   5.48  )-----------( 256      ( 25 Apr 2021 08:04 )             40.5     04-25    139  |  101  |  46<H>  ----------------------------<  123<H>  4.4   |  29  |  1.65<H>    Ca    9.2      25 Apr 2021 08:04  Phos  3.3     04-25  Mg     2.6     04-25      RADIOLOGY & ADDITIONAL TESTS:  Results Reviewed:   Imaging Personally Reviewed:  Electrocardiogram Personally Reviewed:    COORDINATION OF CARE:  Care Discussed with Consultants/Other Providers [Y/N]:  Prior or Outpatient Records Reviewed [Y/N]:

## 2021-04-25 NOTE — PROGRESS NOTE ADULT - PROBLEM SELECTOR PLAN 5
- c/w metoprolol  - DASH diet - c/w metoprolol, norvasc 5mg qd added for optimize bp control, cont to monitor  - DASH diet

## 2021-04-25 NOTE — PROGRESS NOTE ADULT - PROBLEM SELECTOR PLAN 8
- mild increase in Cr likely 2/2 diuresis, holding lasix and monitoring  - trend, renally dose meds, avoid nephrotoxins  -creat peaked at 1.77, now down to 1.65  -bp elevated, will add norvasc 5 mg qd - mild increase in Cr likely 2/2 diuresis, holding lasix and monitoring  - trend, renally dose meds, avoid nephrotoxins  -creat peaked at 1.77, now down to 1.65

## 2021-04-25 NOTE — PROGRESS NOTE ADULT - PROBLEM SELECTOR PLAN 2
Reports noted x 1 wk, recent fall with trauma to the RLE.  Denies CP or SOB. X-ray neg for fracture, soft tissue swelling.  LE US negative for DVT.  UA only trace protein.  Reports does not have chronic LE edema however reliability questionable.  2018 no significant PAD of RLE.   - s/p IV lasix 40, will hold further diuretics and monitor  - edema improved, denies LE pain presently   - TTE shows no changes from 2018, LVEF 45-50%, normal RV fxn,  grossly mild segmental LV systolic dysfunction. Possible inferolateral wall hypokinesis.  - c/w pain control with Tylenol and elevate LE  - PT rec ISAAC, pt agreeable, medically optimized for DC to rehab  -covid pcr neg 4/20, repeat swab Sunday 4/25

## 2021-04-26 LAB
ANION GAP SERPL CALC-SCNC: 8 MMOL/L — SIGNIFICANT CHANGE UP (ref 7–14)
BUN SERPL-MCNC: 43 MG/DL — HIGH (ref 7–23)
CALCIUM SERPL-MCNC: 9.3 MG/DL — SIGNIFICANT CHANGE UP (ref 8.4–10.5)
CHLORIDE SERPL-SCNC: 100 MMOL/L — SIGNIFICANT CHANGE UP (ref 98–107)
CO2 SERPL-SCNC: 29 MMOL/L — SIGNIFICANT CHANGE UP (ref 22–31)
CREAT SERPL-MCNC: 1.47 MG/DL — HIGH (ref 0.5–1.3)
GLUCOSE BLDC GLUCOMTR-MCNC: 119 MG/DL — HIGH (ref 70–99)
GLUCOSE BLDC GLUCOMTR-MCNC: 161 MG/DL — HIGH (ref 70–99)
GLUCOSE BLDC GLUCOMTR-MCNC: 179 MG/DL — HIGH (ref 70–99)
GLUCOSE BLDC GLUCOMTR-MCNC: 95 MG/DL — SIGNIFICANT CHANGE UP (ref 70–99)
GLUCOSE SERPL-MCNC: 122 MG/DL — HIGH (ref 70–99)
HCT VFR BLD CALC: 43.5 % — SIGNIFICANT CHANGE UP (ref 34.5–45)
HGB BLD-MCNC: 13.3 G/DL — SIGNIFICANT CHANGE UP (ref 11.5–15.5)
MAGNESIUM SERPL-MCNC: 2.5 MG/DL — SIGNIFICANT CHANGE UP (ref 1.6–2.6)
MCHC RBC-ENTMCNC: 23.9 PG — LOW (ref 27–34)
MCHC RBC-ENTMCNC: 30.6 GM/DL — LOW (ref 32–36)
MCV RBC AUTO: 78.1 FL — LOW (ref 80–100)
NRBC # BLD: 0 /100 WBCS — SIGNIFICANT CHANGE UP
NRBC # FLD: 0 K/UL — SIGNIFICANT CHANGE UP
PHOSPHATE SERPL-MCNC: 3.1 MG/DL — SIGNIFICANT CHANGE UP (ref 2.5–4.5)
PLATELET # BLD AUTO: 247 K/UL — SIGNIFICANT CHANGE UP (ref 150–400)
POTASSIUM SERPL-MCNC: 4.5 MMOL/L — SIGNIFICANT CHANGE UP (ref 3.5–5.3)
POTASSIUM SERPL-SCNC: 4.5 MMOL/L — SIGNIFICANT CHANGE UP (ref 3.5–5.3)
RBC # BLD: 5.57 M/UL — HIGH (ref 3.8–5.2)
RBC # FLD: 16.6 % — HIGH (ref 10.3–14.5)
SODIUM SERPL-SCNC: 137 MMOL/L — SIGNIFICANT CHANGE UP (ref 135–145)
WBC # BLD: 5.06 K/UL — SIGNIFICANT CHANGE UP (ref 3.8–10.5)
WBC # FLD AUTO: 5.06 K/UL — SIGNIFICANT CHANGE UP (ref 3.8–10.5)

## 2021-04-26 PROCEDURE — 99232 SBSQ HOSP IP/OBS MODERATE 35: CPT

## 2021-04-26 RX ADMIN — GABAPENTIN 300 MILLIGRAM(S): 400 CAPSULE ORAL at 21:33

## 2021-04-26 RX ADMIN — Medication 6 UNIT(S): at 09:04

## 2021-04-26 RX ADMIN — Medication 81 MILLIGRAM(S): at 09:07

## 2021-04-26 RX ADMIN — Medication 1: at 17:28

## 2021-04-26 RX ADMIN — Medication 1: at 12:42

## 2021-04-26 RX ADMIN — Medication 650 MILLIGRAM(S): at 12:43

## 2021-04-26 RX ADMIN — GABAPENTIN 300 MILLIGRAM(S): 400 CAPSULE ORAL at 05:26

## 2021-04-26 RX ADMIN — MONTELUKAST 10 MILLIGRAM(S): 4 TABLET, CHEWABLE ORAL at 09:03

## 2021-04-26 RX ADMIN — GABAPENTIN 300 MILLIGRAM(S): 400 CAPSULE ORAL at 12:43

## 2021-04-26 RX ADMIN — APIXABAN 5 MILLIGRAM(S): 2.5 TABLET, FILM COATED ORAL at 05:26

## 2021-04-26 RX ADMIN — INSULIN GLARGINE 34 UNIT(S): 100 INJECTION, SOLUTION SUBCUTANEOUS at 09:03

## 2021-04-26 RX ADMIN — ATORVASTATIN CALCIUM 80 MILLIGRAM(S): 80 TABLET, FILM COATED ORAL at 21:33

## 2021-04-26 RX ADMIN — Medication 650 MILLIGRAM(S): at 23:00

## 2021-04-26 RX ADMIN — Medication 650 MILLIGRAM(S): at 05:27

## 2021-04-26 RX ADMIN — Medication 3 MILLIGRAM(S): at 21:33

## 2021-04-26 RX ADMIN — Medication 6 UNIT(S): at 17:28

## 2021-04-26 RX ADMIN — Medication 650 MILLIGRAM(S): at 12:44

## 2021-04-26 RX ADMIN — Medication 6 UNIT(S): at 12:43

## 2021-04-26 RX ADMIN — AMLODIPINE BESYLATE 5 MILLIGRAM(S): 2.5 TABLET ORAL at 05:27

## 2021-04-26 RX ADMIN — Medication 650 MILLIGRAM(S): at 21:33

## 2021-04-26 RX ADMIN — Medication 25 MILLIGRAM(S): at 05:27

## 2021-04-26 RX ADMIN — Medication 650 MILLIGRAM(S): at 06:27

## 2021-04-26 RX ADMIN — CILOSTAZOL 50 MILLIGRAM(S): 100 TABLET ORAL at 17:28

## 2021-04-26 RX ADMIN — CILOSTAZOL 50 MILLIGRAM(S): 100 TABLET ORAL at 05:27

## 2021-04-26 RX ADMIN — APIXABAN 5 MILLIGRAM(S): 2.5 TABLET, FILM COATED ORAL at 17:28

## 2021-04-26 NOTE — PROGRESS NOTE ADULT - PROBLEM SELECTOR PLAN 8
- mild increase in Cr likely 2/2 diuresis, holding lasix and monitoring  - trend, renally dose meds, avoid nephrotoxins  -creat peaked at 1.77, now down to 1.47

## 2021-04-26 NOTE — PROGRESS NOTE ADULT - PROBLEM SELECTOR PLAN 1
-Now calm and agreeable for rehab, calm since 4/23  -she was agitated on 4/22, not taking meds, pulling out IVs and yelling.  - psych eval as likely needs to be medicated to allow care given severity of agitation, collateral from family suggested prior episodes of delirium  - no s/s of infection that would precipitate likely 2/2 age and hospital environment  - advised son likely has underlying dementia and should f/u with OP neuro  - family does not wish for rehab at this time and would prefer d/c home with aides

## 2021-04-26 NOTE — PROGRESS NOTE ADULT - SUBJECTIVE AND OBJECTIVE BOX
[facility] Division of Hospital Medicine  Dara Barros MD  Pager (M-F, 8A-5P): [phone]  Other Times:  [phone]    Patient is a 81y old  Female who presents with a chief complaint of Bilateral lower extremity swelling (25 Apr 2021 11:16)    SUBJECTIVE / OVERNIGHT EVENTS:  appears comfortable in bed.     MEDICATIONS  (STANDING):  acetaminophen   Tablet .. 650 milliGRAM(s) Oral every 8 hours  amLODIPine   Tablet 5 milliGRAM(s) Oral daily  apixaban 5 milliGRAM(s) Oral two times a day  aspirin enteric coated 81 milliGRAM(s) Oral daily  atorvastatin 80 milliGRAM(s) Oral at bedtime  cilostazol 50 milliGRAM(s) Oral two times a day  dextrose 40% Gel 15 Gram(s) Oral once  dextrose 5%. 1000 milliLiter(s) (50 mL/Hr) IV Continuous <Continuous>  dextrose 5%. 1000 milliLiter(s) (100 mL/Hr) IV Continuous <Continuous>  dextrose 50% Injectable 25 Gram(s) IV Push once  dextrose 50% Injectable 12.5 Gram(s) IV Push once  dextrose 50% Injectable 25 Gram(s) IV Push once  gabapentin 300 milliGRAM(s) Oral three times a day  insulin glargine Injectable (LANTUS) 34 Unit(s) SubCutaneous <User Schedule>  insulin lispro (ADMELOG) corrective regimen sliding scale   SubCutaneous three times a day before meals  insulin lispro (ADMELOG) corrective regimen sliding scale   SubCutaneous at bedtime  insulin lispro Injectable (ADMELOG) 6 Unit(s) SubCutaneous three times a day before meals  melatonin 3 milliGRAM(s) Oral at bedtime  metoprolol succinate ER 25 milliGRAM(s) Oral daily  montelukast 10 milliGRAM(s) Oral daily    MEDICATIONS  (PRN):  haloperidol    Injectable 1 milliGRAM(s) IntraMuscular every 8 hours PRN aggression      CAPILLARY BLOOD GLUCOSE      POCT Blood Glucose.: 179 mg/dL (26 Apr 2021 12:04)  POCT Blood Glucose.: 119 mg/dL (26 Apr 2021 08:24)  POCT Blood Glucose.: 189 mg/dL (25 Apr 2021 21:34)  POCT Blood Glucose.: 227 mg/dL (25 Apr 2021 17:12)    I&O's Summary      PHYSICAL EXAM:  Vital Signs Last 24 Hrs  T(C): 36.4 (26 Apr 2021 09:12), Max: 36.8 (25 Apr 2021 22:37)  T(F): 97.6 (26 Apr 2021 09:12), Max: 98.2 (25 Apr 2021 22:37)  HR: 74 (26 Apr 2021 09:12) (66 - 82)  BP: 144/79 (26 Apr 2021 09:12) (135/88 - 147/90)  BP(mean): --  RR: 17 (26 Apr 2021 09:12) (16 - 17)  SpO2: 97% (26 Apr 2021 09:12) (97% - 100%)    CONSTITUTIONAL: NAD  EYES: EOMI;  ENMT: Moist oral mucosa  RESPIRATORY: Normal respiratory effort; lungs are clear to auscultation bilaterally  CARDIOVASCULAR: Regular rate and rhythm, normal S1 and S2, no murmur; trace pedal edema   ABDOMEN: Nontender to palpation, normoactive bowel sounds, no rebound/guarding  MUSCULOSKELETAL:   no joint swelling or tenderness to palpation  PSYCH: alert and calm   NEUROLOGY: CN 2-12 are intact and symmetric; no gross sensory deficits   SKIN: No rashes; no palpable lesions    LABS:                        13.3   5.06  )-----------( 247      ( 26 Apr 2021 06:31 )             43.5     04-26    137  |  100  |  43<H>  ----------------------------<  122<H>  4.5   |  29  |  1.47<H>    Ca    9.3      26 Apr 2021 06:31  Phos  3.1     04-26  Mg     2.5     04-26        RADIOLOGY & ADDITIONAL TESTS:  Results Reviewed:   Imaging Personally Reviewed:  Electrocardiogram Personally Reviewed:    COORDINATION OF CARE:  Care Discussed with Consultants/Other Providers [Y/N]: Y  Prior or Outpatient Records Reviewed [Y/N]: Y    Dede: jose

## 2021-04-26 NOTE — PROGRESS NOTE ADULT - SUBJECTIVE AND OBJECTIVE BOX
Chief Complaint: B/l LE swelling w/ Endocrine c/s for diabetes management     History:  patient able to return demonstrate pens.  As per primary team, patients son will hire 24 h home health aid for 4 days then patient will move in with another sibling outisde of [place]      MEDICATIONS  (STANDING):  acetaminophen   Tablet .. 650 milliGRAM(s) Oral every 8 hours  apixaban 5 milliGRAM(s) Oral two times a day  aspirin enteric coated 81 milliGRAM(s) Oral daily  atorvastatin 80 milliGRAM(s) Oral at bedtime  cilostazol 50 milliGRAM(s) Oral two times a day  dextrose 40% Gel 15 Gram(s) Oral once  dextrose 5%. 1000 milliLiter(s) (50 mL/Hr) IV Continuous <Continuous>  dextrose 5%. 1000 milliLiter(s) (100 mL/Hr) IV Continuous <Continuous>  dextrose 50% Injectable 25 Gram(s) IV Push once  dextrose 50% Injectable 12.5 Gram(s) IV Push once  dextrose 50% Injectable 25 Gram(s) IV Push once  gabapentin 300 milliGRAM(s) Oral three times a day  insulin glargine Injectable (LANTUS) 30 Unit(s) SubCutaneous at bedtime  insulin lispro (ADMELOG) corrective regimen sliding scale   SubCutaneous three times a day before meals  insulin lispro (ADMELOG) corrective regimen sliding scale   SubCutaneous at bedtime  insulin lispro Injectable (ADMELOG) 5 Unit(s) SubCutaneous three times a day before meals  metoprolol succinate ER 25 milliGRAM(s) Oral daily  montelukast 10 milliGRAM(s) Oral daily      Allergies  No Known Allergies    Review of Systems:  UNABLE TO OBTAIN AS PT WAS SEDATED S/P HALDOL ADMINISTRATION      Vital Signs Last 24 Hrs  T(C): 36.4 (26 Apr 2021 09:12), Max: 36.8 (25 Apr 2021 22:37)  T(F): 97.6 (26 Apr 2021 09:12), Max: 98.2 (25 Apr 2021 22:37)  HR: 74 (26 Apr 2021 09:12) (66 - 82)  BP: 144/79 (26 Apr 2021 09:12) (135/88 - 147/90)  BP(mean): --  RR: 17 (26 Apr 2021 09:12) (16 - 17)  SpO2: 97% (26 Apr 2021 09:12) (97% - 100%)  PHYSICAL EXAM:  General: Sedated, lying in bed   Heart: Irregularly regular.   Lungs: Nonlabored breathing. CTAB.   Extremities: 2+ b/l pitting ededma     CAPILLARY BLOOD GLUCOSE    POCT Blood Glucose.: 179 mg/dL (26 Apr 2021 12:04)  POCT Blood Glucose.: 119 mg/dL (26 Apr 2021 08:24)  POCT Blood Glucose.: 189 mg/dL (25 Apr 2021 21:34)  POCT Blood Glucose.: 227 mg/dL (25 Apr 2021 17:12)    POCT Blood Glucose.: 220 mg/dL (23 Apr 2021 12:26)  POCT Blood Glucose.: 286 mg/dL (23 Apr 2021 08:42)  POCT Blood Glucose.: 385 mg/dL (22 Apr 2021 21:40)  POCT Blood Glucose.: 89 mg/dL (22 Apr 2021 17:27)    POCT Blood Glucose.: 145 mg/dL (22 Apr 2021 08:58)  POCT Blood Glucose.: 145 mg/dL (21 Apr 2021 21:20)  POCT Blood Glucose.: 189 mg/dL (21 Apr 2021 17:47)  POCT Blood Glucose.: 233 mg/dL (21 Apr 2021 12:57)        04-26    137  |  100  |  43<H>  ----------------------------<  122<H>  4.5   |  29  |  1.47<H>    Ca    9.3      26 Apr 2021 06:31  Phos  3.1     04-26  Mg     2.5     04-26        A1C with Estimated Average Glucose Result: 9.2 % (04-21-21 @ 06:23)  A1C with Estimated Average Glucose Result: 8.8 % (07-29-20 @ 11:45)      Thyroid Function Tests:  04-21 @ 06:23 TSH 1.36 FreeT4 -- T3 -- Anti TPO -- Anti Thyroglobulin Ab -- TSI --

## 2021-04-26 NOTE — PROGRESS NOTE ADULT - ASSESSMENT
82 y/o F w PMHx of HTN, HLD, AFib on Eliquis, IDDM w/ neuropathy, sHFrEF (EF 40-45% TTE 4/2018), CAD s/p cardiac cath 4/2018 @ [facility] (pLAD 20% ISR, oD2 60%, dOM3 small with 99% ISR but supplied by collaterals from OM2, mRCA 30% with plan for aggressive medical management), osteomyelitis of R 1st & 2nd toe with gangrene 4/2018 s/p toenail removal & abx course presents to the hospital w/ complaints of bilateral LE edema x3 days, w/ endocrine c/s for diabetes management after pt's HgbA1c was found to be 9.2% on admission.

## 2021-04-26 NOTE — PROGRESS NOTE ADULT - PROBLEM SELECTOR PLAN 3
Pt noted to be on rosuvastatin 20 mg qd and zetia 10 mg qd at home. Lipid profile within goal.    c/w atorvastatin as ordered for now

## 2021-04-26 NOTE — PROGRESS NOTE ADULT - PROBLEM SELECTOR PLAN 1
target bg 100-180 mg/dl  - continue Lantus 34 units SQ q 0800  - continue Admelog to 6 units qac TID.  - continue low dose correctional sliding scale qac and qhs.   DC PLANS: .   - Plan is for patient to have 24 hour care until patient moves to [facility] with other son full time.  Therefore, since patient adequately demonstrated insulin pen use, patient would need reminders to take insulin.  For now, would recommend tresiba every AM.  Also will start novolog premeal TID AC.  Final doses TBD

## 2021-04-27 DIAGNOSIS — R41.89 OTHER SYMPTOMS AND SIGNS INVOLVING COGNITIVE FUNCTIONS AND AWARENESS: ICD-10-CM

## 2021-04-27 LAB
ALBUMIN SERPL ELPH-MCNC: 4 G/DL — SIGNIFICANT CHANGE UP (ref 3.3–5)
ALP SERPL-CCNC: 108 U/L — SIGNIFICANT CHANGE UP (ref 40–120)
ALT FLD-CCNC: 28 U/L — SIGNIFICANT CHANGE UP (ref 4–33)
ANION GAP SERPL CALC-SCNC: 10 MMOL/L — SIGNIFICANT CHANGE UP (ref 7–14)
APTT BLD: 46.8 SEC — HIGH (ref 27–36.3)
AST SERPL-CCNC: 35 U/L — HIGH (ref 4–32)
BASOPHILS # BLD AUTO: 0.04 K/UL — SIGNIFICANT CHANGE UP (ref 0–0.2)
BASOPHILS NFR BLD AUTO: 0.7 % — SIGNIFICANT CHANGE UP (ref 0–2)
BILIRUB SERPL-MCNC: 0.7 MG/DL — SIGNIFICANT CHANGE UP (ref 0.2–1.2)
BUN SERPL-MCNC: 40 MG/DL — HIGH (ref 7–23)
CALCIUM SERPL-MCNC: 10.1 MG/DL — SIGNIFICANT CHANGE UP (ref 8.4–10.5)
CHLORIDE SERPL-SCNC: 102 MMOL/L — SIGNIFICANT CHANGE UP (ref 98–107)
CO2 SERPL-SCNC: 26 MMOL/L — SIGNIFICANT CHANGE UP (ref 22–31)
CREAT SERPL-MCNC: 1.43 MG/DL — HIGH (ref 0.5–1.3)
EOSINOPHIL # BLD AUTO: 0.17 K/UL — SIGNIFICANT CHANGE UP (ref 0–0.5)
EOSINOPHIL NFR BLD AUTO: 2.9 % — SIGNIFICANT CHANGE UP (ref 0–6)
GLUCOSE BLDC GLUCOMTR-MCNC: 103 MG/DL — HIGH (ref 70–99)
GLUCOSE BLDC GLUCOMTR-MCNC: 112 MG/DL — HIGH (ref 70–99)
GLUCOSE BLDC GLUCOMTR-MCNC: 128 MG/DL — HIGH (ref 70–99)
GLUCOSE BLDC GLUCOMTR-MCNC: 154 MG/DL — HIGH (ref 70–99)
GLUCOSE BLDC GLUCOMTR-MCNC: 269 MG/DL — HIGH (ref 70–99)
GLUCOSE SERPL-MCNC: 114 MG/DL — HIGH (ref 70–99)
HCT VFR BLD CALC: 46.8 % — HIGH (ref 34.5–45)
HGB BLD-MCNC: 14.3 G/DL — SIGNIFICANT CHANGE UP (ref 11.5–15.5)
IANC: 2.23 K/UL — SIGNIFICANT CHANGE UP (ref 1.5–8.5)
IMM GRANULOCYTES NFR BLD AUTO: 0.2 % — SIGNIFICANT CHANGE UP (ref 0–1.5)
INR BLD: 1.44 RATIO — HIGH (ref 0.88–1.16)
LYMPHOCYTES # BLD AUTO: 2.81 K/UL — SIGNIFICANT CHANGE UP (ref 1–3.3)
LYMPHOCYTES # BLD AUTO: 48 % — HIGH (ref 13–44)
MAGNESIUM SERPL-MCNC: 2.4 MG/DL — SIGNIFICANT CHANGE UP (ref 1.6–2.6)
MCHC RBC-ENTMCNC: 23.6 PG — LOW (ref 27–34)
MCHC RBC-ENTMCNC: 30.6 GM/DL — LOW (ref 32–36)
MCV RBC AUTO: 77.4 FL — LOW (ref 80–100)
MONOCYTES # BLD AUTO: 0.59 K/UL — SIGNIFICANT CHANGE UP (ref 0–0.9)
MONOCYTES NFR BLD AUTO: 10.1 % — SIGNIFICANT CHANGE UP (ref 2–14)
NEUTROPHILS # BLD AUTO: 2.23 K/UL — SIGNIFICANT CHANGE UP (ref 1.8–7.4)
NEUTROPHILS NFR BLD AUTO: 38.1 % — LOW (ref 43–77)
NRBC # BLD: 0 /100 WBCS — SIGNIFICANT CHANGE UP
NRBC # FLD: 0 K/UL — SIGNIFICANT CHANGE UP
PHOSPHATE SERPL-MCNC: 2.7 MG/DL — SIGNIFICANT CHANGE UP (ref 2.5–4.5)
PLATELET # BLD AUTO: 257 K/UL — SIGNIFICANT CHANGE UP (ref 150–400)
POTASSIUM SERPL-MCNC: 4.5 MMOL/L — SIGNIFICANT CHANGE UP (ref 3.5–5.3)
POTASSIUM SERPL-SCNC: 4.5 MMOL/L — SIGNIFICANT CHANGE UP (ref 3.5–5.3)
PROT SERPL-MCNC: 8.7 G/DL — HIGH (ref 6–8.3)
PROTHROM AB SERPL-ACNC: 16.3 SEC — HIGH (ref 10.6–13.6)
RBC # BLD: 6.05 M/UL — HIGH (ref 3.8–5.2)
RBC # FLD: 17.7 % — HIGH (ref 10.3–14.5)
SODIUM SERPL-SCNC: 138 MMOL/L — SIGNIFICANT CHANGE UP (ref 135–145)
WBC # BLD: 5.85 K/UL — SIGNIFICANT CHANGE UP (ref 3.8–10.5)
WBC # FLD AUTO: 5.85 K/UL — SIGNIFICANT CHANGE UP (ref 3.8–10.5)

## 2021-04-27 PROCEDURE — 99233 SBSQ HOSP IP/OBS HIGH 50: CPT

## 2021-04-27 RX ORDER — OLANZAPINE 15 MG/1
1.5 TABLET, FILM COATED ORAL ONCE
Refills: 0 | Status: COMPLETED | OUTPATIENT
Start: 2021-04-27 | End: 2021-04-27

## 2021-04-27 RX ADMIN — OLANZAPINE 1.5 MILLIGRAM(S): 15 TABLET, FILM COATED ORAL at 06:30

## 2021-04-27 RX ADMIN — MONTELUKAST 10 MILLIGRAM(S): 4 TABLET, CHEWABLE ORAL at 11:38

## 2021-04-27 RX ADMIN — APIXABAN 5 MILLIGRAM(S): 2.5 TABLET, FILM COATED ORAL at 09:10

## 2021-04-27 RX ADMIN — APIXABAN 5 MILLIGRAM(S): 2.5 TABLET, FILM COATED ORAL at 22:33

## 2021-04-27 RX ADMIN — Medication 650 MILLIGRAM(S): at 09:10

## 2021-04-27 RX ADMIN — Medication 6 UNIT(S): at 12:27

## 2021-04-27 RX ADMIN — Medication 6 UNIT(S): at 17:42

## 2021-04-27 RX ADMIN — Medication 3: at 17:42

## 2021-04-27 RX ADMIN — INSULIN GLARGINE 34 UNIT(S): 100 INJECTION, SOLUTION SUBCUTANEOUS at 09:11

## 2021-04-27 RX ADMIN — CILOSTAZOL 50 MILLIGRAM(S): 100 TABLET ORAL at 09:10

## 2021-04-27 RX ADMIN — Medication 6 UNIT(S): at 09:11

## 2021-04-27 RX ADMIN — Medication 650 MILLIGRAM(S): at 17:42

## 2021-04-27 RX ADMIN — GABAPENTIN 300 MILLIGRAM(S): 400 CAPSULE ORAL at 22:34

## 2021-04-27 RX ADMIN — CILOSTAZOL 50 MILLIGRAM(S): 100 TABLET ORAL at 22:34

## 2021-04-27 RX ADMIN — Medication 25 MILLIGRAM(S): at 09:10

## 2021-04-27 RX ADMIN — HALOPERIDOL DECANOATE 1 MILLIGRAM(S): 100 INJECTION INTRAMUSCULAR at 21:55

## 2021-04-27 RX ADMIN — GABAPENTIN 300 MILLIGRAM(S): 400 CAPSULE ORAL at 09:10

## 2021-04-27 RX ADMIN — Medication 3 MILLIGRAM(S): at 22:35

## 2021-04-27 RX ADMIN — Medication 81 MILLIGRAM(S): at 11:39

## 2021-04-27 RX ADMIN — GABAPENTIN 300 MILLIGRAM(S): 400 CAPSULE ORAL at 17:42

## 2021-04-27 RX ADMIN — Medication 1: at 12:27

## 2021-04-27 RX ADMIN — AMLODIPINE BESYLATE 5 MILLIGRAM(S): 2.5 TABLET ORAL at 09:10

## 2021-04-27 NOTE — PROGRESS NOTE ADULT - SUBJECTIVE AND OBJECTIVE BOX
[facility] Division of Hospital Medicine  Dara Barros MD  Pager (M-F, 8A-5P): [phone]  Other Times:  [phone]    Patient is a 81y old  Female who presents with a chief complaint of Bilateral lower extremity swelling (26 Apr 2021 16:44)      SUBJECTIVE / OVERNIGHT EVENTS:  pt mentating at baseline.      MEDICATIONS  (STANDING):  acetaminophen   Tablet .. 650 milliGRAM(s) Oral every 8 hours  amLODIPine   Tablet 5 milliGRAM(s) Oral daily  apixaban 5 milliGRAM(s) Oral two times a day  aspirin enteric coated 81 milliGRAM(s) Oral daily  atorvastatin 80 milliGRAM(s) Oral at bedtime  cilostazol 50 milliGRAM(s) Oral two times a day  dextrose 40% Gel 15 Gram(s) Oral once  dextrose 5%. 1000 milliLiter(s) (50 mL/Hr) IV Continuous <Continuous>  dextrose 5%. 1000 milliLiter(s) (100 mL/Hr) IV Continuous <Continuous>  dextrose 50% Injectable 25 Gram(s) IV Push once  dextrose 50% Injectable 12.5 Gram(s) IV Push once  dextrose 50% Injectable 25 Gram(s) IV Push once  gabapentin 300 milliGRAM(s) Oral three times a day  insulin glargine Injectable (LANTUS) 34 Unit(s) SubCutaneous <User Schedule>  insulin lispro (ADMELOG) corrective regimen sliding scale   SubCutaneous three times a day before meals  insulin lispro (ADMELOG) corrective regimen sliding scale   SubCutaneous at bedtime  insulin lispro Injectable (ADMELOG) 6 Unit(s) SubCutaneous three times a day before meals  melatonin 3 milliGRAM(s) Oral at bedtime  metoprolol succinate ER 25 milliGRAM(s) Oral daily  montelukast 10 milliGRAM(s) Oral daily    MEDICATIONS  (PRN):  haloperidol    Injectable 1 milliGRAM(s) IntraMuscular every 8 hours PRN aggression      CAPILLARY BLOOD GLUCOSE      POCT Blood Glucose.: 112 mg/dL (27 Apr 2021 08:28)  POCT Blood Glucose.: 103 mg/dL (27 Apr 2021 05:29)  POCT Blood Glucose.: 95 mg/dL (26 Apr 2021 21:15)  POCT Blood Glucose.: 161 mg/dL (26 Apr 2021 17:26)  POCT Blood Glucose.: 179 mg/dL (26 Apr 2021 12:04)    I&O's Summary    26 Apr 2021 07:01  -  27 Apr 2021 07:00  --------------------------------------------------------  IN: 240 mL / OUT: 225 mL / NET: 15 mL    27 Apr 2021 07:01  -  27 Apr 2021 11:54  --------------------------------------------------------  IN: 240 mL / OUT: 400 mL / NET: -160 mL        PHYSICAL EXAM:  Vital Signs Last 24 Hrs  T(C): 36.9 (27 Apr 2021 11:00), Max: 36.9 (27 Apr 2021 11:00)  T(F): 98.4 (27 Apr 2021 11:00), Max: 98.4 (27 Apr 2021 11:00)  HR: 97 (27 Apr 2021 11:31) (61 - 112)  BP: 151/63 (27 Apr 2021 11:31) (128/72 - 196/91)  BP(mean): --  RR: 17 (27 Apr 2021 11:31) (17 - 18)  SpO2: 97% (27 Apr 2021 11:31) (97% - 100%)    CONSTITUTIONAL: NAD  EYES: EOMI;  ENMT: Moist oral mucosa  RESPIRATORY: Normal respiratory effort; lungs are clear to auscultation bilaterally  CARDIOVASCULAR: Regular rate and rhythm, normal S1 and S2, no murmur; trace pedal edema   ABDOMEN: Nontender to palpation, normoactive bowel sounds, no rebound/guarding  MUSCULOSKELETAL:   no joint swelling or tenderness to palpation  PSYCH: alert and calm   NEUROLOGY: CN 2-12 are intact and symmetric; no gross sensory deficits   SKIN: No rashes; no palpable lesions    LABS:                        14.3   5.85  )-----------( 257      ( 27 Apr 2021 06:12 )             46.8     04-27    138  |  102  |  40<H>  ----------------------------<  114<H>  4.5   |  26  |  1.43<H>    Ca    10.1      27 Apr 2021 06:12  Phos  2.7     04-27  Mg     2.4     04-27    TPro  8.7<H>  /  Alb  4.0  /  TBili  0.7  /  DBili  x   /  AST  35<H>  /  ALT  28  /  AlkPhos  108  04-27    PT/INR - ( 27 Apr 2021 06:12 )   PT: 16.3 sec;   INR: 1.44 ratio         PTT - ( 27 Apr 2021 06:12 )  PTT:46.8 sec          RADIOLOGY & ADDITIONAL TESTS:  Results Reviewed:   Imaging Personally Reviewed:  Electrocardiogram Personally Reviewed:    COORDINATION OF CARE:  Care Discussed with Consultants/Other Providers [Y/N]: Y  Prior or Outpatient Records Reviewed [Y/N]: Y

## 2021-04-27 NOTE — RAPID RESPONSE TEAM SUMMARY - NSSITUATIONBACKGROUNDRRT_GEN_ALL_CORE
81 obese (BMI 41) F with HTN, HLD, AFib on Eliquis,  DM2 on insulin w/ neuropathy, HFrEF (EF 40-45% TTE 4/2018), CAD, CKD3, OM of R 1st & 2nd toe s/p abx course presents to the hospital w/ complaints of bilateral LE edema x3 days most c/w soft tissue injury, question of venosus insufficiency. RRT called for AMS. On arrival, the patient was tachycardiac to 180s in AFib with RVR. SBP 170s. FSG 89 mg/dL. Patient afebrile. ECG with AFib with abberancy. Patient was treated with lopressor 5 mg x1 and responded to HR 80s-110s. She remained hypertensive. She was barely responsive and barely responding to commands. Patient was protecting her airway. CTH was ordered and a Code Stroke was called. The patient was taken down to CT scan where she began to awake and yell erratically as though she was being attacked. She continued to yell and was afraid she was being attacked. She did not complete the CT scan and was taken back to her room. She was to be treated with zyprexa due to agitation.

## 2021-04-27 NOTE — CHART NOTE - NSCHARTNOTEFT_GEN_A_CORE
Notified by RN that patient lethargic. Provider at bedside. PT found to be minimally responsive to sternal rub- RRT called for AMS. Patient HTN to 198/101 w/ A fib Rvr to 140-160s. Patient given Lopressor 5mg IV x1. A fib rate now 100-110. Code stroke called for AMS. En route to [facility] patient awoke and became combative and screaming profanities and prayers. Neuro present- code stroke canceled. Patient returned to room, Zyprexa 1.5mg IM given. Patient observed and began to calm down. RN to reasses patient once calm to vitals and morning medications. Continue to monitor closely on tele.      Yanira Seay PA-C  Department of Medicine  [facility]   In House Pager #[phone]

## 2021-04-27 NOTE — CHART NOTE - NSCHARTNOTEFT_GEN_A_CORE
80 y/o F w PMHx of HTN, HLD, AFib on Eliquis, IDDM w/ neuropathy, sHFrEF (EF 40-45% TTE 4/2018), CAD s/p cardiac cath 4/2018 @ [facility] (pLAD 20% ISR, oD2 60%, dOM3 small with 99% ISR but supplied by collaterals from OM2, mRCA 30% with plan for aggressive medical management), osteomyelitis of R 1st & 2nd toe with gangrene 4/2018 s/p toenail removal & abx course presents to the hospital w/ complaints of bilateral LE edema x3 days, w/ endocrine c/s for diabetes management after pt's HgbA1c was found to be 9.2% on admission.     CAPILLARY BLOOD GLUCOSE    POCT Blood Glucose.: 112 mg/dL (27 Apr 2021 08:28)  POCT Blood Glucose.: 103 mg/dL (27 Apr 2021 05:29)  POCT Blood Glucose.: 95 mg/dL (26 Apr 2021 21:15)  POCT Blood Glucose.: 161 mg/dL (26 Apr 2021 17:26)  POCT Blood Glucose.: 179 mg/dL (26 Apr 2021 12:04)       Problem/Plan - 1:  ·  Problem: Type 2 diabetes mellitus with diabetic neuropathy, with long-term current use of insulin.  Plan: target bg 100-180 mg/dl  - continue Lantus 34 units SQ q 0800  - continue Admelog to 6 units qac TID.  - continue low dose correctional sliding scale qac and qhs.   DC PLANS: .   - Plan is for patient to have 24 hour care until patient moves to [facility] with other son full time.  Therefore, since patient adequately demonstrated insulin pen use, patient would need reminders to take insulin.  For now, would recommend tresiba 34 units every AM before breakfast.  Also will start novolog flextouch 6 units premeal SQ TID AC     Patient reprotedly on tresiba and novolog at home- please assure these are the insulins covered by insurance.    Please send Lantus solsotar pen and humalog kwikpen as test script to check insurance coverage.  Ok to send with current doses and update prior to d/c    If Lantus not covered- can try alternating with one of following   tresiba/basaglar/toujeo/Levemir    If Humalog not covered- can try alternating with one of following  novolog/apidra/admelog/fiasp    Please alsr send scripts for glucometer, test strips, lancets, alcohol pads, and BD nolvia pen needles 4 x daily    Patient can establish care in [place] and follow with an endocrinologist/PCP there.    If for any reason the dc plan changes and patient will stay here with 24 hour care, patient can follow up with [facility] endocrinology [phone]

## 2021-04-28 LAB
ANION GAP SERPL CALC-SCNC: 11 MMOL/L — SIGNIFICANT CHANGE UP (ref 7–14)
BUN SERPL-MCNC: 34 MG/DL — HIGH (ref 7–23)
CALCIUM SERPL-MCNC: 9.6 MG/DL — SIGNIFICANT CHANGE UP (ref 8.4–10.5)
CHLORIDE SERPL-SCNC: 102 MMOL/L — SIGNIFICANT CHANGE UP (ref 98–107)
CO2 SERPL-SCNC: 26 MMOL/L — SIGNIFICANT CHANGE UP (ref 22–31)
CREAT SERPL-MCNC: 1.36 MG/DL — HIGH (ref 0.5–1.3)
GAS PNL BLDA: SIGNIFICANT CHANGE UP
GLUCOSE BLDC GLUCOMTR-MCNC: 136 MG/DL — HIGH (ref 70–99)
GLUCOSE BLDC GLUCOMTR-MCNC: 148 MG/DL — HIGH (ref 70–99)
GLUCOSE BLDC GLUCOMTR-MCNC: 177 MG/DL — HIGH (ref 70–99)
GLUCOSE BLDC GLUCOMTR-MCNC: 83 MG/DL — SIGNIFICANT CHANGE UP (ref 70–99)
GLUCOSE SERPL-MCNC: 128 MG/DL — HIGH (ref 70–99)
HCT VFR BLD CALC: 44.6 % — SIGNIFICANT CHANGE UP (ref 34.5–45)
HGB BLD-MCNC: 13.8 G/DL — SIGNIFICANT CHANGE UP (ref 11.5–15.5)
MAGNESIUM SERPL-MCNC: 2.2 MG/DL — SIGNIFICANT CHANGE UP (ref 1.6–2.6)
MCHC RBC-ENTMCNC: 23.5 PG — LOW (ref 27–34)
MCHC RBC-ENTMCNC: 30.9 GM/DL — LOW (ref 32–36)
MCV RBC AUTO: 76.1 FL — LOW (ref 80–100)
NRBC # BLD: 0 /100 WBCS — SIGNIFICANT CHANGE UP
NRBC # FLD: 0 K/UL — SIGNIFICANT CHANGE UP
PHOSPHATE SERPL-MCNC: 2.2 MG/DL — LOW (ref 2.5–4.5)
PLATELET # BLD AUTO: 253 K/UL — SIGNIFICANT CHANGE UP (ref 150–400)
POTASSIUM SERPL-MCNC: 4.5 MMOL/L — SIGNIFICANT CHANGE UP (ref 3.5–5.3)
POTASSIUM SERPL-SCNC: 4.5 MMOL/L — SIGNIFICANT CHANGE UP (ref 3.5–5.3)
RBC # BLD: 5.86 M/UL — HIGH (ref 3.8–5.2)
RBC # FLD: 17.3 % — HIGH (ref 10.3–14.5)
SODIUM SERPL-SCNC: 139 MMOL/L — SIGNIFICANT CHANGE UP (ref 135–145)
WBC # BLD: 6.48 K/UL — SIGNIFICANT CHANGE UP (ref 3.8–10.5)
WBC # FLD AUTO: 6.48 K/UL — SIGNIFICANT CHANGE UP (ref 3.8–10.5)

## 2021-04-28 PROCEDURE — 99233 SBSQ HOSP IP/OBS HIGH 50: CPT

## 2021-04-28 RX ORDER — AMLODIPINE BESYLATE 2.5 MG/1
10 TABLET ORAL DAILY
Refills: 0 | Status: DISCONTINUED | OUTPATIENT
Start: 2021-04-29 | End: 2021-04-30

## 2021-04-28 RX ORDER — METOPROLOL TARTRATE 50 MG
50 TABLET ORAL DAILY
Refills: 0 | Status: DISCONTINUED | OUTPATIENT
Start: 2021-04-28 | End: 2021-04-29

## 2021-04-28 RX ORDER — AMLODIPINE BESYLATE 2.5 MG/1
5 TABLET ORAL ONCE
Refills: 0 | Status: COMPLETED | OUTPATIENT
Start: 2021-04-28 | End: 2021-04-28

## 2021-04-28 RX ORDER — HYDRALAZINE HCL 50 MG
5 TABLET ORAL ONCE
Refills: 0 | Status: COMPLETED | OUTPATIENT
Start: 2021-04-28 | End: 2021-04-28

## 2021-04-28 RX ORDER — HALOPERIDOL DECANOATE 100 MG/ML
0.5 INJECTION INTRAMUSCULAR ONCE
Refills: 0 | Status: COMPLETED | OUTPATIENT
Start: 2021-04-28 | End: 2021-04-28

## 2021-04-28 RX ORDER — DIGOXIN 250 MCG
125 TABLET ORAL ONCE
Refills: 0 | Status: COMPLETED | OUTPATIENT
Start: 2021-04-28 | End: 2021-04-28

## 2021-04-28 RX ADMIN — Medication 650 MILLIGRAM(S): at 20:46

## 2021-04-28 RX ADMIN — APIXABAN 5 MILLIGRAM(S): 2.5 TABLET, FILM COATED ORAL at 20:46

## 2021-04-28 RX ADMIN — Medication 125 MICROGRAM(S): at 20:46

## 2021-04-28 RX ADMIN — CILOSTAZOL 50 MILLIGRAM(S): 100 TABLET ORAL at 09:54

## 2021-04-28 RX ADMIN — Medication 5 MILLIGRAM(S): at 05:53

## 2021-04-28 RX ADMIN — Medication 3 MILLIGRAM(S): at 20:46

## 2021-04-28 RX ADMIN — Medication 25 MILLIGRAM(S): at 09:54

## 2021-04-28 RX ADMIN — Medication 6 UNIT(S): at 12:57

## 2021-04-28 RX ADMIN — Medication 1: at 08:53

## 2021-04-28 RX ADMIN — Medication 81 MILLIGRAM(S): at 09:54

## 2021-04-28 RX ADMIN — Medication 6 UNIT(S): at 17:38

## 2021-04-28 RX ADMIN — AMLODIPINE BESYLATE 5 MILLIGRAM(S): 2.5 TABLET ORAL at 09:54

## 2021-04-28 RX ADMIN — GABAPENTIN 300 MILLIGRAM(S): 400 CAPSULE ORAL at 09:54

## 2021-04-28 RX ADMIN — INSULIN GLARGINE 34 UNIT(S): 100 INJECTION, SOLUTION SUBCUTANEOUS at 08:52

## 2021-04-28 RX ADMIN — Medication 650 MILLIGRAM(S): at 17:38

## 2021-04-28 RX ADMIN — GABAPENTIN 300 MILLIGRAM(S): 400 CAPSULE ORAL at 20:46

## 2021-04-28 RX ADMIN — Medication 6 UNIT(S): at 08:53

## 2021-04-28 RX ADMIN — Medication 650 MILLIGRAM(S): at 09:54

## 2021-04-28 RX ADMIN — CILOSTAZOL 50 MILLIGRAM(S): 100 TABLET ORAL at 20:47

## 2021-04-28 RX ADMIN — AMLODIPINE BESYLATE 5 MILLIGRAM(S): 2.5 TABLET ORAL at 12:57

## 2021-04-28 RX ADMIN — APIXABAN 5 MILLIGRAM(S): 2.5 TABLET, FILM COATED ORAL at 09:55

## 2021-04-28 RX ADMIN — MONTELUKAST 10 MILLIGRAM(S): 4 TABLET, CHEWABLE ORAL at 09:55

## 2021-04-28 RX ADMIN — GABAPENTIN 300 MILLIGRAM(S): 400 CAPSULE ORAL at 17:38

## 2021-04-28 RX ADMIN — ATORVASTATIN CALCIUM 80 MILLIGRAM(S): 80 TABLET, FILM COATED ORAL at 20:46

## 2021-04-28 RX ADMIN — HALOPERIDOL DECANOATE 0.5 MILLIGRAM(S): 100 INJECTION INTRAMUSCULAR at 19:36

## 2021-04-28 NOTE — PROGRESS NOTE ADULT - PROBLEM SELECTOR PLAN 5
- c/w metoprolol, norvasc 5mg qd added for optimize bp control, cont to monitor  - DASH diet  - would increase bp meds and monitor

## 2021-04-28 NOTE — PROGRESS NOTE ADULT - SUBJECTIVE AND OBJECTIVE BOX
[facility] Division of Hospital Medicine  Dara Barros MD  Pager (M-F, 8A-5P): [phone]  Other Times:  [phone]    Patient is a 81y old  Female who presents with a chief complaint of Bilateral lower extremity swelling (27 Apr 2021 11:53)    SUBJECTIVE / OVERNIGHT EVENTS:  Pt with another episode of difficulty to arouse this am - she did receive haldol last night which may have contributed.  she denies complaints.      MEDICATIONS  (STANDING):  acetaminophen   Tablet .. 650 milliGRAM(s) Oral every 8 hours  amLODIPine   Tablet 10 milliGRAM(s) Oral daily  apixaban 5 milliGRAM(s) Oral two times a day  aspirin enteric coated 81 milliGRAM(s) Oral daily  atorvastatin 80 milliGRAM(s) Oral at bedtime  cilostazol 50 milliGRAM(s) Oral two times a day  dextrose 40% Gel 15 Gram(s) Oral once  dextrose 5%. 1000 milliLiter(s) (50 mL/Hr) IV Continuous <Continuous>  dextrose 5%. 1000 milliLiter(s) (100 mL/Hr) IV Continuous <Continuous>  dextrose 50% Injectable 25 Gram(s) IV Push once  dextrose 50% Injectable 12.5 Gram(s) IV Push once  dextrose 50% Injectable 25 Gram(s) IV Push once  gabapentin 300 milliGRAM(s) Oral three times a day  insulin glargine Injectable (LANTUS) 34 Unit(s) SubCutaneous <User Schedule>  insulin lispro (ADMELOG) corrective regimen sliding scale   SubCutaneous three times a day before meals  insulin lispro (ADMELOG) corrective regimen sliding scale   SubCutaneous at bedtime  insulin lispro Injectable (ADMELOG) 6 Unit(s) SubCutaneous three times a day before meals  melatonin 3 milliGRAM(s) Oral at bedtime  metoprolol succinate ER 25 milliGRAM(s) Oral daily  montelukast 10 milliGRAM(s) Oral daily    MEDICATIONS  (PRN):  haloperidol    Injectable 1 milliGRAM(s) IntraMuscular every 8 hours PRN aggression      CAPILLARY BLOOD GLUCOSE      POCT Blood Glucose.: 148 mg/dL (28 Apr 2021 12:04)  POCT Blood Glucose.: 177 mg/dL (28 Apr 2021 08:36)  POCT Blood Glucose.: 128 mg/dL (27 Apr 2021 21:06)  POCT Blood Glucose.: 269 mg/dL (27 Apr 2021 17:15)    I&O's Summary    27 Apr 2021 07:01  -  28 Apr 2021 07:00  --------------------------------------------------------  IN: 480 mL / OUT: 1550 mL / NET: -1070 mL        PHYSICAL EXAM:  Vital Signs Last 24 Hrs  T(C): 36.8 (28 Apr 2021 09:51), Max: 36.9 (28 Apr 2021 05:24)  T(F): 98.2 (28 Apr 2021 09:51), Max: 98.4 (28 Apr 2021 05:24)  HR: 82 (28 Apr 2021 12:55) (59 - 89)  BP: 151/76 (28 Apr 2021 12:55) (141/51 - 231/96)  BP(mean): --  RR: 17 (28 Apr 2021 12:55) (16 - 18)  SpO2: 98% (28 Apr 2021 12:55) (97% - 100%)    CONSTITUTIONAL: NAD  EYES: EOMI;  ENMT: Moist oral mucosa  RESPIRATORY: Normal respiratory effort; lungs are clear to auscultation bilaterally  CARDIOVASCULAR: Regular rate and rhythm, normal S1 and S2, no murmur; trace pedal edema   ABDOMEN: Nontender to palpation, normoactive bowel sounds, no rebound/guarding  MUSCULOSKELETAL:   no joint swelling or tenderness to palpation  PSYCH: alert and calm   NEUROLOGY: CN 2-12 are intact and symmetric; no gross sensory deficits   SKIN: No rashes; no palpable lesions    LABS:                        13.8   6.48  )-----------( 253      ( 28 Apr 2021 07:08 )             44.6     04-28    139  |  102  |  34<H>  ----------------------------<  128<H>  4.5   |  26  |  1.36<H>    Ca    9.6      28 Apr 2021 07:08  Phos  2.2     04-28  Mg     2.2     04-28    TPro  8.7<H>  /  Alb  4.0  /  TBili  0.7  /  DBili  x   /  AST  35<H>  /  ALT  28  /  AlkPhos  108  04-27    PT/INR - ( 27 Apr 2021 06:12 )   PT: 16.3 sec;   INR: 1.44 ratio         PTT - ( 27 Apr 2021 06:12 )  PTT:46.8 sec          RADIOLOGY & ADDITIONAL TESTS:  Results Reviewed:   Imaging Personally Reviewed:  Electrocardiogram Personally Reviewed:    COORDINATION OF CARE:  Care Discussed with Consultants/Other Providers [Y/N]: Y  Prior or Outpatient Records Reviewed [Y/N]: Y

## 2021-04-28 NOTE — PROGRESS NOTE ADULT - PROBLEM SELECTOR PLAN 1
-Now calm and agreeable for rehab, calm since 4/23  - psych eval as likely needs to be medicated to allow care given severity of agitation, collateral from family suggested prior episodes of delirium  - no s/s of infection that would precipitate likely 2/2 age and hospital environment  - advised son likely has underlying dementia and should f/u with OP neuro  - family does not wish for rehab at this time and would prefer d/c home with aides

## 2021-04-28 NOTE — CHART NOTE - NSCHARTNOTEFT_GEN_A_CORE
Notified by RN this morning, pt appears to have similar event as yesterday, during morning Vitals check pt was in very deep sleep, unresponsive to sternal rub, BP elevated - hydralazine 5mg IVP x 1 ordered, ABG done.  As per RN pt was at her baseline around 4am, but after appears in very deep sleep, received 1mg of Haldol last night at 8pm    Vital Signs Last 24 Hrs  T(F): 98.4 (28 Apr 2021 05:24), Max: 98.4 (27 Apr 2021 11:00)  HR: 85 (28 Apr 2021 05:47) (59 - 112)  BP: 186/92 (28 Apr 2021 05:47) (128/72 - 231/96)  RR: 18 (28 Apr 2021 05:24) (16 - 18)  SpO2: 97% (28 Apr 2021 05:24) (97% - 100%)

## 2021-04-28 NOTE — PROGRESS NOTE ADULT - PROBLEM SELECTOR PLAN 2
- early AM events reviewed from today - may be due to haldol that she received last night at 11 pm.   - pt now at baseline  - would continue to monitor on tele for today

## 2021-04-29 LAB
GLUCOSE BLDC GLUCOMTR-MCNC: 109 MG/DL — HIGH (ref 70–99)
GLUCOSE BLDC GLUCOMTR-MCNC: 114 MG/DL — HIGH (ref 70–99)
GLUCOSE BLDC GLUCOMTR-MCNC: 158 MG/DL — HIGH (ref 70–99)
GLUCOSE BLDC GLUCOMTR-MCNC: 271 MG/DL — HIGH (ref 70–99)
GLUCOSE BLDC GLUCOMTR-MCNC: 52 MG/DL — CRITICAL LOW (ref 70–99)
GLUCOSE BLDC GLUCOMTR-MCNC: 63 MG/DL — LOW (ref 70–99)
GLUCOSE BLDC GLUCOMTR-MCNC: 84 MG/DL — SIGNIFICANT CHANGE UP (ref 70–99)
GLUCOSE BLDC GLUCOMTR-MCNC: 91 MG/DL — SIGNIFICANT CHANGE UP (ref 70–99)

## 2021-04-29 PROCEDURE — 99233 SBSQ HOSP IP/OBS HIGH 50: CPT

## 2021-04-29 PROCEDURE — 99232 SBSQ HOSP IP/OBS MODERATE 35: CPT

## 2021-04-29 RX ORDER — INSULIN GLARGINE 100 [IU]/ML
28 INJECTION, SOLUTION SUBCUTANEOUS
Refills: 0 | Status: DISCONTINUED | OUTPATIENT
Start: 2021-04-29 | End: 2021-04-30

## 2021-04-29 RX ORDER — INSULIN ASPART 100 [IU]/ML
6 INJECTION, SOLUTION SUBCUTANEOUS
Qty: 1 | Refills: 0
Start: 2021-04-29 | End: 2021-05-28

## 2021-04-29 RX ORDER — INSULIN DEGLUDEC 100 U/ML
28 INJECTION, SOLUTION SUBCUTANEOUS
Qty: 1 | Refills: 0
Start: 2021-04-29 | End: 2021-05-28

## 2021-04-29 RX ORDER — INSULIN DEGLUDEC 100 U/ML
52 INJECTION, SOLUTION SUBCUTANEOUS
Qty: 0 | Refills: 0 | DISCHARGE

## 2021-04-29 RX ORDER — AMLODIPINE BESYLATE 2.5 MG/1
1 TABLET ORAL
Qty: 30 | Refills: 0
Start: 2021-04-29 | End: 2021-05-28

## 2021-04-29 RX ORDER — METOPROLOL TARTRATE 50 MG
25 TABLET ORAL DAILY
Refills: 0 | Status: DISCONTINUED | OUTPATIENT
Start: 2021-04-30 | End: 2021-04-30

## 2021-04-29 RX ORDER — INSULIN ASPART 100 [IU]/ML
0 INJECTION, SOLUTION SUBCUTANEOUS
Qty: 0 | Refills: 0 | DISCHARGE

## 2021-04-29 RX ADMIN — Medication 650 MILLIGRAM(S): at 14:21

## 2021-04-29 RX ADMIN — GABAPENTIN 300 MILLIGRAM(S): 400 CAPSULE ORAL at 21:06

## 2021-04-29 RX ADMIN — GABAPENTIN 300 MILLIGRAM(S): 400 CAPSULE ORAL at 14:21

## 2021-04-29 RX ADMIN — Medication 50 MILLIGRAM(S): at 05:12

## 2021-04-29 RX ADMIN — Medication 650 MILLIGRAM(S): at 05:12

## 2021-04-29 RX ADMIN — CILOSTAZOL 50 MILLIGRAM(S): 100 TABLET ORAL at 18:04

## 2021-04-29 RX ADMIN — Medication 1: at 18:04

## 2021-04-29 RX ADMIN — APIXABAN 5 MILLIGRAM(S): 2.5 TABLET, FILM COATED ORAL at 05:12

## 2021-04-29 RX ADMIN — Medication 3: at 12:27

## 2021-04-29 RX ADMIN — AMLODIPINE BESYLATE 10 MILLIGRAM(S): 2.5 TABLET ORAL at 05:12

## 2021-04-29 RX ADMIN — APIXABAN 5 MILLIGRAM(S): 2.5 TABLET, FILM COATED ORAL at 18:04

## 2021-04-29 RX ADMIN — Medication 81 MILLIGRAM(S): at 12:26

## 2021-04-29 RX ADMIN — Medication 3 MILLIGRAM(S): at 21:06

## 2021-04-29 RX ADMIN — INSULIN GLARGINE 28 UNIT(S): 100 INJECTION, SOLUTION SUBCUTANEOUS at 12:27

## 2021-04-29 RX ADMIN — CILOSTAZOL 50 MILLIGRAM(S): 100 TABLET ORAL at 05:12

## 2021-04-29 RX ADMIN — MONTELUKAST 10 MILLIGRAM(S): 4 TABLET, CHEWABLE ORAL at 12:26

## 2021-04-29 RX ADMIN — Medication 6 UNIT(S): at 12:26

## 2021-04-29 RX ADMIN — ATORVASTATIN CALCIUM 80 MILLIGRAM(S): 80 TABLET, FILM COATED ORAL at 21:06

## 2021-04-29 RX ADMIN — Medication 650 MILLIGRAM(S): at 21:06

## 2021-04-29 RX ADMIN — Medication 650 MILLIGRAM(S): at 14:30

## 2021-04-29 RX ADMIN — Medication 6 UNIT(S): at 18:04

## 2021-04-29 RX ADMIN — GABAPENTIN 300 MILLIGRAM(S): 400 CAPSULE ORAL at 05:12

## 2021-04-29 NOTE — PROGRESS NOTE ADULT - SUBJECTIVE AND OBJECTIVE BOX
Chief Complaint: DM2    History: Glucose in the 80s this AM, Lantus and premeal held  reordered Lantus for noon and now to be given, discussed with RN  patient is tolerating po  denies hypoglycemia symptoms  notes some neuropathy symptoms in feet    MEDICATIONS  (STANDING):  acetaminophen   Tablet .. 650 milliGRAM(s) Oral every 8 hours  amLODIPine   Tablet 10 milliGRAM(s) Oral daily  apixaban 5 milliGRAM(s) Oral two times a day  aspirin enteric coated 81 milliGRAM(s) Oral daily  atorvastatin 80 milliGRAM(s) Oral at bedtime  cilostazol 50 milliGRAM(s) Oral two times a day  dextrose 40% Gel 15 Gram(s) Oral once  dextrose 5%. 1000 milliLiter(s) (50 mL/Hr) IV Continuous <Continuous>  dextrose 5%. 1000 milliLiter(s) (100 mL/Hr) IV Continuous <Continuous>  dextrose 50% Injectable 25 Gram(s) IV Push once  dextrose 50% Injectable 12.5 Gram(s) IV Push once  dextrose 50% Injectable 25 Gram(s) IV Push once  gabapentin 300 milliGRAM(s) Oral three times a day  insulin glargine Injectable (LANTUS) 28 Unit(s) SubCutaneous <User Schedule>  insulin lispro (ADMELOG) corrective regimen sliding scale   SubCutaneous three times a day before meals  insulin lispro (ADMELOG) corrective regimen sliding scale   SubCutaneous at bedtime  insulin lispro Injectable (ADMELOG) 6 Unit(s) SubCutaneous three times a day before meals  melatonin 3 milliGRAM(s) Oral at bedtime  montelukast 10 milliGRAM(s) Oral daily    MEDICATIONS  (PRN):  haloperidol    Injectable 1 milliGRAM(s) IntraMuscular every 8 hours PRN aggression      Allergies    No Known Allergies    Intolerances      Review of Systems:  ALL OTHER SYSTEMS REVIEWED AND NEGATIVE      PHYSICAL EXAM:  VITALS: T(C): 36.2 (04-29-21 @ 11:49)  T(F): 97.2 (04-29-21 @ 11:49), Max: 98.6 (04-28-21 @ 20:28)  HR: 91 (04-29-21 @ 11:49) (91 - 125)  BP: 144/75 (04-29-21 @ 11:49) (98/62 - 144/75)  RR:  (18 - 18)  SpO2:  (97% - 100%)  Wt(kg): --  GENERAL: NAD, well-groomed, well-developed  EYES: No proptosis, no lid lag, anicteric  HEENT:  Atraumatic, Normocephalic, moist mucous membranes  RESPIRATORY: nonlabored respirations, no wheezing  PSYCH: Alert and awake, pleasant    CAPILLARY BLOOD GLUCOSE      POCT Blood Glucose.: 271 mg/dL (29 Apr 2021 12:25)  POCT Blood Glucose.: 84 mg/dL (29 Apr 2021 08:34)  POCT Blood Glucose.: 83 mg/dL (28 Apr 2021 21:47)  POCT Blood Glucose.: 136 mg/dL (28 Apr 2021 17:33)      04-28    139  |  102  |  34<H>  ----------------------------<  128<H>  4.5   |  26  |  1.36<H>    EGFR if : 42<L>  EGFR if non : 37<L>    Ca    9.6      04-28  Mg     2.2     04-28  Phos  2.2     04-28    TPro  8.7<H>  /  Alb  4.0  /  TBili  0.7  /  DBili  x   /  AST  35<H>  /  ALT  28  /  AlkPhos  108  04-27      A1C with Estimated Average Glucose Result: 9.2 % (04-21-21 @ 06:23)  A1C with Estimated Average Glucose Result: 8.8 % (07-29-20 @ 11:45)      Thyroid Function Tests:  04-21 @ 06:23 TSH 1.36 FreeT4 -- T3 -- Anti TPO -- Anti Thyroglobulin Ab -- TSI --

## 2021-04-29 NOTE — PROGRESS NOTE ADULT - PROBLEM SELECTOR PLAN 6
At home on Tresiba 52U at bedtime w/ Novolog sliding scale; poor control with HbA1c of >9  - insulin regimen per endo, currently on lantus 34 units and 6 units with meals  - RD consult  - pt given syringe with insulin and was able to self-inject however given memory issues doubt can do 4x a day insulin   - appreciate endocrine recs   - c/w gabapentin for neuropathy At home on Tresiba 52U at bedtime w/ Novolog sliding scale; poor control with HbA1c of >9  - insulin regimen per endo, mild decrease made to regimen today   - RD consult  - pt given syringe with insulin and was able to self-inject however given memory issues doubt can do 4x a day insulin   - appreciate endocrine recs   - c/w gabapentin for neuropathy

## 2021-04-29 NOTE — PROGRESS NOTE ADULT - SUBJECTIVE AND OBJECTIVE BOX
[facility] Division of Hospital Medicine  Dara Barros MD  Pager (M-F, 8A-5P): [phone]  Other Times:  [phone]    Patient is a 81y old  Female who presents with a chief complaint of Bilateral lower extremity swelling (28 Apr 2021 13:47)    SUBJECTIVE / OVERNIGHT EVENTS:  Pt at her baseline - pleasant.      MEDICATIONS  (STANDING):  acetaminophen   Tablet .. 650 milliGRAM(s) Oral every 8 hours  amLODIPine   Tablet 10 milliGRAM(s) Oral daily  apixaban 5 milliGRAM(s) Oral two times a day  aspirin enteric coated 81 milliGRAM(s) Oral daily  atorvastatin 80 milliGRAM(s) Oral at bedtime  cilostazol 50 milliGRAM(s) Oral two times a day  dextrose 40% Gel 15 Gram(s) Oral once  dextrose 5%. 1000 milliLiter(s) (50 mL/Hr) IV Continuous <Continuous>  dextrose 5%. 1000 milliLiter(s) (100 mL/Hr) IV Continuous <Continuous>  dextrose 50% Injectable 25 Gram(s) IV Push once  dextrose 50% Injectable 12.5 Gram(s) IV Push once  dextrose 50% Injectable 25 Gram(s) IV Push once  gabapentin 300 milliGRAM(s) Oral three times a day  insulin glargine Injectable (LANTUS) 28 Unit(s) SubCutaneous <User Schedule>  insulin lispro (ADMELOG) corrective regimen sliding scale   SubCutaneous three times a day before meals  insulin lispro (ADMELOG) corrective regimen sliding scale   SubCutaneous at bedtime  insulin lispro Injectable (ADMELOG) 6 Unit(s) SubCutaneous three times a day before meals  melatonin 3 milliGRAM(s) Oral at bedtime  montelukast 10 milliGRAM(s) Oral daily    MEDICATIONS  (PRN):  haloperidol    Injectable 1 milliGRAM(s) IntraMuscular every 8 hours PRN aggression      CAPILLARY BLOOD GLUCOSE      POCT Blood Glucose.: 271 mg/dL (29 Apr 2021 12:25)  POCT Blood Glucose.: 84 mg/dL (29 Apr 2021 08:34)  POCT Blood Glucose.: 83 mg/dL (28 Apr 2021 21:47)  POCT Blood Glucose.: 136 mg/dL (28 Apr 2021 17:33)    I&O's Summary    28 Apr 2021 07:01  -  29 Apr 2021 07:00  --------------------------------------------------------  IN: 440 mL / OUT: 1400 mL / NET: -960 mL        PHYSICAL EXAM:  Vital Signs Last 24 Hrs  T(C): 36.2 (29 Apr 2021 11:49), Max: 37 (28 Apr 2021 20:28)  T(F): 97.2 (29 Apr 2021 11:49), Max: 98.6 (28 Apr 2021 20:28)  HR: 91 (29 Apr 2021 11:49) (91 - 125)  BP: 144/75 (29 Apr 2021 11:49) (98/62 - 144/75)  BP(mean): --  RR: 18 (29 Apr 2021 11:49) (18 - 18)  SpO2: 100% (29 Apr 2021 11:49) (97% - 100%)    CONSTITUTIONAL: NAD  EYES: EOMI;  ENMT: Moist oral mucosa  RESPIRATORY: Normal respiratory effort; lungs are clear to auscultation bilaterally  CARDIOVASCULAR: Regular rate and rhythm, normal S1 and S2, no murmur; trace pedal edema   ABDOMEN: Nontender to palpation, normoactive bowel sounds, no rebound/guarding  MUSCULOSKELETAL:   no joint swelling or tenderness to palpation  PSYCH: alert and calm   NEUROLOGY: CN 2-12 are intact and symmetric; no gross sensory deficits   SKIN: No rashes; no palpable lesions    LABS:                        13.8   6.48  )-----------( 253      ( 28 Apr 2021 07:08 )             44.6     04-28    139  |  102  |  34<H>  ----------------------------<  128<H>  4.5   |  26  |  1.36<H>    Ca    9.6      28 Apr 2021 07:08  Phos  2.2     04-28  Mg     2.2     04-28        RADIOLOGY & ADDITIONAL TESTS:  Results Reviewed:   Imaging Personally Reviewed:  Electrocardiogram Personally Reviewed:    COORDINATION OF CARE:  Care Discussed with Consultants/Other Providers [Y/N]: Y  Prior or Outpatient Records Reviewed [Y/N]: Y

## 2021-04-29 NOTE — PROGRESS NOTE ADULT - PROBLEM SELECTOR PLAN 1
target bg 100-180 mg/dl  Glucose 80s this AM, Lantus AM dosing held  lowered Lantus dose and rescheduled for noon time, discussed with RN ok to give.  - Decreased Lantus to 28 units at 12PM daily.  - continue Admelog 6 units qac TID.  - continue low dose correctional sliding scale qac and low qhs scale.  DC PLANS: .   - Plan is for patient to have 24 hour care until patient moves to [place] with other son full time.  Therefore, since patient adequately demonstrated insulin pen use, patient would need reminders to take insulin.  Anticipate discharge on Tresiba and Novolog, doses TBD.

## 2021-04-29 NOTE — PROGRESS NOTE ADULT - PROBLEM SELECTOR PLAN 5
- c/w metoprolol, norvasc 5mg qd added for optimize bp control, cont to monitor  - DASH diet  - pt with mild hypotension - possibly due to increased BP meds from yesterday - will decrease metoprolol to 25 mg and continue with amlodipine 10 mg.

## 2021-04-29 NOTE — PROGRESS NOTE ADULT - ASSESSMENT
82 y/o F w PMHx of HTN, HLD, AFib on Eliquis, IDDM w/ neuropathy, sHFrEF (EF 40-45% TTE 4/2018), CAD s/p cardiac cath 4/2018 @ [facility] (pLAD 20% ISR, oD2 60%, dOM3 small with 99% ISR but supplied by collaterals from OM2, mRCA 30% with plan for aggressive medical management), osteomyelitis of R 1st & 2nd toe with gangrene 4/2018 s/p toenail removal & abx course presents to the hospital w/ complaints of bilateral LE edema x3 days, w/ endocrine c/s for diabetes management after pt's HgbA1c was found to be 9.2% on admission.

## 2021-04-29 NOTE — PROGRESS NOTE ADULT - PROBLEM SELECTOR PLAN 3
Continue atorvastatin 80mg    Dayami Perez MD  Division of Endocrinology  Pager: [phone]    If after 6PM or before 9AM, or on weekends/holidays, please call endocrine answering service for assistance ([phone]).  For nonurgent matters email [email or web address] for assistance.

## 2021-04-29 NOTE — PROGRESS NOTE ADULT - ASSESSMENT
81 obese (BMI 41) F with HTN, HLD, AFib on Eliquis,  DM2 on insulin w/ neuropathy, HFrEF (EF 40-45% TTE 4/2018), CAD, CKD3, OM of R 1st & 2nd toe s/p abx course presents to the hospital w/ complaints of bilateral LE edema x3 days

## 2021-04-30 ENCOUNTER — TRANSCRIPTION ENCOUNTER (OUTPATIENT)
Age: 81
End: 2021-04-30

## 2021-04-30 VITALS
SYSTOLIC BLOOD PRESSURE: 128 MMHG | OXYGEN SATURATION: 96 % | RESPIRATION RATE: 18 BRPM | TEMPERATURE: 98 F | DIASTOLIC BLOOD PRESSURE: 71 MMHG | HEART RATE: 75 BPM

## 2021-04-30 LAB
GLUCOSE BLDC GLUCOMTR-MCNC: 101 MG/DL — HIGH (ref 70–99)
GLUCOSE BLDC GLUCOMTR-MCNC: 110 MG/DL — HIGH (ref 70–99)

## 2021-04-30 PROCEDURE — 99239 HOSP IP/OBS DSCHRG MGMT >30: CPT

## 2021-04-30 RX ORDER — INSULIN GLARGINE 100 [IU]/ML
24 INJECTION, SOLUTION SUBCUTANEOUS
Refills: 0 | Status: DISCONTINUED | OUTPATIENT
Start: 2021-04-30 | End: 2021-04-30

## 2021-04-30 RX ORDER — INSULIN LISPRO 100/ML
4 VIAL (ML) SUBCUTANEOUS
Refills: 0 | Status: DISCONTINUED | OUTPATIENT
Start: 2021-04-30 | End: 2021-04-30

## 2021-04-30 RX ORDER — LANOLIN ALCOHOL/MO/W.PET/CERES
1 CREAM (GRAM) TOPICAL
Qty: 30 | Refills: 0
Start: 2021-04-30 | End: 2021-05-29

## 2021-04-30 RX ADMIN — Medication 6 UNIT(S): at 08:59

## 2021-04-30 RX ADMIN — Medication 81 MILLIGRAM(S): at 11:18

## 2021-04-30 RX ADMIN — CILOSTAZOL 50 MILLIGRAM(S): 100 TABLET ORAL at 05:08

## 2021-04-30 RX ADMIN — INSULIN GLARGINE 24 UNIT(S): 100 INJECTION, SOLUTION SUBCUTANEOUS at 11:39

## 2021-04-30 RX ADMIN — HALOPERIDOL DECANOATE 1 MILLIGRAM(S): 100 INJECTION INTRAMUSCULAR at 08:30

## 2021-04-30 RX ADMIN — APIXABAN 5 MILLIGRAM(S): 2.5 TABLET, FILM COATED ORAL at 05:08

## 2021-04-30 RX ADMIN — AMLODIPINE BESYLATE 10 MILLIGRAM(S): 2.5 TABLET ORAL at 05:08

## 2021-04-30 RX ADMIN — Medication 650 MILLIGRAM(S): at 05:08

## 2021-04-30 RX ADMIN — Medication 25 MILLIGRAM(S): at 05:08

## 2021-04-30 RX ADMIN — MONTELUKAST 10 MILLIGRAM(S): 4 TABLET, CHEWABLE ORAL at 11:17

## 2021-04-30 RX ADMIN — GABAPENTIN 300 MILLIGRAM(S): 400 CAPSULE ORAL at 05:08

## 2021-04-30 NOTE — PROGRESS NOTE ADULT - PROVIDER SPECIALTY LIST ADULT
Endocrinology
Endocrinology
Hospitalist
Endocrinology
Hospitalist
Hospitalist
Endocrinology
Hospitalist

## 2021-04-30 NOTE — CHART NOTE - NSCHARTNOTEFT_GEN_A_CORE
Another episode of unresonsiveness occurred this morning 430am.  She returned to her baseline rightafter.  PMD made aware.  Agreed with continue to monitor and she may return home this afternoon.  Emilia CORONA

## 2021-04-30 NOTE — PROVIDER CONTACT NOTE (OTHER) - RECOMMENDATIONS
will continue to monitor
continue to encourage benefits of having monitor on   family to be involved in care
continue to encourage benefits of having monitor on   family to be involved in care  patient is resting soundly currently   vital signs stable
will continue to monitor
will continue to monitor
will continue to monitor, give IV hydralzine and repeat BP in 30-45 minutes
will continue to monitor, repeat BP, place patient on continuous pulse ox and 2L NC
Psych called   family to be involved in care
will continue to monitor
will continue to monitor and follow the hypoglycemic protocol
will continue to monitor give IV hydralzine and push back rest of medications & try again later on in day when patient is possibly able to swallow PO medications
will continue to monitor
continue to encourage benefits of having monitor on   family to be involved in care
continue to encourage benefits of having monitor on   family to be involved in care

## 2021-04-30 NOTE — PROVIDER CONTACT NOTE (OTHER) - NAME OF MD/NP/PA/DO NOTIFIED:
ACP Michelle Dumont, [id number]
acp fortunato
acp fortunato
acp lisset
ACP Michelle Dumont, [id number]
KWAN, Ericka Eagle
acp lisset
ACP Michelle Dumont, [id number]
ACP Michelle Dumont, [id number]
ACP, Berna Mayes
acp fortunato
acp lisset
ACP Michelle Dumont, [id number]
CJ Dumont
acp lisset
acp fortunato
acp fortunato

## 2021-04-30 NOTE — PROVIDER CONTACT NOTE (OTHER) - REASON
patient is hypertensive and unresponsive to stimuli
increased HR due to restless and agitation
patient is hypertensive and unresponsive to stimuli
patient is no longer hypogylcemic
patient noted with severe agitation, kicking biting, punching,
Pt very agitated with rapid afib with HR between 160s-190s
patient is unable to give AM medications
/91
6 beats vtach on tele monitor
patient HR is sustaining 130s-160s
patient lethargic and not responding to stimuli
patient refusing tele Monitor at this time
patient had 6 beats
patient refusing finger stick  at this time
patient refusing medication at this time
patient is hypoglycemic
patient is hypotensive and HR is elevated

## 2021-04-30 NOTE — PROGRESS NOTE ADULT - PROBLEM SELECTOR PLAN 5
- c/w metoprolol, norvasc 5mg qd added for optimize bp control, cont to monitor  - DASH diet  - pt with mild hypotension - possibly due to increased BP meds- decrease metoprolol to 25 mg and continue with amlodipine 10 mg.

## 2021-04-30 NOTE — PROGRESS NOTE ADULT - PROBLEM SELECTOR PLAN 4
[id number] risk stratification score: 7; TSH 1.36  - c/w Eliquis 5mg BID & Metoprolol 25mg daily
[id number] risk stratification score: 7; TSH 1.36  - c/w Eliquis 5mg BID & Metoprolol 25mg daily
[id number]-[id number] risk stratification score: 7; TSH 1.36  - c/w Eliquis 5mg BID & Metoprolol 25mg daily
[id number]-HEHv risk stratification score: 7; TSH 1.36  - c/w Eliquis 5mg BID & Metoprolol 25mg daily
[id number]-BDXr risk stratification score: 7; TSH 1.36  - c/w Eliquis 5mg BID & Metoprolol 25mg daily
[id number]-DZQw risk stratification score: 7; TSH 1.36  - c/w Eliquis 5mg BID & Metoprolol 25mg daily
- c/w metoprolol  - DASH diet
[id number]-XPSm risk stratification score: 7; TSH 1.36  - c/w Eliquis 5mg BID & Metoprolol 25mg daily
[id number]-AZXg risk stratification score: 7; TSH 1.36  - c/w Eliquis 5mg BID & Metoprolol 25mg daily
[id number]-SEMu risk stratification score: 7; TSH 1.36  - c/w Eliquis 5mg BID & Metoprolol 25mg daily

## 2021-04-30 NOTE — PROGRESS NOTE ADULT - PROBLEM SELECTOR PROBLEM 2
Hypertension, unspecified type
Leg edema
Leg edema
Unresponsive
Unresponsive
Hypertension, unspecified type
Unresponsive
Unresponsive
Chronic systolic heart failure
Leg edema

## 2021-04-30 NOTE — PROGRESS NOTE ADULT - REASON FOR ADMISSION
Bilateral lower extremity swelling
LE swelling, R>L
Bilateral lower extremity swelling

## 2021-04-30 NOTE — PROGRESS NOTE ADULT - PROBLEM SELECTOR PLAN 1
-Now calm and agreeable for rehab, calm since 4/23  - no s/s of infection that would precipitate likely 2/2 age and hospital environment  - advised son likely has underlying dementia and should f/u with OP neuro  - family does not wish for rehab at this time and would prefer d/c home with aides

## 2021-04-30 NOTE — CHART NOTE - NSCHARTNOTESELECT_GEN_ALL_CORE
Event Note
ACP/Event Note
Endocrinology/Event Note
Event Note

## 2021-04-30 NOTE — PROGRESS NOTE ADULT - PROBLEM SELECTOR PROBLEM 1
Type 2 diabetes mellitus with diabetic neuropathy, with long-term current use of insulin
Leg edema
Type 2 diabetes mellitus with diabetic neuropathy, with long-term current use of insulin
Type 2 diabetes mellitus with diabetic neuropathy, with long-term current use of insulin
Delirium
Type 2 diabetes mellitus with diabetic neuropathy, with long-term current use of insulin
Delirium

## 2021-04-30 NOTE — PROVIDER CONTACT NOTE (OTHER) - SITUATION
patient BP was low and HR is sustaining in the 130s-140s
patient refusing tele Monitor at this time  management aware
/91
patient had 6 beats on tele
patient is HR is sustaining 130s-160s afib, patient is refusing VS, has no IV access, and refusing medication. Patient gets aggressive when asked to get vitals, or give medication. Hitting andspitting
patient refusing finger stick  at this time  management aware
6 beats vtach on tele monitor
patient refusing medication  at this time  management aware
patient is hypertensive, unresponsive to sternal rub, and stimuli
patient is hypoglycemic during night time finger stick.
patient noted with severe agitation, kicking biting, punching,  management aware, son ( Arturo)  called approved to give medications as ordered   television on, music on.
patient is hypoglycemic during night time finger stick, patient was given apple juice and is no longer hypoglycemic
patient is hypertensive, unresponsive to sternal rub, and stimuli
patient was meant to take AM meds, was very lethargic and not reacting to stimuli
increased HR due to restless and agitation   management aware
patient is hypertensive, unresponsive to sternal rub, and stimuli, unable to take medications due to cognitive status

## 2021-04-30 NOTE — DISCHARGE NOTE NURSING/CASE MANAGEMENT/SOCIAL WORK - PATIENT PORTAL LINK FT
You can access the FollowMyHealth Patient Portal offered by [facility] by registering at the following website: [email or web address]. By joining [facility]’s FollowMyHealth portal, you will also be able to view your health information using other applications (apps) compatible with our system.

## 2021-04-30 NOTE — DISCHARGE NOTE NURSING/CASE MANAGEMENT/SOCIAL WORK - NSDCFUADDAPPT_GEN_ALL_CORE_FT
Follow up with your primary care doctor within one week of discharge. If you do not have one, you can call the medicine clinic at [phone] to make an appointment

## 2021-04-30 NOTE — PROVIDER CONTACT NOTE (OTHER) - ACTION/TREATMENT ORDERED:
ACP, Informed. Will continue to monitor and follow up with any further instructions given
awaiting call back from family   several messages left
awaiting call back from family   several messages left
continue to encourage benefits of having monitor on   family to be involved in care  patient is resting soundly currently   vital signs stable  will continue to monitor patients  status
will continue to monitor
will continue to monitor
will continue to monitor and follow the hypoglycemic protocol
Haldol IM given as ordered; tried to contact son Arturo but he did not  the phone
will continue to monitor
will continue to monitor give IV hydralzine and push back rest of medications & try again later on in day when patient is possibly able to swallow PO medications
ACP informed. Will continue to monitor and follow up with any further instructions given
will continue to monitor, give IM haldol to relieve aggression and calm down patient and then reattempt
will continue to monitor, give IV hydralzine and repeat BP in 30-45 minutes
will continue to monitor, give PO digoxin
Psych called   family to be involved in care  son cyn awaiting arrival
awaiting call back from family   several messages left
will continue to monitor, repeat BP, place patient on continuous pulse ox and 2L NC

## 2021-04-30 NOTE — PROGRESS NOTE ADULT - SUBJECTIVE AND OBJECTIVE BOX
[facility] Division of Hospital Medicine  Dara Barros MD  Pager (M-F, 8A-5P): [phone]  Other Times:  [phone]    Patient is a 81y old  Female who presents with a chief complaint of Bilateral lower extremity swelling (29 Apr 2021 13:14)    SUBJECTIVE / OVERNIGHT EVENTS:  pt mentating at baseline - she is likely in a deep sleep state at 5 am when she is being awakened.      MEDICATIONS  (STANDING):  acetaminophen   Tablet .. 650 milliGRAM(s) Oral every 8 hours  amLODIPine   Tablet 10 milliGRAM(s) Oral daily  apixaban 5 milliGRAM(s) Oral two times a day  aspirin enteric coated 81 milliGRAM(s) Oral daily  atorvastatin 80 milliGRAM(s) Oral at bedtime  cilostazol 50 milliGRAM(s) Oral two times a day  dextrose 40% Gel 15 Gram(s) Oral once  dextrose 5%. 1000 milliLiter(s) (50 mL/Hr) IV Continuous <Continuous>  dextrose 5%. 1000 milliLiter(s) (100 mL/Hr) IV Continuous <Continuous>  dextrose 50% Injectable 25 Gram(s) IV Push once  dextrose 50% Injectable 12.5 Gram(s) IV Push once  dextrose 50% Injectable 25 Gram(s) IV Push once  gabapentin 300 milliGRAM(s) Oral three times a day  insulin glargine Injectable (LANTUS) 28 Unit(s) SubCutaneous <User Schedule>  insulin lispro (ADMELOG) corrective regimen sliding scale   SubCutaneous three times a day before meals  insulin lispro (ADMELOG) corrective regimen sliding scale   SubCutaneous at bedtime  insulin lispro Injectable (ADMELOG) 6 Unit(s) SubCutaneous three times a day before meals  melatonin 3 milliGRAM(s) Oral at bedtime  metoprolol succinate ER 25 milliGRAM(s) Oral daily  montelukast 10 milliGRAM(s) Oral daily    MEDICATIONS  (PRN):  haloperidol    Injectable 1 milliGRAM(s) IntraMuscular every 8 hours PRN aggression      CAPILLARY BLOOD GLUCOSE      POCT Blood Glucose.: 110 mg/dL (30 Apr 2021 08:35)  POCT Blood Glucose.: 101 mg/dL (30 Apr 2021 04:59)  POCT Blood Glucose.: 114 mg/dL (29 Apr 2021 21:49)  POCT Blood Glucose.: 109 mg/dL (29 Apr 2021 21:44)  POCT Blood Glucose.: 91 mg/dL (29 Apr 2021 21:29)  POCT Blood Glucose.: 52 mg/dL (29 Apr 2021 21:14)  POCT Blood Glucose.: 63 mg/dL (29 Apr 2021 21:12)  POCT Blood Glucose.: 158 mg/dL (29 Apr 2021 17:16)  POCT Blood Glucose.: 271 mg/dL (29 Apr 2021 12:25)    I&O's Summary    29 Apr 2021 07:01  -  30 Apr 2021 07:00  --------------------------------------------------------  IN: 200 mL / OUT: 400 mL / NET: -200 mL        PHYSICAL EXAM:  Vital Signs Last 24 Hrs  T(C): 36.6 (30 Apr 2021 04:29), Max: 37 (29 Apr 2021 21:05)  T(F): 97.8 (30 Apr 2021 04:29), Max: 98.6 (29 Apr 2021 21:05)  HR: 85 (30 Apr 2021 04:29) (85 - 95)  BP: 148/76 (30 Apr 2021 04:29) (104/61 - 148/76)  BP(mean): --  RR: 18 (30 Apr 2021 04:29) (18 - 18)  SpO2: 95% (30 Apr 2021 04:29) (95% - 100%)    CONSTITUTIONAL: NAD  EYES: EOMI;  ENMT: Moist oral mucosa  RESPIRATORY: Normal respiratory effort; lungs are clear to auscultation bilaterally  CARDIOVASCULAR: Regular rate and rhythm, normal S1 and S2, no murmur; trace pedal edema   ABDOMEN: Nontender to palpation, normoactive bowel sounds, no rebound/guarding  MUSCULOSKELETAL:   no joint swelling or tenderness to palpation  PSYCH: alert and calm   NEUROLOGY: CN 2-12 are intact and symmetric; no gross sensory deficits   SKIN: No rashes; no palpable lesions    LABS:        RADIOLOGY & ADDITIONAL TESTS:  Results Reviewed:   Imaging Personally Reviewed:  Electrocardiogram Personally Reviewed:    COORDINATION OF CARE:  Care Discussed with Consultants/Other Providers [Y/N]: Y  Prior or Outpatient Records Reviewed [Y/N]: Y

## 2021-04-30 NOTE — CHART NOTE - NSCHARTNOTEFT_GEN_A_CORE
80 y/o female with a 81F hx of HTN, HLD, AFib on Eliquis, DM, HFrEF (EF 40-45% TTE 4/2018), CAD s/p cardiac cath 4/2018 a/w bilateral LE edema. During hospitalizations pt has had episodes of severe agitation (combative) requiring prn haldol. Pt also has had 2 episodes in the AM of 4/27 & 4/28/2021 where she becomes unresponsive/lethargic with stable vitals. Episodes of unresponsiveness and lethargy on 4/27 & 4/28/2021 was in the setting of receiving haldol 1mg the previous night. The AM of 4/29/2021 pt was alert (no unresponsive/lethargic episode), however did received haldol 0.5mg the previous night. Night of 4/29/2021, pt did not receive any haldol, and in AM of 4/30/2021, had an episode of being in a stuporous state lasting ~45 mins.    Vital Signs Last 24 Hrs  T(C): 36.6 (30 Apr 2021 04:29), Max: 37 (29 Apr 2021 21:05)  T(F): 97.8 (30 Apr 2021 04:29), Max: 98.6 (29 Apr 2021 21:05)  HR: 85 (30 Apr 2021 04:29) (85 - 95)  BP: 148/76 (30 Apr 2021 04:29) (104/61 - 148/76)  RR: 18 (30 Apr 2021 04:29) (18 - 18)  SpO2: 95% (30 Apr 2021 04:29) (95% - 100%)        Gen: Pt found lying in chair with eyes partially open.  HEENT: NC/AT. PERRL, bilat.  Neck: Supple.  Chest: Good air entry. CTA, bilat. No w/r/r.  Cardiac: RRR. Clear s1 and s2. No g/m/r.  Abd: Obese. Normoactive BS. NT x 4.  Ext: Bilat LE edema. No c/c/e of UEs.   Neuro: Stuporous. Answer to questions with mumbles. No tremors or tonic-clonic movements. Follows commands, however unable to verbalize. Does not become alert with verbal, tactile, or painful stimuli. Repeat neuro check ~45 mins later, pt alert, speaking in clear sentences, following commands, conversing appropriately, no focal deficits.     Imp: Stupor, resolved likely 2/2 delerium  Plan: Will sign out to day team re: event  Cont current management

## 2021-04-30 NOTE — PROGRESS NOTE ADULT - PROBLEM SELECTOR PLAN 6
At home on Tresiba 52U at bedtime w/ Novolog sliding scale; poor control with HbA1c of >9  - insulin regimen per endo, will discuss with endo regarding further decrease in insulin regimen given hypoglycemia the night before.   - RD consult  - pt given syringe with insulin and was able to self-inject however given memory issues doubt can do 4x a day insulin   - appreciate endocrine recs   - c/w gabapentin for neuropathy

## 2021-04-30 NOTE — PROGRESS NOTE ADULT - NSICDXPILOT_GEN_ALL_CORE
[place]

## 2021-04-30 NOTE — PROGRESS NOTE ADULT - PROBLEM SELECTOR PROBLEM 3
Hyperlipemia
Chronic atrial fibrillation
Hyperlipemia
Chronic systolic heart failure
Chronic systolic heart failure
Hyperlipemia
Hyperlipemia
Chronic systolic heart failure

## 2021-04-30 NOTE — PROVIDER CONTACT NOTE (OTHER) - BACKGROUND
(Admit Diagnosis) Heart failure  (PMH) AF (atrial fibrillation)  (PMH) Chronic systolic HF (heart failure)  (PMH) H/O osteomyelitis
patient was admitted due to CHF
patient was admitted due to CHF
patient was admitted due to CHF, she is a type 2 diabetic
(Admit Diagnosis) Heart failure  (PMH) AF (atrial fibrillation)  (PMH) Chronic systolic HF (heart failure)  (PMH) H/O osteomyelitis
(Admit Diagnosis) Heart failure  (PMH) AF (atrial fibrillation)  (PMH) Chronic systolic HF (heart failure)  (PMH) H/O osteomyelitis
Pt's baseline is confused to time, place and situation; restless and uncooperative at times; gets OOB without assist
patient was admitted due to CHF
patient was admitted due CHF
(Admit Diagnosis) Heart failure  (PMH) AF (atrial fibrillation)  (PMH) Chronic systolic HF (heart failure)  (PMH) H/O osteomyelitis
patient was admitted due to CHF, she is a type 2 diabetic
patient was admitted due to CHF
(Admit Diagnosis) Heart failure  (PMH) AF (atrial fibrillation)  (PMH) Chronic systolic HF (heart failure)  (PMH) H/O osteomyelitis
patient was admitted due to CHF exacerbation, HR in afib

## 2021-04-30 NOTE — PROVIDER CONTACT NOTE (OTHER) - ASSESSMENT
patient asymptomatic with no c/o distress at this time
patient is A&Ox3; confused, no complaints or s/s of SOB and chest pain
patient is normally A&Ox3; confused, VS as noted, no SOB or chest pain noted, patient is in no distress, pupils reactive
vitals stable   no acute distress noted   fall safety maintained   patient is asymptomatic
vitals stable   no acute distress noted   fall safety maintained   patient is asymptomatic
patient asymptomatic with no c/o distress. Repeat Left /63. Repeat right /84
patient is A&Ox2; confused, no SOb or chest pain, asymptomatic to the BP and HR
patient is A&Ox3; confused, on room air, asymptomatic, finger stick was 63, and then repeated to be 52. patient was given 2 4.0 oz of apple juice
patient is A&Ox3; confused, on room air, asymptomatic, finger stick went up to 109 and 114
vitals stable   no acute distress noted   fall safety maintained   patient is asymptomatic
patient is normally A&Ox3;confused, but during this episode she was A&Ox0, not responding to questions. VS stable, FS taken
Pt very agitated; think she is at home and staff are trespassing; patient screaming for the police; staff unable to reorient patient
vitals stable   no acute distress noted   fall safety maintained   patient is asymptomatic
vitals stable   no acute distress noted   fall safety maintained   patient is asymptomatic
patient is normally A&Ox3; confused, VS as noted, no SOB or chest pain noted, patient is in no distress, pupils reactive, patient right now is A&Ox0
patient is A&Ox2; confused and aggressive. asymptomatic to the heart rate and rhythm. no SOB or chest pain
patient is normally A&Ox3; confused, VS as noted, no SOB or chest pain noted, patient is in no distress, pupils reactive

## 2021-04-30 NOTE — PROGRESS NOTE ADULT - PROBLEM SELECTOR PLAN 7
Cardiac cath 4/2018 (pLAD 20% ISR, oD2 60%, dOM3 small with 99% ISR but supplied by collaterals from OM2, mRCA 30% with plan for aggressive medical management  - c/w asa, Cilostazol, statin, toprol 25mg
Cardiac cath 4/2018 (pLAD 20% ISR, oD2 60%, dOM3 small with 99% ISR but supplied by collaterals from OM2, mRCA 30% with plan for aggressive medical management  - c/w asa, Cilostazol, statin, toprol 25mg
Cardiac cath 4/2018 (pLAD 20% ISR, oD2 60%, dOM3 small with 99% ISR but supplied by collaterals from OM2, mRCA 30% with plan for aggressive medical management  - c/w asa, Cilostazol, statin
Cardiac cath 4/2018 (pLAD 20% ISR, oD2 60%, dOM3 small with 99% ISR but supplied by collaterals from OM2, mRCA 30% with plan for aggressive medical management  - c/w asa, Cilostazol, statin, toprol 25mg
Cardiac cath 4/2018 (pLAD 20% ISR, oD2 60%, dOM3 small with 99% ISR but supplied by collaterals from OM2, mRCA 30% with plan for aggressive medical management  - c/w asa, Cilostazol, statin
Cardiac cath 4/2018 (pLAD 20% ISR, oD2 60%, dOM3 small with 99% ISR but supplied by collaterals from OM2, mRCA 30% with plan for aggressive medical management  - c/w asa, Cilostazol, statin, toprol 25mg
- trend, renally dose meds, avoid nephrotoxins
Cardiac cath 4/2018 (pLAD 20% ISR, oD2 60%, dOM3 small with 99% ISR but supplied by collaterals from OM2, mRCA 30% with plan for aggressive medical management  - c/w asa, Cilostazol, statin, toprol 25mg

## 2021-04-30 NOTE — PROGRESS NOTE ADULT - PROBLEM SELECTOR PROBLEM 7
Stage 3b chronic kidney disease
Coronary artery disease, angina presence unspecified, unspecified vessel or lesion type, unspecified whether native or transplanted heart

## 2021-04-30 NOTE — PROGRESS NOTE ADULT - PROBLEM SELECTOR PLAN 9
- DVT ppx: Eliquis 5mg BID  - PT rec ISAAC,  SW/CM to d/w son home situation, has private hire aides   - no prior COVID vaccination per pt  - medically optimized for discharge to rehab but family requesting d/c home with aides.  Covid neg on 4/25  - Son Arturo Crane updated 4/28 at 2pm
- DVT ppx: Eliquis 5mg BID  - PT rec ISAAC,  SW/CM to d/w son home situation, has private hire aides   - no prior COVID vaccination per pt  - medically optimized for discharge to rehab but family requesting d/c home with aides.  Covid neg on 4/25  - Son Arturo Crane updated 4/28 at 2pm - d/c planning in am.
- DVT ppx: Eliquis 5mg BID  - PT rec ISAAC, pt amenable  - no prior COVID vaccination per pt
- DVT ppx: Eliquis 5mg BID  - PT rec ISAAC,  SW/CM to d/w son home situation, has private hire aides   - no prior COVID vaccination per pt  - medically optimized for discharge to rehab
- DVT ppx: Eliquis 5mg BID  - PT rec ISAAC,  SW/CM to d/w son home situation, has private hire aides   - no prior COVID vaccination per pt  - medically optimized for discharge to rehab but family requesting d/c home with aides.
- DVT ppx: Eliquis 5mg BID  - PT rec ISAAC, pending reassessment today, SW/CM to d/w son home situation, has private hire aides   - no prior COVID vaccination per pt  - medically optimized awaiting safe disposition plan
- DVT ppx: Eliquis 5mg BID  - PT rec ISAAC,  SW/CM to d/w son home situation, has private hire aides   - no prior COVID vaccination per pt  - medically optimized for discharge to rehab but family requesting d/c home with aides.  Covid neg on 4/25  - Son Arturo Crane updated - discharge planning and coordination ~ 45mins.
- DVT ppx: Eliquis 5mg BID  - PT rec ISAAC,  SW/CM to d/w son home situation, has private hire aides   - no prior COVID vaccination per pt  - medically optimized for discharge to rehab but family requesting d/c home with aides.  Covid neg on 4/25  - Son Arturo Crane called to update regarding this morning's events - will monitor today and plan for d/c tomorrow.
- DVT ppx: Eliquis 5mg BID  - PT rec ISAAC,  SW/CM to d/w son home situation, has private hire aides   - no prior COVID vaccination per pt  - medically optimized for discharge to rehab

## 2021-04-30 NOTE — PROGRESS NOTE ADULT - PROBLEM SELECTOR PROBLEM 8
Stage 3b chronic kidney disease
Prophylactic measure
Stage 3b chronic kidney disease

## 2021-04-30 NOTE — PROVIDER CONTACT NOTE (OTHER) - DATE AND TIME:
22-Apr-2021 06:50
30-Apr-2021 05:00
28-Apr-2021 05:30
28-Apr-2021 18:45
28-Apr-2021 05:50
28-Apr-2021 19:40
22-Apr-2021 09:24
27-Apr-2021 11:33
28-Apr-2021 20:30
22-Apr-2021 08:24
22-Apr-2021 09:11
27-Apr-2021 22:35
22-Apr-2021 08:24
28-Apr-2021 05:30
29-Apr-2021 21:15
22-Apr-2021 11:00
29-Apr-2021 21:50

## 2021-04-30 NOTE — PROGRESS NOTE ADULT - PROBLEM SELECTOR PROBLEM 4
Chronic atrial fibrillation
Hypertension, unspecified type
Chronic atrial fibrillation

## 2021-06-15 NOTE — PRE-OP CHECKLIST - INTERNAL PROSTHESES
Anesthesia Evaluation      Patient summary reviewed     Airway   Mallampati: I  Neck ROM: full   Pulmonary - negative ROS and normal exam    breath sounds clear to auscultation                         Cardiovascular   (+) CAD (No angina), dysrhythmias (A-fib), ,     Rhythm: irregular  Rate: normal,         Neuro/Psych    CVA: TIA.    Endo/Other - negative ROS      GI/Hepatic/Renal    (+)   chronic renal disease (kidney stone),           Dental - normal exam                        Anesthesia Plan  Planned anesthetic: MAC    ASA 3   Induction: intravenous   Anesthetic plan and risks discussed with: patient    Post-op plan: routine recovery           no

## 2021-09-03 NOTE — H&P ADULT - CLICK TO LAUNCH ORM
. Bi-Rhombic Flap Text: The defect edges were debeveled with a #15 scalpel blade.  Given the location of the defect and the proximity to free margins a bi-rhombic flap was deemed most appropriate.  Using a sterile surgical marker, an appropriate rhombic flap was drawn incorporating the defect. The area thus outlined was incised deep to adipose tissue with a #15 scalpel blade.  The skin margins were undermined to an appropriate distance in all directions utilizing iris scissors.

## 2023-01-06 NOTE — PROGRESS NOTE ADULT - PROBLEM/PLAN-2
DISPLAY PLAN FREE TEXT
moderate
DISPLAY PLAN FREE TEXT
DISPLAY PLAN FREE TEXT

## 2023-01-13 NOTE — ED PROVIDER NOTE - NS ED MD DISPO ISOLATION TYPES
None Xerosis Normal Treatment: I recommended application of Cetaphil or CeraVe numerous times a day and before going to bed to all dry areas.

## 2023-03-31 NOTE — PROGRESS NOTE ADULT - PROBLEM SELECTOR PROBLEM 3
- s/p failed kidney transplant   - Continue home Tacrolimus 2mg PO BID.    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

## 2023-08-02 NOTE — DISCHARGE NOTE PROVIDER - NSDCCPGOAL_GEN_ALL_CORE_FT
To get better and follow your care plan as instructed. Valtrex Counseling: I discussed with the patient the risks of valacyclovir including but not limited to kidney damage, nausea, vomiting and severe allergy.  The patient understands that if the infection seems to be worsening or is not improving, they are to call.

## 2024-02-28 NOTE — PROGRESS NOTE ADULT - I WAS PHYSICALLY PRESENT FOR THE KEY PORTIONS OF THE EVALUATION AND MANAGEMENT (E/M) SERVICE PROVIDED.  I AGREE WITH THE ABOVE HISTORY, PHYSICAL, AND PLAN WHICH I HAVE REVIEWED AND EDITED WHERE APPROPRIATE
Addended by: CAMMIE CORTEZ on: 2/28/2024 07:48 AM     Modules accepted: Orders     Statement Selected

## 2024-08-13 NOTE — ED PROVIDER NOTE - PHYSICAL EXAMINATION
[FreeTextEntry1] : 1 mL of 40 mg of Depo-Medrol and 2 mL of 1% plain lidocaine have been injected into the right lumbar musculature without adverse reaction (trigger point injection)
Neuro: Awake, alert and fully oriented x 4. No evidence of cognitive or language dysfunction.  Cranial nerves: Extraocular movements full. There is no facial asymmetry noted. CN XII intact.   Motor exam: good tone and bulk throughout. No pronator drift. Muscle strength is 5/5 throughout. Decreased sensation to right toes 1-4. Decreased sensation of plantar surface of right foot.

## 2024-08-20 NOTE — ED ADULT NURSE NOTE - RESPIRATORY RATE (BREATHS/MIN)
[20

## 2024-09-11 NOTE — CONSULT NOTE ADULT - SUBJECTIVE AND OBJECTIVE BOX
Lurdes Livingston RN CCDS HPI:  78 years old female lives alone at home, walks independently, with PMH of IDDM, HTN, MI, and CAD (s/p stents) and PSH of Coronary Artery Stent Placement and Hysterectomy was referred by podiatrist/ PMD cellulitis and suspected vascular complication of the right foot today. Patient is a poor historian so history was also obtained from ED provider. Pt reports having foot issue for many years, has been regularly followed local podiatrist. However since last month she has had numbness/decreased sensation of the right foot along with mild foot pain, and it has been worsening over last week. She says she also feels intermittent chills, but denies fever, SOB, chest pain, abdominal pain, weakness, N/V, or any other symptoms. Pt he was also seen at [facility] and [facility] multiple times.    In ED, pt was hemodynamically stable, with grossly normal labs except H/H of 10.4/34.1. X-ray of the Rt foot: No evidence for acute fracture or dislocation. Soft tissue swelling. No distinct regions of osteolysis or osteosclerosis identified. If osteomyelitis remains of clinical concern MRI evaluation can be performed.  Arterial and venous doppler were also performed; Normal bilateral RAMIRO. Moderately diminished pulse waveforms at the level of the right digits.    Pt is admitted to the medicine floor for right great toe dry gangrene, concern for cellulitis.    ** Primary team please call [facility] pharmacy and complete med rec. Pt does not remember the names of medications. (13 Apr 2018 22:05)      PAST MEDICAL & SURGICAL HISTORY:  MI (myocardial infarction)  CAD (coronary artery disease)  Hypertension  Diabetes  H/O: hysterectomy  S/P coronary artery stent placement      Vital Signs Last 24 Hrs  T(C): 36.8 (13 Apr 2018 22:08), Max: 37.1 (13 Apr 2018 16:10)  T(F): 98.2 (13 Apr 2018 22:08), Max: 98.8 (13 Apr 2018 16:10)  HR: 75 (13 Apr 2018 22:08) (71 - 82)  BP: 148/67 (13 Apr 2018 22:08) (93/55 - 148/67)  BP(mean): --  RR: 18 (13 Apr 2018 22:08) (16 - 18)  SpO2: 97% (13 Apr 2018 22:08) (97% - 100%)                          10.4   6.0   )-----------( 251      ( 13 Apr 2018 17:49 )             34.1     04-13    138  |  107  |  28<H>  ----------------------------<  88  4.8   |  25  |  1.27    Ca    8.9      13 Apr 2018 17:49    TPro  7.5  /  Alb  2.8<L>  /  TBili  0.4  /  DBili  x   /  AST  38  /  ALT  41  /  AlkPhos  91  04-13        PHYSICAL EXAM  R foot with some areas of black discoloration to first and second digits with some dorsal swelling; no pain to foot at time of exam, warm, moves toes, sensation intact per patient; no drainage or areas of wet gangrene      ASSESSMENT/ PLAN:  pt with b/l normal RAMIRO  non tender R foot, warm with chronic R foot issues followed by podiatry  case discussed with Dr Holland  f/u podiatry recs  no leukocytosis  Dr Holland to f/u HPI:  78 years old female lives alone at home, walks independently, with PMH of IDDM, HTN, MI, and CAD (s/p stents) and PSH of Coronary Artery Stent Placement and Hysterectomy was referred by podiatrist/ PMD cellulitis and suspected vascular complication of the right foot today. Patient is a poor historian so history was also obtained from ED provider. Pt reports having foot issue for many years, has been regularly followed local podiatrist. However since last month she has had numbness/decreased sensation of the right foot along with mild foot pain, and it has been worsening over last week. She says she also feels intermittent chills, but denies fever, SOB, chest pain, abdominal pain, weakness, N/V, or any other symptoms. Pt he was also seen at [facility] and [facility] multiple times.  pt denies intermittent claudication or noct cramps    In ED, pt was hemodynamically stable, with grossly normal labs except H/H of 10.4/34.1. X-ray of the Rt foot: No evidence for acute fracture or dislocation. Soft tissue swelling. No distinct regions of osteolysis or osteosclerosis identified. If osteomyelitis remains of clinical concern MRI evaluation can be performed.  Arterial and venous doppler were also performed; Normal bilateral RAMIRO. Moderately diminished pulse waveforms at the level of the right digits.    Pt is admitted to the medicine floor for right great toe dry gangrene, concern for cellulitis.    ** Primary team please call [facility] pharmacy and complete med rec. Pt does not remember the names of medications. (13 Apr 2018 22:05)      PAST MEDICAL & SURGICAL HISTORY:  MI (myocardial infarction)  CAD (coronary artery disease)  Hypertension  Diabetes  H/O: hysterectomy  S/P coronary artery stent placement      Vital Signs Last 24 Hrs  T(C): 36.8 (13 Apr 2018 22:08), Max: 37.1 (13 Apr 2018 16:10)  T(F): 98.2 (13 Apr 2018 22:08), Max: 98.8 (13 Apr 2018 16:10)  HR: 75 (13 Apr 2018 22:08) (71 - 82)  BP: 148/67 (13 Apr 2018 22:08) (93/55 - 148/67)  BP(mean): --  RR: 18 (13 Apr 2018 22:08) (16 - 18)  SpO2: 97% (13 Apr 2018 22:08) (97% - 100%)                          10.4   6.0   )-----------( 251      ( 13 Apr 2018 17:49 )             34.1     04-13    138  |  107  |  28<H>  ----------------------------<  88  4.8   |  25  |  1.27    Ca    8.9      13 Apr 2018 17:49    TPro  7.5  /  Alb  2.8<L>  /  TBili  0.4  /  DBili  x   /  AST  38  /  ALT  41  /  AlkPhos  91  04-13        PHYSICAL EXAM  R foot with some areas of black discoloration to first and second digits with some dorsal swelling; no pain to foot at time of exam, warm, moves toes, sensation intact per patient; no drainage or areas of wet gangrene      ASSESSMENT/ PLAN:  pt with b/l normal RAMIRO  non tender R foot, warm with chronic R foot issues followed by podiatry  case discussed with Dr Holland  f/u podiatry recs  no leukocytosis  Dr Holland to f/u

## 2025-07-16 NOTE — ED ADULT TRIAGE NOTE - PAIN: PRESENCE, MLM
Detail Level: Detailed Detail Level: Generalized When Should The Patient Follow-Up For Their Next Full-Body Skin Exam?: 6 Months Quality 137: Melanoma: Continuity Of Care - Recall System: Patient information entered into a recall system that includes: target date for the next exam specified AND a process to follow up with patients regarding missed or unscheduled appointments Detail Level: Simple denies pain/discomfort